# Patient Record
Sex: MALE | Race: WHITE | Employment: UNEMPLOYED | URBAN - METROPOLITAN AREA
[De-identification: names, ages, dates, MRNs, and addresses within clinical notes are randomized per-mention and may not be internally consistent; named-entity substitution may affect disease eponyms.]

---

## 2019-07-15 ENCOUNTER — APPOINTMENT (OUTPATIENT)
Dept: CT IMAGING | Age: 59
DRG: 957 | End: 2019-07-15
Payer: COMMERCIAL

## 2019-07-15 ENCOUNTER — APPOINTMENT (OUTPATIENT)
Dept: GENERAL RADIOLOGY | Age: 59
DRG: 957 | End: 2019-07-15
Payer: COMMERCIAL

## 2019-07-15 ENCOUNTER — HOSPITAL ENCOUNTER (INPATIENT)
Age: 59
LOS: 31 days | Discharge: NURSING, REHAB OR LONG-TERM CARE | DRG: 957 | End: 2019-08-15
Attending: EMERGENCY MEDICINE | Admitting: SURGERY
Payer: COMMERCIAL

## 2019-07-15 DIAGNOSIS — S22.41XA CLOSED FRACTURE OF MULTIPLE RIBS OF RIGHT SIDE, INITIAL ENCOUNTER: ICD-10-CM
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
## 2019-07-15 PROBLEM — E87.6 HYPOKALEMIA: Status: ACTIVE | Noted: 2019-07-15

## 2019-07-15 PROBLEM — S32.301A CLOSED FRACTURE OF RIGHT ILIAC WING (HCC): Status: ACTIVE | Noted: 2019-07-15

## 2019-07-15 PROBLEM — S27.321A CONTUSION OF RIGHT LUNG: Status: ACTIVE | Noted: 2019-07-15

## 2019-07-15 PROBLEM — J94.2 HEMOTHORAX ON RIGHT: Status: ACTIVE | Noted: 2019-07-15

## 2019-07-15 PROBLEM — J93.9 PNEUMOTHORAX: Status: ACTIVE | Noted: 2019-07-15

## 2019-07-15 PROBLEM — S22.009A FRACTURE OF TRANSVERSE PROCESS OF THORACIC VERTEBRA (HCC): Status: ACTIVE | Noted: 2019-07-15

## 2019-07-15 LAB
ABO/RH: NORMAL
ABSOLUTE EOS #: 0.24 K/UL (ref 0–0.44)
ABSOLUTE IMMATURE GRANULOCYTE: 0.12 K/UL (ref 0–0.3)
ABSOLUTE LYMPH #: 0.61 K/UL (ref 1.1–3.7)
ABSOLUTE MONO #: 1.09 K/UL (ref 0.1–1.2)
ALLEN TEST: ABNORMAL
ANION GAP SERPL CALCULATED.3IONS-SCNC: 19 MMOL/L (ref 9–17)
ANION GAP SERPL CALCULATED.3IONS-SCNC: 22 MMOL/L (ref 9–17)
ANTIBODY SCREEN: NEGATIVE
ARM BAND NUMBER: NORMAL
BASOPHILS # BLD: 0 % (ref 0–2)
BASOPHILS ABSOLUTE: 0 K/UL (ref 0–0.2)
BLOOD BANK SPECIMEN: ABNORMAL
BUN BLDV-MCNC: 10 MG/DL (ref 6–20)
BUN BLDV-MCNC: 12 MG/DL (ref 6–20)
BUN/CREAT BLD: ABNORMAL (ref 9–20)
CALCIUM SERPL-MCNC: 8.2 MG/DL (ref 8.6–10.4)
CARBOXYHEMOGLOBIN: 0.9 % (ref 0–5)
CHLORIDE BLD-SCNC: 87 MMOL/L (ref 98–107)
CHLORIDE BLD-SCNC: 91 MMOL/L (ref 98–107)
CO2: 19 MMOL/L (ref 20–31)
CO2: 24 MMOL/L (ref 20–31)
CREAT SERPL-MCNC: 0.82 MG/DL (ref 0.7–1.2)
CREAT SERPL-MCNC: 1.06 MG/DL (ref 0.7–1.2)
DIFFERENTIAL TYPE: ABNORMAL
EOSINOPHILS RELATIVE PERCENT: 2 % (ref 1–4)
ETHANOL PERCENT: 0.29 %
ETHANOL: 295 MG/DL
EXPIRATION DATE: NORMAL
FIO2: ABNORMAL
GFR AFRICAN AMERICAN: >60 ML/MIN
GFR AFRICAN AMERICAN: >60 ML/MIN
GFR NON-AFRICAN AMERICAN: >60 ML/MIN
GFR NON-AFRICAN AMERICAN: >60 ML/MIN
GFR SERPL CREATININE-BSD FRML MDRD: ABNORMAL ML/MIN/{1.73_M2}
GLUCOSE BLD-MCNC: 119 MG/DL (ref 70–99)
GLUCOSE BLD-MCNC: 120 MG/DL (ref 70–99)
HCG QUALITATIVE: ABNORMAL
HCO3 VENOUS: 25.9 MMOL/L (ref 24–30)
HCT VFR BLD CALC: 34.6 % (ref 40.7–50.3)
HCT VFR BLD CALC: 39.5 % (ref 40.7–50.3)
HEMOGLOBIN: 11.4 G/DL (ref 13–17)
HEMOGLOBIN: 13.6 G/DL (ref 13–17)
IMMATURE GRANULOCYTES: 1 %
INR BLD: 1
LYMPHOCYTES # BLD: 5 % (ref 24–43)
MAGNESIUM: 1.5 MG/DL (ref 1.6–2.6)
MCH RBC QN AUTO: 31 PG (ref 25.2–33.5)
MCH RBC QN AUTO: 32.2 PG (ref 25.2–33.5)
MCHC RBC AUTO-ENTMCNC: 32.9 G/DL (ref 28.4–34.8)
MCHC RBC AUTO-ENTMCNC: 34.4 G/DL (ref 28.4–34.8)
MCV RBC AUTO: 93.6 FL (ref 82.6–102.9)
MCV RBC AUTO: 94 FL (ref 82.6–102.9)
METHEMOGLOBIN: ABNORMAL % (ref 0–1.5)
MODE: ABNORMAL
MONOCYTES # BLD: 9 % (ref 3–12)
MORPHOLOGY: NORMAL
NEGATIVE BASE EXCESS, VEN: 0.2 MMOL/L (ref 0–2)
NOTIFICATION TIME: ABNORMAL
NOTIFICATION: ABNORMAL
NRBC AUTOMATED: 0 PER 100 WBC
NRBC AUTOMATED: 0 PER 100 WBC
O2 DEVICE/FLOW/%: ABNORMAL
O2 SAT, VEN: 35.1 % (ref 60–85)
OXYHEMOGLOBIN: ABNORMAL % (ref 95–98)
PARTIAL THROMBOPLASTIN TIME: 23.2 SEC (ref 20.5–30.5)
PATIENT TEMP: 37
PCO2, VEN, TEMP ADJ: ABNORMAL MMHG (ref 39–55)
PCO2, VEN: 50.5 (ref 39–55)
PDW BLD-RTO: 11.9 % (ref 11.8–14.4)
PDW BLD-RTO: 11.9 % (ref 11.8–14.4)
PEEP/CPAP: ABNORMAL
PH VENOUS: 7.33 (ref 7.32–7.42)
PH, VEN, TEMP ADJ: ABNORMAL (ref 7.32–7.42)
PLATELET # BLD: 184 K/UL (ref 138–453)
PLATELET # BLD: 235 K/UL (ref 138–453)
PLATELET ESTIMATE: ABNORMAL
PMV BLD AUTO: 8.8 FL (ref 8.1–13.5)
PMV BLD AUTO: 8.9 FL (ref 8.1–13.5)
PO2, VEN, TEMP ADJ: ABNORMAL MMHG (ref 30–50)
PO2, VEN: 27.3 (ref 30–50)
POSITIVE BASE EXCESS, VEN: ABNORMAL MMOL/L (ref 0–2)
POTASSIUM SERPL-SCNC: 2.6 MMOL/L (ref 3.7–5.3)
POTASSIUM SERPL-SCNC: 2.9 MMOL/L (ref 3.7–5.3)
POTASSIUM SERPL-SCNC: 3 MMOL/L (ref 3.7–5.3)
PROTHROMBIN TIME: 10.3 SEC (ref 9–12)
PSV: ABNORMAL
PT. POSITION: ABNORMAL
RBC # BLD: 3.68 M/UL (ref 4.21–5.77)
RBC # BLD: 4.22 M/UL (ref 4.21–5.77)
RBC # BLD: ABNORMAL 10*6/UL
RESPIRATORY RATE: ABNORMAL
SAMPLE SITE: ABNORMAL
SEG NEUTROPHILS: 83 % (ref 36–65)
SEGMENTED NEUTROPHILS ABSOLUTE COUNT: 10.04 K/UL (ref 1.5–8.1)
SET RATE: ABNORMAL
SODIUM BLD-SCNC: 130 MMOL/L (ref 135–144)
SODIUM BLD-SCNC: 132 MMOL/L (ref 135–144)
TEXT FOR RESPIRATORY: ABNORMAL
TOTAL HB: ABNORMAL G/DL (ref 12–16)
TOTAL RATE: ABNORMAL
VITAMIN D 25-HYDROXY: 40.9 NG/ML (ref 30–100)
VT: ABNORMAL
WBC # BLD: 12.1 K/UL (ref 3.5–11.3)
WBC # BLD: 14.5 K/UL (ref 3.5–11.3)
WBC # BLD: ABNORMAL 10*3/UL

## 2019-07-15 PROCEDURE — 92523 SPEECH SOUND LANG COMPREHEN: CPT

## 2019-07-15 PROCEDURE — 94760 N-INVAS EAR/PLS OXIMETRY 1: CPT

## 2019-07-15 PROCEDURE — 6370000000 HC RX 637 (ALT 250 FOR IP): Performed by: STUDENT IN AN ORGANIZED HEALTH CARE EDUCATION/TRAINING PROGRAM

## 2019-07-15 PROCEDURE — 94669 MECHANICAL CHEST WALL OSCILL: CPT

## 2019-07-15 PROCEDURE — 82565 ASSAY OF CREATININE: CPT

## 2019-07-15 PROCEDURE — 71045 X-RAY EXAM CHEST 1 VIEW: CPT

## 2019-07-15 PROCEDURE — 2500000003 HC RX 250 WO HCPCS: Performed by: STUDENT IN AN ORGANIZED HEALTH CARE EDUCATION/TRAINING PROGRAM

## 2019-07-15 PROCEDURE — 85610 PROTHROMBIN TIME: CPT

## 2019-07-15 PROCEDURE — 6360000002 HC RX W HCPCS: Performed by: STUDENT IN AN ORGANIZED HEALTH CARE EDUCATION/TRAINING PROGRAM

## 2019-07-15 PROCEDURE — 2580000003 HC RX 258: Performed by: STUDENT IN AN ORGANIZED HEALTH CARE EDUCATION/TRAINING PROGRAM

## 2019-07-15 PROCEDURE — 82805 BLOOD GASES W/O2 SATURATION: CPT

## 2019-07-15 PROCEDURE — 86850 RBC ANTIBODY SCREEN: CPT

## 2019-07-15 PROCEDURE — 70450 CT HEAD/BRAIN W/O DYE: CPT

## 2019-07-15 PROCEDURE — 74177 CT ABD & PELVIS W/CONTRAST: CPT

## 2019-07-15 PROCEDURE — 6360000004 HC RX CONTRAST MEDICATION: Performed by: SURGERY

## 2019-07-15 PROCEDURE — 86901 BLOOD TYPING SEROLOGIC RH(D): CPT

## 2019-07-15 PROCEDURE — 84132 ASSAY OF SERUM POTASSIUM: CPT

## 2019-07-15 PROCEDURE — 6360000002 HC RX W HCPCS

## 2019-07-15 PROCEDURE — 2060000000 HC ICU INTERMEDIATE R&B

## 2019-07-15 PROCEDURE — 85027 COMPLETE CBC AUTOMATED: CPT

## 2019-07-15 PROCEDURE — 2700000000 HC OXYGEN THERAPY PER DAY

## 2019-07-15 PROCEDURE — G0480 DRUG TEST DEF 1-7 CLASSES: HCPCS

## 2019-07-15 PROCEDURE — 84520 ASSAY OF UREA NITROGEN: CPT

## 2019-07-15 PROCEDURE — 80048 BASIC METABOLIC PNL TOTAL CA: CPT

## 2019-07-15 PROCEDURE — 72170 X-RAY EXAM OF PELVIS: CPT

## 2019-07-15 PROCEDURE — 99285 EMERGENCY DEPT VISIT HI MDM: CPT

## 2019-07-15 PROCEDURE — 82947 ASSAY GLUCOSE BLOOD QUANT: CPT

## 2019-07-15 PROCEDURE — 72128 CT CHEST SPINE W/O DYE: CPT

## 2019-07-15 PROCEDURE — 72125 CT NECK SPINE W/O DYE: CPT

## 2019-07-15 PROCEDURE — 36415 COLL VENOUS BLD VENIPUNCTURE: CPT

## 2019-07-15 PROCEDURE — 85025 COMPLETE CBC W/AUTO DIFF WBC: CPT

## 2019-07-15 PROCEDURE — 86900 BLOOD TYPING SEROLOGIC ABO: CPT

## 2019-07-15 PROCEDURE — 84703 CHORIONIC GONADOTROPIN ASSAY: CPT

## 2019-07-15 PROCEDURE — 72131 CT LUMBAR SPINE W/O DYE: CPT

## 2019-07-15 PROCEDURE — 83735 ASSAY OF MAGNESIUM: CPT

## 2019-07-15 PROCEDURE — 82306 VITAMIN D 25 HYDROXY: CPT

## 2019-07-15 PROCEDURE — 80051 ELECTROLYTE PANEL: CPT

## 2019-07-15 PROCEDURE — 85730 THROMBOPLASTIN TIME PARTIAL: CPT

## 2019-07-15 RX ORDER — ACETAMINOPHEN 325 MG/1
650 TABLET ORAL EVERY 4 HOURS PRN
Status: CANCELLED | OUTPATIENT
Start: 2019-07-15

## 2019-07-15 RX ORDER — PROMETHAZINE HYDROCHLORIDE 25 MG/ML
6.25 INJECTION, SOLUTION INTRAMUSCULAR; INTRAVENOUS ONCE
Status: COMPLETED | OUTPATIENT
Start: 2019-07-15 | End: 2019-07-15

## 2019-07-15 RX ORDER — LIDOCAINE 4 G/G
2 PATCH TOPICAL DAILY
Status: DISCONTINUED | OUTPATIENT
Start: 2019-07-15 | End: 2019-08-15 | Stop reason: HOSPADM

## 2019-07-15 RX ORDER — GABAPENTIN 300 MG/1
300 CAPSULE ORAL EVERY 8 HOURS
Status: DISCONTINUED | OUTPATIENT
Start: 2019-07-15 | End: 2019-07-22

## 2019-07-15 RX ORDER — MAGNESIUM SULFATE 1 G/100ML
1 INJECTION INTRAVENOUS ONCE
Status: COMPLETED | OUTPATIENT
Start: 2019-07-15 | End: 2019-07-15

## 2019-07-15 RX ORDER — POTASSIUM CHLORIDE 7.45 MG/ML
20 INJECTION INTRAVENOUS ONCE
Status: COMPLETED | OUTPATIENT
Start: 2019-07-15 | End: 2019-07-15

## 2019-07-15 RX ORDER — FENTANYL CITRATE 50 UG/ML
25 INJECTION, SOLUTION INTRAMUSCULAR; INTRAVENOUS ONCE
Status: COMPLETED | OUTPATIENT
Start: 2019-07-15 | End: 2019-07-15

## 2019-07-15 RX ORDER — POLYETHYLENE GLYCOL 3350 17 G/17G
17 POWDER, FOR SOLUTION ORAL DAILY
Status: DISCONTINUED | OUTPATIENT
Start: 2019-07-15 | End: 2019-08-03

## 2019-07-15 RX ORDER — OXYCODONE HYDROCHLORIDE 5 MG/1
5 TABLET ORAL EVERY 4 HOURS PRN
Status: DISCONTINUED | OUTPATIENT
Start: 2019-07-15 | End: 2019-07-21

## 2019-07-15 RX ORDER — ACETAMINOPHEN 500 MG
1000 TABLET ORAL EVERY 8 HOURS
Status: DISCONTINUED | OUTPATIENT
Start: 2019-07-15 | End: 2019-08-15 | Stop reason: HOSPADM

## 2019-07-15 RX ORDER — POTASSIUM CHLORIDE 7.45 MG/ML
10 INJECTION INTRAVENOUS
Status: COMPLETED | OUTPATIENT
Start: 2019-07-15 | End: 2019-07-15

## 2019-07-15 RX ORDER — CYCLOBENZAPRINE HCL 10 MG
5 TABLET ORAL EVERY 8 HOURS
Status: DISCONTINUED | OUTPATIENT
Start: 2019-07-15 | End: 2019-07-15

## 2019-07-15 RX ORDER — SODIUM CHLORIDE 0.9 % (FLUSH) 0.9 %
10 SYRINGE (ML) INJECTION PRN
Status: DISCONTINUED | OUTPATIENT
Start: 2019-07-15 | End: 2019-08-15 | Stop reason: HOSPADM

## 2019-07-15 RX ORDER — SODIUM CHLORIDE 0.9 % (FLUSH) 0.9 %
10 SYRINGE (ML) INJECTION EVERY 12 HOURS SCHEDULED
Status: DISCONTINUED | OUTPATIENT
Start: 2019-07-15 | End: 2019-08-15 | Stop reason: HOSPADM

## 2019-07-15 RX ORDER — IBUPROFEN 400 MG/1
400 TABLET ORAL ONCE
Status: DISCONTINUED | OUTPATIENT
Start: 2019-07-15 | End: 2019-07-15

## 2019-07-15 RX ORDER — FAMOTIDINE 20 MG/1
20 TABLET, FILM COATED ORAL 2 TIMES DAILY
Status: DISCONTINUED | OUTPATIENT
Start: 2019-07-15 | End: 2019-07-26

## 2019-07-15 RX ORDER — POTASSIUM CHLORIDE 20 MEQ/1
40 TABLET, EXTENDED RELEASE ORAL ONCE
Status: COMPLETED | OUTPATIENT
Start: 2019-07-15 | End: 2019-07-15

## 2019-07-15 RX ORDER — ONDANSETRON 2 MG/ML
4 INJECTION INTRAMUSCULAR; INTRAVENOUS EVERY 6 HOURS PRN
Status: DISCONTINUED | OUTPATIENT
Start: 2019-07-15 | End: 2019-07-22

## 2019-07-15 RX ORDER — POTASSIUM CHLORIDE 20 MEQ/1
10 TABLET, EXTENDED RELEASE ORAL 2 TIMES DAILY
Status: DISCONTINUED | OUTPATIENT
Start: 2019-07-15 | End: 2019-07-20

## 2019-07-15 RX ORDER — ACETAMINOPHEN 500 MG
1000 TABLET ORAL ONCE
Status: DISCONTINUED | OUTPATIENT
Start: 2019-07-15 | End: 2019-07-15

## 2019-07-15 RX ORDER — DOCUSATE SODIUM 100 MG/1
100 CAPSULE, LIQUID FILLED ORAL DAILY
Status: DISCONTINUED | OUTPATIENT
Start: 2019-07-15 | End: 2019-07-25

## 2019-07-15 RX ORDER — CYCLOBENZAPRINE HCL 10 MG
5 TABLET ORAL EVERY 8 HOURS
Status: DISCONTINUED | OUTPATIENT
Start: 2019-07-15 | End: 2019-07-30

## 2019-07-15 RX ORDER — GABAPENTIN 300 MG/1
300 CAPSULE ORAL EVERY 8 HOURS
Status: DISCONTINUED | OUTPATIENT
Start: 2019-07-15 | End: 2019-07-15

## 2019-07-15 RX ORDER — IBUPROFEN 400 MG/1
400 TABLET ORAL EVERY 4 HOURS
Status: DISCONTINUED | OUTPATIENT
Start: 2019-07-15 | End: 2019-07-21

## 2019-07-15 RX ORDER — OXYCODONE HYDROCHLORIDE 5 MG/1
10 TABLET ORAL EVERY 4 HOURS PRN
Status: DISCONTINUED | OUTPATIENT
Start: 2019-07-15 | End: 2019-07-21

## 2019-07-15 RX ORDER — SODIUM CHLORIDE, SODIUM LACTATE, POTASSIUM CHLORIDE, CALCIUM CHLORIDE 600; 310; 30; 20 MG/100ML; MG/100ML; MG/100ML; MG/100ML
INJECTION, SOLUTION INTRAVENOUS CONTINUOUS
Status: DISCONTINUED | OUTPATIENT
Start: 2019-07-15 | End: 2019-07-15

## 2019-07-15 RX ADMIN — POTASSIUM CHLORIDE 40 MEQ: 20 TABLET, EXTENDED RELEASE ORAL at 19:38

## 2019-07-15 RX ADMIN — Medication 10 ML: at 19:38

## 2019-07-15 RX ADMIN — ACETAMINOPHEN 1000 MG: 500 TABLET ORAL at 11:48

## 2019-07-15 RX ADMIN — POTASSIUM CHLORIDE 40 MEQ: 20 TABLET, EXTENDED RELEASE ORAL at 12:52

## 2019-07-15 RX ADMIN — IBUPROFEN 400 MG: 400 TABLET, FILM COATED ORAL at 19:38

## 2019-07-15 RX ADMIN — GABAPENTIN 300 MG: 300 CAPSULE ORAL at 16:49

## 2019-07-15 RX ADMIN — IBUPROFEN 400 MG: 400 TABLET, FILM COATED ORAL at 22:56

## 2019-07-15 RX ADMIN — POTASSIUM CHLORIDE 10 MEQ: 7.46 INJECTION, SOLUTION INTRAVENOUS at 21:59

## 2019-07-15 RX ADMIN — ONDANSETRON 4 MG: 2 INJECTION INTRAMUSCULAR; INTRAVENOUS at 03:52

## 2019-07-15 RX ADMIN — IBUPROFEN 400 MG: 400 TABLET, FILM COATED ORAL at 09:35

## 2019-07-15 RX ADMIN — PROMETHAZINE HYDROCHLORIDE 6.25 MG: 25 INJECTION INTRAMUSCULAR; INTRAVENOUS at 05:49

## 2019-07-15 RX ADMIN — DOCUSATE SODIUM 100 MG: 100 CAPSULE, LIQUID FILLED ORAL at 09:35

## 2019-07-15 RX ADMIN — SODIUM CHLORIDE, POTASSIUM CHLORIDE, SODIUM LACTATE AND CALCIUM CHLORIDE: 600; 310; 30; 20 INJECTION, SOLUTION INTRAVENOUS at 03:58

## 2019-07-15 RX ADMIN — FOLIC ACID: 5 INJECTION, SOLUTION INTRAMUSCULAR; INTRAVENOUS; SUBCUTANEOUS at 20:31

## 2019-07-15 RX ADMIN — Medication 10 ML: at 09:40

## 2019-07-15 RX ADMIN — MAGNESIUM SULFATE IN DEXTROSE 1 G: 10 INJECTION, SOLUTION INTRAVENOUS at 22:52

## 2019-07-15 RX ADMIN — ACETAMINOPHEN 1000 MG: 500 TABLET ORAL at 19:35

## 2019-07-15 RX ADMIN — IOHEXOL 130 ML: 350 INJECTION, SOLUTION INTRAVENOUS at 02:03

## 2019-07-15 RX ADMIN — IBUPROFEN 400 MG: 400 TABLET, FILM COATED ORAL at 11:48

## 2019-07-15 RX ADMIN — IBUPROFEN 400 MG: 400 TABLET, FILM COATED ORAL at 15:08

## 2019-07-15 RX ADMIN — POTASSIUM CHLORIDE 10 MEQ: 7.46 INJECTION, SOLUTION INTRAVENOUS at 19:39

## 2019-07-15 RX ADMIN — CYCLOBENZAPRINE 5 MG: 10 TABLET, FILM COATED ORAL at 16:49

## 2019-07-15 RX ADMIN — POTASSIUM CHLORIDE 10 MEQ: 1500 TABLET, EXTENDED RELEASE ORAL at 09:35

## 2019-07-15 RX ADMIN — POTASSIUM CHLORIDE 10 MEQ: 1500 TABLET, EXTENDED RELEASE ORAL at 06:06

## 2019-07-15 RX ADMIN — CYCLOBENZAPRINE 5 MG: 10 TABLET, FILM COATED ORAL at 09:35

## 2019-07-15 RX ADMIN — IBUPROFEN 400 MG: 400 TABLET, FILM COATED ORAL at 05:37

## 2019-07-15 RX ADMIN — OXYCODONE HYDROCHLORIDE 10 MG: 5 TABLET ORAL at 20:50

## 2019-07-15 RX ADMIN — POTASSIUM CHLORIDE 10 MEQ: 1500 TABLET, EXTENDED RELEASE ORAL at 19:43

## 2019-07-15 RX ADMIN — OXYCODONE HYDROCHLORIDE 10 MG: 5 TABLET ORAL at 11:47

## 2019-07-15 RX ADMIN — POTASSIUM CHLORIDE 20 MEQ: 7.46 INJECTION, SOLUTION INTRAVENOUS at 04:21

## 2019-07-15 RX ADMIN — OXYCODONE HYDROCHLORIDE 10 MG: 5 TABLET ORAL at 06:08

## 2019-07-15 RX ADMIN — POTASSIUM CHLORIDE 10 MEQ: 7.46 INJECTION, SOLUTION INTRAVENOUS at 20:50

## 2019-07-15 RX ADMIN — ACETAMINOPHEN 1000 MG: 500 TABLET ORAL at 05:37

## 2019-07-15 RX ADMIN — FENTANYL CITRATE 25 MCG: 50 INJECTION, SOLUTION INTRAMUSCULAR; INTRAVENOUS at 04:21

## 2019-07-15 RX ADMIN — POTASSIUM CHLORIDE 10 MEQ: 7.46 INJECTION, SOLUTION INTRAVENOUS at 22:53

## 2019-07-15 RX ADMIN — OXYCODONE HYDROCHLORIDE 10 MG: 5 TABLET ORAL at 16:38

## 2019-07-15 RX ADMIN — GABAPENTIN 300 MG: 300 CAPSULE ORAL at 09:35

## 2019-07-15 RX ADMIN — POLYETHYLENE GLYCOL 3350 17 G: 17 POWDER, FOR SOLUTION ORAL at 09:40

## 2019-07-15 ASSESSMENT — PAIN DESCRIPTION - PAIN TYPE: TYPE: ACUTE PAIN

## 2019-07-15 ASSESSMENT — PAIN SCALES - GENERAL
PAINLEVEL_OUTOF10: 9
PAINLEVEL_OUTOF10: 10
PAINLEVEL_OUTOF10: 9
PAINLEVEL_OUTOF10: 10
PAINLEVEL_OUTOF10: 10
PAINLEVEL_OUTOF10: 9

## 2019-07-15 ASSESSMENT — PAIN DESCRIPTION - LOCATION: LOCATION: RIB CAGE;BACK

## 2019-07-15 ASSESSMENT — PAIN DESCRIPTION - ORIENTATION: ORIENTATION: RIGHT

## 2019-07-15 NOTE — CARE COORDINATION
SBIRT completed - see below    Pt stated he drinks alcohol daily, 6 beers  Admits to marijuana use 2-3x week  He denied other drug use  Pt stated he is not concerned about his alcohol/drug use  No prior tx - stated he has only done a  72 hrs DUI program  Prior involvement with AA that was court ordered  Pt does not plan to quit drinking  Offered to get him a list of tx resources in his area - pt declined            Alcohol Screening and Brief Intervention        Recent Labs     07/15/19  0154   *       Alcohol Pre-screening  (MEN ONLY) How many times in the past year have you had 5 or more drinks in a day?: 1 or more       Alcohol Screening Audit  TOTAL SCORE[de-identified] 14    Drug Pre-Screening   How many times in the past year have you used a recreational drug or used a prescription medication for nonmedical reasons?: 1 or more    Drug Screening DAST  TOTAL SCORE[de-identified] 1    Mood Pre-Screening (PHQ-2)  During the past two weeks, have you been bothered by little interest or pleasure in doing things?: No  During the past two weeks, have you been bothered by feeling down, depressed, or hopeless?: No    Mood Pre-Screening (PHQ-9)         I have interviewed Jordyn Morales, [id number] regarding  His alcohol consumption/drug use and risk for excessive use. Screenings were positive. Patient  Declined intervention at this time.      Deferred []    Completed on: 7/15/2019   Wood Huang, MSW, LSW

## 2019-07-15 NOTE — PROGRESS NOTES
Occupational Therapy Not Seen Note    DATE: 7/15/2019  Name: Ignacia Cuba  : 1960  MRN: [id number]    Patient not available for Occupational Therapy due to:    Strict Bedrest    Next Scheduled Treatment: check back 19    Electronically signed by ASHER Lujan on 7/15/2019 at 8:39 AM

## 2019-07-15 NOTE — CONSULTS
Department of Neurosurgery                                       Resident Consult Note      Reason for Consult:  Thoracic TP fxrs with persistent back pain  Requesting Physician:  Trauma  Neurosurgeon:   [x]Dr. Baldo Skiff    History Obtained From:  patient    CHIEF COMPLAINT:         Fall from 8-10 ft with midline back pain    HISTORY OF PRESENT ILLNESS:       The patient is a 63 y.o. male who presents as a transfer via [facility] from [place] after a fall from 8-10 feet onto a concrete patio. He ws intoxicated at the time and did have positive LOC. Pt found to have multiple injuries on review of CT imaging, including multiple thoracic spine TP fxrs. No intracranial or cervical spine injuries. Pt was able to have his C-spine cleared, but is still complaining of persistent mid-back pain, so he has been kept in TLS precautions. No numbness, tingling, weakness. Not on blood thinners. No headache or nausea or emesis. No dizziness. Otherwise, he is complaining of persistent chest wall/rib pain and right hip pain. PAST MEDICAL HISTORY :       Past Medical History:    No past medical history on file. Past Surgical History:    No past surgical history on file.     Social History:   Social History     Socioeconomic History    Marital status:      Spouse name: Not on file    Number of children: Not on file    Years of education: Not on file    Highest education level: Not on file   Occupational History    Not on file   Social Needs    Financial resource strain: Not on file    Food insecurity:     Worry: Not on file     Inability: Not on file    Transportation needs:     Medical: Not on file     Non-medical: Not on file   Tobacco Use    Smoking status: Not on file   Substance and Sexual Activity    Alcohol use: Not on file    Drug use: Not on file    Sexual activity: Not on file   Lifestyle    Physical activity:     Days per week: Not on file     Minutes per session: Not on file    CONSTITUTIONAL: negative for fatigue and malaise   EYES: negative for double vision and photophobia    HEENT: negative for tinnitus and sore throat   RESPIRATORY: negative for cough, shortness of breath   CARDIOVASCULAR: negative for chest pain, palpitations   GASTROINTESTINAL: negative for nausea, vomiting   GENITOURINARY: negative for incontinence   MUSCULOSKELETAL: negative for neck or back pain   NEUROLOGICAL: negative for seizures   PSYCHIATRIC: negative for agitated     Review of systems otherwise negative. PHYSICAL EXAM:       BP (!) 160/85   Pulse 90   Temp 98.8 °F (37.1 °C) (Oral)   Resp 17   Ht 6' 1\" (1.854 m)   Wt 194 lb 0.1 oz (88 kg)   SpO2 96%   BMI 25.60 kg/m²      CONSTITUTIONAL:  Well developed, well nourished, alert and oriented x 3, in no acute distress. GCS 15, nontoxic. No dysarthria, no aphasia. EOMI.     HEAD:  normocephalic, atraumatic    EYES:  PERRLA, EOMI.   ENT:  moist mucous membranes   NECK:  supple, symmetric, no midline tenderness to palpation    BACK:  midline tenderness in the thoracic and upper lumbar region, step-offs or deformities    LUNGS:  Equal air entry bilaterally, right lateral chest pain   CARDIOVASCULAR:  normal s1 / s2   ABDOMEN:  Soft, no rigidity   NEUROLOGIC:  EYE OPENING     Spontaneous - 4 [x]       To voice - 3 []       To pain - 2 []       None - 1 []    VERBAL RESPONSE     Appropriate, oriented - 5 [x]       Dazed or confused - 4 []       Syllables, expletives - 3 []       Grunts - 2 []       None - 1 []    MOTOR RESPONSE     Spontaneous, command - 6 [x]       Localizes pain - 5 []       Withdraws pain - 4 []       Abnormal flexion - 3 []       Abnormal extension - 2 []       None - 1 []            Total GCS: 15    Mental Status:  A & O x3, awake             Cranial Nerves:    cranial nerves II-XII are grossly intact    Motor Exam:    Drift:  absent  Tone:  normal    Motor exam is symmetrical 5 out of 5 all extremities bilaterally    Sensory: adjustment of the mA/kV was utilized to reduce the radiation dose to as low as reasonably achievable. COMPARISON: None. HISTORY: ORDERING SYSTEM PROVIDED HISTORY: fall TECHNOLOGIST PROVIDED HISTORY: Reason for Exam: TRAUMA FALL Acuity: Acute Type of Exam: Initial; ORDERING SYSTEM PROVIDED HISTORY: fall TECHNOLOGIST PROVIDED HISTORY: Reason for Exam: TRAUMA FALL Acuity: Acute Type of Exam: Initial FINDINGS: CT HEAD: BRAIN/VENTRICLES: There is no acute intracranial hemorrhage, mass effect or midline shift. No abnormal extra-axial fluid collection. The gray-white differentiation is maintained without evidence of an acute infarct. There is no evidence of hydrocephalus. ORBITS: The visualized portion of the orbits demonstrate no acute abnormality. SINUSES: Left maxillary sinus mucous retention cyst.  The other visualized paranasal sinuses and mastoid air cells demonstrate no acute abnormality. SOFT TISSUES/SKULL:  No acute abnormality of the visualized skull or soft tissues. CT CERVICAL SPINE: No acute fracture. Straightening of the cervical lordosis. 3 mm retrolisthesis of C5 on C6. The craniocervical junction is intact. Partial osseous fusion of C2-C3. No destructive osseous lesion. Multilevel mild-to-moderate degenerative disc and facet joint disease. Multilevel mild-to-moderate cervical spinal canal stenosis secondary to disc osteophyte complexes. Multilevel neural foraminal narrowing secondary to uncovertebral and facet joint disease. No acute focal soft tissue abnormality. Acute nondisplaced fracture of the posterior right 1st rib. No acute intracranial intracranial hemorrhage or mass effect. No acute cervical spine fracture. Multilevel mild to moderate cervical spondylosis. Acute nondisplaced fracture of the posterior right 1st rib.      Ct Thoracic Spine Wo Contrast    Result Date: 7/15/2019  EXAMINATION: CT OF THE CHEST, ABDOMEN, AND PELVIS WITH CONTRAST; CT OF THE LUMBAR SPINE WITHOUT CONTRAST; CT OF THE THORACIC SPINE WITHOUT CONTRAST 7/15/2019 1:45 am TECHNIQUE: CT of the chest, abdomen and pelvis was performed with the administration of intravenous contrast. Multiplanar reformatted images are provided for review. Dose modulation, iterative reconstruction, and/or weight based adjustment of the mA/kV was utilized to reduce the radiation dose to as low as reasonably achievable.; CT of the lumbar spine was performed without the administration of intravenous contrast. Multiplanar reformatted images are provided for review. Dose modulation, iterative reconstruction, and/or weight based adjustment of the mA/kV was utilized to reduce the radiation dose to as low as reasonably achievable.; CT of the thoracic spine was performed without the administration of intravenous contrast. Multiplanar reformatted images are provided for review. Dose modulation, iterative reconstruction, and/or weight based adjustment of the mA/kV was utilized to reduce the radiation dose to as low as reasonably achievable. COMPARISON: None HISTORY: ORDERING SYSTEM PROVIDED HISTORY: fall TECHNOLOGIST PROVIDED HISTORY: ; ORDERING SYSTEM PROVIDED HISTORY: fall TECHNOLOGIST PROVIDED HISTORY: fall; ORDERING SYSTEM PROVIDED HISTORY: fall FINDINGS: Chest: Mediastinum: Normal heart and pericardium. Minimal atherosclerosis of the thoracic aorta without evidence of acute injury. No periaortic hemorrhage. Normal caliber main pulmonary artery. Small calcified bilateral hilar/perihilar lymph nodes suggestive of remote granulomatous disease. Small thyroid gland may be atrophic. No intrathoracic lymphadenopathy. No pneumomediastinum. Normal esophagus. Lungs/pleura: Respiratory motion. Patchy and ground-glass opacities with consolidation in the right lung, most pronounced in the right lower lobe and posterior right upper lobe. Findings are most suggestive of pulmonary contusion. Small right posterior hemothorax. Trace right pneumothorax.  Mild acute right-sided rib fractures with surrounding small amount of chest wall hemorrhage and gas (grade 2). Small right-sided hemothorax. Trace right pneumothorax. 2.   Patchy and heterogeneous opacities with consolidation throughout the right lung, most pronounced in the right lower lobe and posterior right upper lobe. Findings likely represent a combination of atelectasis and pulmonary contusion (grade 3). 3.  Acute nondisplaced fractures of the right T2, T3, T4, T6 and T9 right transverse processes. 4.  Acute, comminuted and nondisplaced fracture of the right iliac wing. 5.  No solid abdominal organ injury. 6.  Mild multilevel degenerative disease in the thoracic and lumbar spine. Ct Lumbar Spine Wo Contrast    Result Date: 7/15/2019  EXAMINATION: CT OF THE CHEST, ABDOMEN, AND PELVIS WITH CONTRAST; CT OF THE LUMBAR SPINE WITHOUT CONTRAST; CT OF THE THORACIC SPINE WITHOUT CONTRAST 7/15/2019 1:45 am TECHNIQUE: CT of the chest, abdomen and pelvis was performed with the administration of intravenous contrast. Multiplanar reformatted images are provided for review. Dose modulation, iterative reconstruction, and/or weight based adjustment of the mA/kV was utilized to reduce the radiation dose to as low as reasonably achievable.; CT of the lumbar spine was performed without the administration of intravenous contrast. Multiplanar reformatted images are provided for review. Dose modulation, iterative reconstruction, and/or weight based adjustment of the mA/kV was utilized to reduce the radiation dose to as low as reasonably achievable.; CT of the thoracic spine was performed without the administration of intravenous contrast. Multiplanar reformatted images are provided for review. Dose modulation, iterative reconstruction, and/or weight based adjustment of the mA/kV was utilized to reduce the radiation dose to as low as reasonably achievable.  COMPARISON: None HISTORY: ORDERING SYSTEM PROVIDED HISTORY: fall the abdomen or pelvis. THORACIC SPINE: Acute nondisplaced fractures of the right T2, T3, T4, T6, and T9 right transverse processes. The thoracic vertebral body heights are normal.  No destructive osseous lesion. Normal thoracic kyphosis. No significant listhesis. Mild multilevel degenerative disc disease. Small multilevel marginal osteophytes and endplate Schmorl's node deformities. No definite severe thoracic spinal canal stenosis. LUMBAR SPINE: No acute fracture in the lumbar spine. Mild grade 1 anterolisthesis of L4 on L5 secondary to chronic bilateral L5 pars interarticularis defects. No destructive osseous lesion. Mild multilevel degenerative disc and facet joint disease. No definite severe lumbar spinal canal stenosis. Mild-to-moderate bilateral L4-L5 neural foraminal narrowing. Bilateral sacroiliac joint degeneration. 1.  Multiple acute right-sided rib fractures with surrounding small amount of chest wall hemorrhage and gas (grade 2). Small right-sided hemothorax. Trace right pneumothorax. 2.   Patchy and heterogeneous opacities with consolidation throughout the right lung, most pronounced in the right lower lobe and posterior right upper lobe. Findings likely represent a combination of atelectasis and pulmonary contusion (grade 3). 3.  Acute nondisplaced fractures of the right T2, T3, T4, T6 and T9 right transverse processes. 4.  Acute, comminuted and nondisplaced fracture of the right iliac wing. 5.  No solid abdominal organ injury. 6.  Mild multilevel degenerative disease in the thoracic and lumbar spine. Xr Chest Portable    Result Date: 7/15/2019  EXAMINATION: ONE XRAY VIEW OF THE CHEST 7/15/2019 10:45 am COMPARISON: 7/15/2019 HISTORY: ORDERING SYSTEM PROVIDED HISTORY: fall, rib fx TECHNOLOGIST PROVIDED HISTORY: fall, rib fx Reason for Exam: supine FINDINGS: Stable cardiomediastinal contours. No pneumothorax.   Stable diffuse hazy opacities within the right chest.  Diffuse airspace organ injury. 6.  Mild multilevel degenerative disease in the thoracic and lumbar spine. ASSESSMENT AND PLAN:       Patient Active Problem List   Diagnosis    Fall    Closed fracture of multiple ribs of right side    Contusion of right lung    Fracture of transverse process of thoracic vertebra (HCC)    Hemothorax on right    Pneumothorax    Closed fracture of right iliac wing (HCC)    Hypokalemia         A/P:  This is a 63 y.o. male transfer from [place] after a fall from 8-10 ft onto a concrete patio. He sustained multiple injuries, including T 2-4, 6, and 9 TP fxrs and is reporting persistent midline back pain. Patient care will be discussed with attending, will reevaluate patient along with attending     - No neurosurgical interventions planned for now  - CTLS recommendations: CTLS spine can be cleared. - HOB: 30 degrees   - TLSO brace may be ordered for comfort to aid in ambulation and mobility   - Neuro checks per protocol  - Hold all antiplatelets and anticoagulants  - PO pain control  - All other care per the primary team    Additional recommendations may follow    Please contact neurosurgery with any changes in patients neurologic status. Thank you for your consult.        Moreno White MD   NS pager [phone]  7/15/2019  5:28 PM

## 2019-07-15 NOTE — H&P
trochanter    GENERAL DATA  Age 63 y.o.  male   Patient information was obtained from EMS personnel. History/Exam limitations: intoxication.   Patient presented to the Emergency Department by ambulance where the patient received IV, cervical collar and back boarded prior to arrival.  Injury Date: 7/15/2019   Approximate Injury Time: 1200        Transport mode:   []Ambulance      [x] Helicopter     []Car       [] Other  Referring Hospital:    [facility] [address], (e.g., home, farm, industry, street)  Specific Details of Location (e.g., bedroom, kitchen, garage[place]  Type of Residence (if occurred in home setting) (e.g., apartment, mobile home, single family home): unknown    MECHANISM OF INJURY    [] Motor Vehicle Collision   Specific vehicle type involved (e.g., sedan, minivan, SUV, pickup truck):   Collision with (e.g., type of vehicle, building, barn, ditch, tree):     Type of collision  [] Single Vehicle Collision  []Multiple Vehicle Collision  [] unknown collision type    Mechanism considerations  [] Fatality in Same Vehicle      []Ejected       []Rollover          []Extricated    Internal Compartment   []                      []Passenger:      []Front Seat        []Rear Seat     Personal Restraints  [] Unrestrained   []Lap Belt Only Restrained   [] Shoulder Belt Only Restrained  [] 3 Point Restrained  [] unknown     Air Bags  [] Front Air Bag  []Side Air Bag  []Curtain Airbag []Pianpian Not Deployed        Pediatric Consideration:      [] Booster Seat  []Infant Car Seat  [] Child Car Seat      [] Motorcycle Collision   Wearing Helmet     []Yes     []No    []Unknown    [] Bicycle Collision Wearing Helmet     []Yes     []No    []Unknown    [] Pedestrian Struck         [x] Fall    []From Standing     []From Height 6-10 Ft     []Down Stairs ___steps    [] Assault    [] Gunshot  Specify caliber / type of gun: ____________________________    [] Stabbing  Specify weapon type, size: retention cyst.  The other visualized paranasal sinuses and mastoid air cells demonstrate no acute abnormality. SOFT TISSUES/SKULL:  No acute abnormality of the visualized skull or soft tissues. CT CERVICAL SPINE: No acute fracture. Straightening of the cervical lordosis. 3 mm retrolisthesis of C5 on C6. The craniocervical junction is intact. Partial osseous fusion of C2-C3. No destructive osseous lesion. Multilevel mild-to-moderate degenerative disc and facet joint disease. Multilevel mild-to-moderate cervical spinal canal stenosis secondary to disc osteophyte complexes. Multilevel neural foraminal narrowing secondary to uncovertebral and facet joint disease. No acute focal soft tissue abnormality. Acute nondisplaced fracture of the posterior right 1st rib. No acute intracranial intracranial hemorrhage or mass effect. No acute cervical spine fracture. Multilevel mild to moderate cervical spondylosis. Acute nondisplaced fracture of the posterior right 1st rib. Ct Thoracic Spine Wo Contrast    Result Date: 7/15/2019  EXAMINATION: CT OF THE CHEST, ABDOMEN, AND PELVIS WITH CONTRAST; CT OF THE LUMBAR SPINE WITHOUT CONTRAST; CT OF THE THORACIC SPINE WITHOUT CONTRAST 7/15/2019 1:45 am TECHNIQUE: CT of the chest, abdomen and pelvis was performed with the administration of intravenous contrast. Multiplanar reformatted images are provided for review. Dose modulation, iterative reconstruction, and/or weight based adjustment of the mA/kV was utilized to reduce the radiation dose to as low as reasonably achievable.; CT of the lumbar spine was performed without the administration of intravenous contrast. Multiplanar reformatted images are provided for review.  Dose modulation, iterative reconstruction, and/or weight based adjustment of the mA/kV was utilized to reduce the radiation dose to as low as reasonably achievable.; CT of the thoracic spine was performed without the administration of intravenous foraminal narrowing. Bilateral sacroiliac joint degeneration. 1.  Multiple acute right-sided rib fractures with surrounding small amount of chest wall hemorrhage and gas (grade 2). Small right-sided hemothorax. Trace right pneumothorax. 2.   Patchy and heterogeneous opacities with consolidation throughout the right lung, most pronounced in the right lower lobe and posterior right upper lobe. Findings likely represent a combination of atelectasis and pulmonary contusion (grade 3). 3.  Acute nondisplaced fractures of the right T2, T3, T4, T6 and T9 right transverse processes. 4.  Acute, comminuted and nondisplaced fracture of the right iliac wing. 5.  No solid abdominal organ injury. 6.  Mild multilevel degenerative disease in the thoracic and lumbar spine. Xr Chest Portable    Result Date: 7/15/2019  EXAMINATION: ONE XRAY VIEW OF THE CHEST 7/15/2019 1:52 am COMPARISON: None. HISTORY: ORDERING SYSTEM PROVIDED HISTORY: fall TECHNOLOGIST PROVIDED HISTORY: fall Reason for Exam: fall Acuity: Acute FINDINGS: Frontal portable view of the chest.  Heterogeneous opacities throughout the right lung may relate to a combination of atelectasis and pulmonary contusion in the setting of trauma. Multiple right-sided rib fractures. Asymmetric right apical pleural thickening may relate to hemothorax. No large pneumothorax. The cardiomediastinal silhouette and great vessels are within normal limits. Heterogeneous opacities throughout the right lung may relate to a combination of atelectasis and pulmonary contusion in the setting of trauma. Multiple right-sided rib fractures. Asymmetric right apical pleural thickening may relate to hemothorax. No large pneumothorax.      Ct Chest Abdomen Pelvis W Contrast    Result Date: 7/15/2019  EXAMINATION: CT OF THE CHEST, ABDOMEN, AND PELVIS WITH CONTRAST; CT OF THE LUMBAR SPINE WITHOUT CONTRAST; CT OF THE THORACIC SPINE WITHOUT CONTRAST 7/15/2019 1:45 am TECHNIQUE: CT of the effusion or pneumothorax. No endoluminal lesion. Soft Tissues/Bones: Acute nondisplaced fracture of the posterior right 1st rib. Acute multi part fractures of the right 3rd through 12th ribs, some of which are displaced. Old rib fracture deformities of the left posterior 9th and 10th ribs. Small amount of air in the right posterolateral chest wall. Abdomen/Pelvis: Liver: Normal. Gallbladder and Bile Ducts: Normal. Spleen: Normal. Adrenal Glands: Normal. Pancreas: Normal. Genitourinary: Normal. Bowel: Normal. Vasculature: Atherosclerosis. No abdominal aortic aneurysm. No periaortic hemorrhage. Patent main portal vein. Bones and Soft Tissues: Acute, comminuted and nondisplaced fracture of the right iliac wing. No definite extension into the right acetabulum or right sacroiliac joint. Right flank subcutaneous soft tissue stranding and induration consistent with contusion. Retroperitoneum/Mesentery: No intraperitoneal free air, ascites or fluid collection. No lymphadenopathy in the abdomen or pelvis. THORACIC SPINE: Acute nondisplaced fractures of the right T2, T3, T4, T6, and T9 right transverse processes. The thoracic vertebral body heights are normal.  No destructive osseous lesion. Normal thoracic kyphosis. No significant listhesis. Mild multilevel degenerative disc disease. Small multilevel marginal osteophytes and endplate Schmorl's node deformities. No definite severe thoracic spinal canal stenosis. LUMBAR SPINE: No acute fracture in the lumbar spine. Mild grade 1 anterolisthesis of L4 on L5 secondary to chronic bilateral L5 pars interarticularis defects. No destructive osseous lesion. Mild multilevel degenerative disc and facet joint disease. No definite severe lumbar spinal canal stenosis. Mild-to-moderate bilateral L4-L5 neural foraminal narrowing. Bilateral sacroiliac joint degeneration.      1.  Multiple acute right-sided rib fractures with surrounding small amount of chest wall hemorrhage

## 2019-07-15 NOTE — ED PROVIDER NOTES
service: Not on file     Active member of club or organization: Not on file     Attends meetings of clubs or organizations: Not on file     Relationship status: Not on file    Intimate partner violence:     Fear of current or ex partner: Not on file     Emotionally abused: Not on file     Physically abused: Not on file     Forced sexual activity: Not on file   Other Topics Concern    Not on file   Social History Narrative    Not on file       No family history on file. Allergies:  Patient has no known allergies. Home Medications:  Prior to Admission medications    Not on File       REVIEW OF SYSTEMS    (2-9 systems for level 4, 10 or more forlevel 5)      Review of Systems   Reason unable to perform ROS: Intoxicated, trauma, see trauma documentation. PHYSICAL EXAM   (up to 7 for level 4, 8 or more forlevel 5)      ED TRIAGE VITALS BP: 93/60, Temp: 97.6 °F (36.4 °C), Pulse: 76, Resp: 14, SpO2: 97 %    Vitals:    07/15/19 0315 07/15/19 0335   BP: 93/60    Pulse: 75 76   Resp: 14    Temp: 97.6 °F (36.4 °C)    TempSrc: Oral    SpO2: 97%    Weight: 194 lb 0.1 oz (88 kg)    Height: 6' 1\" (1.854 m)          Physical Exam   Constitutional: He is oriented to person, place, and time. He appears well-developed and well-nourished. No distress. HENT:   Head: Normocephalic and atraumatic. Right Ear: External ear normal.   Left Ear: External ear normal.   Nose: Nose normal.   Mouth/Throat: Oropharynx is clear and moist. No oropharyngeal exudate. Small abrasions on the medial eyebrows bilaterally   Eyes: Pupils are equal, round, and reactive to light. Conjunctivae and EOM are normal. Right eye exhibits no discharge. Left eye exhibits no discharge. No scleral icterus. Neck: Normal range of motion. No tracheal deviation present. Cardiovascular: Normal rate, regular rhythm, normal heart sounds and intact distal pulses. Exam reveals no gallop and no friction rub. No murmur heard.   Pulmonary/Chest: Effort normal and breath sounds normal. No stridor. No respiratory distress. He has no wheezes. He has no rales. Abdominal: Soft. Bowel sounds are normal. He exhibits no distension and no mass. There is no tenderness. Musculoskeletal: Normal range of motion. Neurological: He is alert and oriented to person, place, and time. He exhibits normal muscle tone. Coordination normal.   Intoxicated, GCS 15   Skin: Skin is warm and dry. No rash noted. He is not diaphoretic. DIFFERENTIAL  DIAGNOSIS     PLAN (LABS / IMAGING / EKG):  Orders Placed This Encounter   Procedures    CT HEAD WO CONTRAST    CT CERVICAL SPINE WO CONTRAST    CT THORACIC SPINE WO CONTRAST    CT LUMBAR SPINE WO CONTRAST    CT CHEST ABDOMEN PELVIS W CONTRAST    XR CHEST PORTABLE    XR PELVIS (MIN 3 VIEWS)    XR PELVIS (1-2 VIEWS)    XR CHEST PORTABLE    Trauma Panel    Basic Metabolic Panel w/ Reflex to MG    CBC    Vitamin D 25 Hydroxy    Diet NPO Effective Now Exceptions are: Sips with Meds    Vital signs per unit routine    Notify patient's primary care physician of admission    Place intermittent pneumatic compression device    Notify physician    Strict Bedrest    Intake and output    Nursing communication    Incentive spirometry nursing    Telemetry monitoring    Elevate heels off of bed at all times if patient is not able to move lower extremities    Turn or assist with turn every 2 hours if patient is unable to turn self. Remind patient to turn if necessary.     Inspect skin per unit guidelines    Maintain HOB at the lowest elevation consistent with medical plan of care    Full Code    Inpatient consult to Orthopedic Surgery    OT eval and treat    PT evaluation and treat    Initiate Oxygen Therapy Protocol    Acapella    Type and Screen    PATIENT STATUS (FROM ED OR OR/PROCEDURAL) Inpatient       MEDICATIONS ORDERED:  ED Medication Orders (From admission, onward)    Start Ordered     Status Ordering

## 2019-07-15 NOTE — CONSULTS
Orthopedic Surgery Consult  (Dr. Natalie Duarte)      CC/Reason for consult:  R iliac wing fracture    HPI:      The patient is a 63 y.o. male who presents to [facility] via life flight from [address] as trauma priority after fall from height. Pt was reported drinking alcohol all day and fell around 6-10ft. Pt denies hitting head, per chart review and nursing he did have +LOC. GCS 15 on arrival. Pt currently reports pain to right side chest and middle back. Pt denies pain elsewhere. Pt denies numbness, tingling. Past Medical History:    No past medical history on file. Past Surgical History:    No past surgical history on file. Medications Prior to Admission:   Prior to Admission medications    Not on File       Allergies:    Patient has no allergy information on record.     Social History:   Social History     Socioeconomic History    Marital status:      Spouse name: Not on file    Number of children: Not on file    Years of education: Not on file    Highest education level: Not on file   Occupational History    Not on file   Social Needs    Financial resource strain: Not on file    Food insecurity:     Worry: Not on file     Inability: Not on file    Transportation needs:     Medical: Not on file     Non-medical: Not on file   Tobacco Use    Smoking status: Not on file   Substance and Sexual Activity    Alcohol use: Not on file    Drug use: Not on file    Sexual activity: Not on file   Lifestyle    Physical activity:     Days per week: Not on file     Minutes per session: Not on file    Stress: Not on file   Relationships    Social connections:     Talks on phone: Not on file     Gets together: Not on file     Attends [employer] service: Not on file     Active member of club or organization: Not on file     Attends meetings of clubs or organizations: Not on file     Relationship status: Not on file    Intimate partner violence:     Fear of current or ex partner: Not on file     Emotionally passive ROM met without pain. Compartments soft and compressible. Hand is warm and perfused. Axillary/MSC/Ulnar/Median/AIN/PIN motor intact. C4-T1 SILT. Radial pulse 2+ with BCR    RLE: Minimal pain over iliac crest. No obvious bruising, swelling. No other ecchymoses, abrasions, deformity, erythema or lacerations. Skin intact. No TTP or crepitus to hip, femur, knee, tibia/fibula, ankle or foot. Full active and passive ROM met without pain. Negative log roll. Negative Stinchfield. Compartments soft and compressible. Foot is warm and perfused. EHL/FHL/TA/GS complex motor intact. Sural, saphenous, superficial/deep peroneal, and plantar nerve distribution SILT. Dorsalis pedis/posterior tibial pulses 2+ with BCR. Knee ligaments grossly intact. LLE: No ecchymoses, abrasions, deformity, erythema or lacerations. Skin intact. No TTP or crepitus to hip, femur, knee, tibia/fibula, ankle or foot. Full active and passive ROM met without pain. Negative log roll. Negative Stinchfield. Compartments soft and compressible. Foot is warm and perfused. EHL/FHL/TA/GS complex motor intact. Sural, saphenous, superficial/deep peroneal, and plantar nerve distribution SILT. Dorsalis pedis/posterior tibial pulses 2+ with BCR. Knee ligaments grossly intact.       LABS:  Recent Labs     07/15/19  0154   WBC 14.5*   HGB 13.6   HCT 39.5*      INR 1.0   *   K 2.6*   BUN 12   CREATININE 1.06   GLUCOSE 119*        Radiology:   -CT chest/abd/pelvis demonstrates minimally displaced right iliac wing fracture    A/P: 63 y.o. male being seen for fall from height with the following injuries:    -Right minimally displaced iliac wing fracture  -Right multiple rib fractures  -Right multiple TP fractures  -Right PTX/RENÉ      -No acute surgical intervention  -Weight bearing: WBAT RLE  -Trauma primary  -Pain control per primary  -Discussed with patient need for strict ice for pain/swelling  -F/u pelvic XRs  -DVT ppx: EPC, ok for chem AC  -Please

## 2019-07-15 NOTE — PROGRESS NOTES
6.  Mild multilevel degenerative disease in the thoracic and lumbar spine. CT HEAD WO CONTRAST   Final Result   No acute intracranial intracranial hemorrhage or mass effect. No acute cervical spine fracture. Multilevel mild to moderate cervical   spondylosis. Acute nondisplaced fracture of the posterior right 1st rib. CT CERVICAL SPINE WO CONTRAST   Final Result   No acute intracranial intracranial hemorrhage or mass effect. No acute cervical spine fracture. Multilevel mild to moderate cervical   spondylosis. Acute nondisplaced fracture of the posterior right 1st rib. XR CHEST PORTABLE   Final Result   Heterogeneous opacities throughout the right lung may relate to a combination   of atelectasis and pulmonary contusion in the setting of trauma. Multiple right-sided rib fractures. Asymmetric right apical pleural   thickening may relate to hemothorax. No large pneumothorax. XR PELVIS (1-2 VIEWS)    (Results Pending)   XR CHEST PORTABLE    (Results Pending)        PHYSICAL EXAM:   GCS:  4 - Opens eyes on own   6 - Follows simple motor commands  5 - Alert and oriented    Pupil size:  Left 3 mm Right 3 mm  Pupil reaction: Yes  Wiggles fingers: Left Yes Right Yes  Hand grasp:   Left normal   Right normal  Wiggles toes: Left Yes    Right Yes  Plantar flexion: Left normal  Right normal    Physical Exam   Constitutional: He is oriented to person, place, and time. He appears well-developed. No distress. HENT:   Head: Normocephalic and atraumatic. Nose: Nose normal.   Eyes: Pupils are equal, round, and reactive to light. Conjunctivae and EOM are normal.   Neck: Normal range of motion. Neck supple. No tracheal deviation present. Cardiovascular: Normal rate, regular rhythm, normal heart sounds and intact distal pulses. No murmur heard. Pulmonary/Chest: Effort normal and breath sounds normal. No respiratory distress. He has no wheezes. Abdominal: Soft. Bowel sounds are normal. He exhibits no distension. There is no tenderness. There is no guarding. Musculoskeletal: Normal range of motion. He exhibits no edema, tenderness or deformity. Neurological: He is alert and oriented to person, place, and time. No sensory deficit. Skin: Skin is warm and dry. Capillary refill takes less than 2 seconds. Psychiatric: His behavior is normal.         Spine:     Spine Tenderness ROM   Cervical 0 /10 Normal   Thoracic 6 /10 Abnormal, secondary to pain   Lumbar 6 /10 Abnormal, secondary to pain     Musculoskeletal    Joint Tenderness Swelling ROM   Right shoulder absent absent normal   Left shoulder absent absent normal   Right elbow absent absent normal   Left elbow absent absent normal   Right wrist absent absent normal   Left wrist absent absent normal   Right hand grasp absent absent normal   Left hand grasp absent absent normal   Right hip absent absent normal   Left hip absent absent normal   Right knee absent absent normal   Left knee absent absent normal   Right ankle absent absent normal   Left ankle absent absent normal   Right foot absent absent normal   Left foot absent absent normal           CONSULTS: Orthopedic surgery    PROCEDURES: None    INJURIES: Small right hemo-/pneumothorax, first rib fracture on the right, right ribs 3 through 12 multipart fractures, transverse process fractures on the right T2-4, 6 and 9. Right iliac wing fracture. Pulmonary contusion. Active Problems:    Fall    Closed fracture of multiple ribs of right side    Contusion of right lung    Fracture of transverse process of thoracic vertebra (HCC)    Hemothorax on right    Pneumothorax    Closed fracture of right iliac wing (HCC)    Hypokalemia  Resolved Problems:    * No resolved hospital problems.  *         Assessment/Plan:     Neuro:   Multiple thoracic transverse process fractures   No neuro deficits   Still experiencing significant thoracic and lumbar back

## 2019-07-16 ENCOUNTER — APPOINTMENT (OUTPATIENT)
Dept: GENERAL RADIOLOGY | Age: 59
DRG: 957 | End: 2019-07-16
Payer: COMMERCIAL

## 2019-07-16 LAB
ANION GAP SERPL CALCULATED.3IONS-SCNC: 10 MMOL/L (ref 9–17)
BUN BLDV-MCNC: 9 MG/DL (ref 6–20)
BUN/CREAT BLD: ABNORMAL (ref 9–20)
CALCIUM SERPL-MCNC: 8.4 MG/DL (ref 8.6–10.4)
CHLORIDE BLD-SCNC: 93 MMOL/L (ref 98–107)
CO2: 29 MMOL/L (ref 20–31)
CREAT SERPL-MCNC: 0.9 MG/DL (ref 0.7–1.2)
GFR AFRICAN AMERICAN: >60 ML/MIN
GFR NON-AFRICAN AMERICAN: >60 ML/MIN
GFR SERPL CREATININE-BSD FRML MDRD: ABNORMAL ML/MIN/{1.73_M2}
GFR SERPL CREATININE-BSD FRML MDRD: ABNORMAL ML/MIN/{1.73_M2}
GLUCOSE BLD-MCNC: 120 MG/DL (ref 70–99)
HCT VFR BLD CALC: 31.8 % (ref 40.7–50.3)
HEMOGLOBIN: 10.6 G/DL (ref 13–17)
MCH RBC QN AUTO: 31.9 PG (ref 25.2–33.5)
MCHC RBC AUTO-ENTMCNC: 33.3 G/DL (ref 28.4–34.8)
MCV RBC AUTO: 95.8 FL (ref 82.6–102.9)
NRBC AUTOMATED: 0 PER 100 WBC
PDW BLD-RTO: 12 % (ref 11.8–14.4)
PLATELET # BLD: 142 K/UL (ref 138–453)
PMV BLD AUTO: 9.3 FL (ref 8.1–13.5)
POTASSIUM SERPL-SCNC: 4.1 MMOL/L (ref 3.7–5.3)
RBC # BLD: 3.32 M/UL (ref 4.21–5.77)
SODIUM BLD-SCNC: 132 MMOL/L (ref 135–144)
WBC # BLD: 10.6 K/UL (ref 3.5–11.3)

## 2019-07-16 PROCEDURE — 2060000000 HC ICU INTERMEDIATE R&B

## 2019-07-16 PROCEDURE — 99221 1ST HOSP IP/OBS SF/LOW 40: CPT | Performed by: NEUROLOGICAL SURGERY

## 2019-07-16 PROCEDURE — 94760 N-INVAS EAR/PLS OXIMETRY 1: CPT

## 2019-07-16 PROCEDURE — 6370000000 HC RX 637 (ALT 250 FOR IP): Performed by: STUDENT IN AN ORGANIZED HEALTH CARE EDUCATION/TRAINING PROGRAM

## 2019-07-16 PROCEDURE — 2500000003 HC RX 250 WO HCPCS: Performed by: STUDENT IN AN ORGANIZED HEALTH CARE EDUCATION/TRAINING PROGRAM

## 2019-07-16 PROCEDURE — 97530 THERAPEUTIC ACTIVITIES: CPT

## 2019-07-16 PROCEDURE — 2580000003 HC RX 258: Performed by: STUDENT IN AN ORGANIZED HEALTH CARE EDUCATION/TRAINING PROGRAM

## 2019-07-16 PROCEDURE — 85027 COMPLETE CBC AUTOMATED: CPT

## 2019-07-16 PROCEDURE — 36415 COLL VENOUS BLD VENIPUNCTURE: CPT

## 2019-07-16 PROCEDURE — 73000 X-RAY EXAM OF COLLAR BONE: CPT

## 2019-07-16 PROCEDURE — 6360000002 HC RX W HCPCS: Performed by: STUDENT IN AN ORGANIZED HEALTH CARE EDUCATION/TRAINING PROGRAM

## 2019-07-16 PROCEDURE — 80048 BASIC METABOLIC PNL TOTAL CA: CPT

## 2019-07-16 PROCEDURE — 97535 SELF CARE MNGMENT TRAINING: CPT

## 2019-07-16 PROCEDURE — 97166 OT EVAL MOD COMPLEX 45 MIN: CPT

## 2019-07-16 PROCEDURE — 71045 X-RAY EXAM CHEST 1 VIEW: CPT

## 2019-07-16 PROCEDURE — 97162 PT EVAL MOD COMPLEX 30 MIN: CPT

## 2019-07-16 RX ORDER — IBUPROFEN 400 MG/1
400 TABLET ORAL EVERY 4 HOURS
Qty: 120 TABLET | Refills: 3 | Status: SHIPPED | OUTPATIENT
Start: 2019-07-16 | End: 2019-08-15

## 2019-07-16 RX ORDER — CYCLOBENZAPRINE HCL 5 MG
5 TABLET ORAL EVERY 8 HOURS
Qty: 21 TABLET | Refills: 0 | Status: SHIPPED | OUTPATIENT
Start: 2019-07-16 | End: 2019-08-15 | Stop reason: HOSPADM

## 2019-07-16 RX ORDER — PSEUDOEPHEDRINE HCL 30 MG
100 TABLET ORAL 2 TIMES DAILY PRN
Qty: 14 CAPSULE | Refills: 0 | Status: SHIPPED | OUTPATIENT
Start: 2019-07-16 | End: 2019-07-23

## 2019-07-16 RX ORDER — ACETAMINOPHEN 500 MG
1000 TABLET ORAL EVERY 6 HOURS PRN
Qty: 28 TABLET | Refills: 0 | Status: SHIPPED | OUTPATIENT
Start: 2019-07-16 | End: 2019-09-03

## 2019-07-16 RX ORDER — GABAPENTIN 300 MG/1
300 CAPSULE ORAL 3 TIMES DAILY
Qty: 21 CAPSULE | Refills: 0 | Status: SHIPPED | OUTPATIENT
Start: 2019-07-16 | End: 2019-08-15 | Stop reason: HOSPADM

## 2019-07-16 RX ORDER — OXYCODONE HYDROCHLORIDE 5 MG/1
5 TABLET ORAL EVERY 8 HOURS PRN
Qty: 21 TABLET | Refills: 0 | Status: SHIPPED | OUTPATIENT
Start: 2019-07-16 | End: 2019-08-15 | Stop reason: HOSPADM

## 2019-07-16 RX ADMIN — GABAPENTIN 300 MG: 300 CAPSULE ORAL at 08:26

## 2019-07-16 RX ADMIN — Medication 10 ML: at 21:25

## 2019-07-16 RX ADMIN — OXYCODONE HYDROCHLORIDE 10 MG: 5 TABLET ORAL at 23:45

## 2019-07-16 RX ADMIN — ACETAMINOPHEN 1000 MG: 500 TABLET ORAL at 03:16

## 2019-07-16 RX ADMIN — GABAPENTIN 300 MG: 300 CAPSULE ORAL at 17:30

## 2019-07-16 RX ADMIN — ACETAMINOPHEN 1000 MG: 500 TABLET ORAL at 21:22

## 2019-07-16 RX ADMIN — CYCLOBENZAPRINE 5 MG: 10 TABLET, FILM COATED ORAL at 17:30

## 2019-07-16 RX ADMIN — CYCLOBENZAPRINE 5 MG: 10 TABLET, FILM COATED ORAL at 01:01

## 2019-07-16 RX ADMIN — OXYCODONE HYDROCHLORIDE 10 MG: 5 TABLET ORAL at 14:59

## 2019-07-16 RX ADMIN — IBUPROFEN 400 MG: 400 TABLET, FILM COATED ORAL at 11:19

## 2019-07-16 RX ADMIN — FOLIC ACID: 5 INJECTION, SOLUTION INTRAMUSCULAR; INTRAVENOUS; SUBCUTANEOUS at 08:24

## 2019-07-16 RX ADMIN — IBUPROFEN 400 MG: 400 TABLET, FILM COATED ORAL at 03:16

## 2019-07-16 RX ADMIN — IBUPROFEN 400 MG: 400 TABLET, FILM COATED ORAL at 21:32

## 2019-07-16 RX ADMIN — GABAPENTIN 300 MG: 300 CAPSULE ORAL at 01:01

## 2019-07-16 RX ADMIN — CYCLOBENZAPRINE 5 MG: 10 TABLET, FILM COATED ORAL at 08:26

## 2019-07-16 RX ADMIN — OXYCODONE HYDROCHLORIDE 10 MG: 5 TABLET ORAL at 01:03

## 2019-07-16 RX ADMIN — POTASSIUM CHLORIDE 10 MEQ: 1500 TABLET, EXTENDED RELEASE ORAL at 08:27

## 2019-07-16 RX ADMIN — Medication 10 ML: at 08:27

## 2019-07-16 RX ADMIN — OXYCODONE HYDROCHLORIDE 10 MG: 5 TABLET ORAL at 10:20

## 2019-07-16 RX ADMIN — IBUPROFEN 400 MG: 400 TABLET, FILM COATED ORAL at 15:38

## 2019-07-16 RX ADMIN — POTASSIUM CHLORIDE 10 MEQ: 1500 TABLET, EXTENDED RELEASE ORAL at 21:25

## 2019-07-16 RX ADMIN — OXYCODONE HYDROCHLORIDE 10 MG: 5 TABLET ORAL at 19:08

## 2019-07-16 RX ADMIN — IBUPROFEN 400 MG: 400 TABLET, FILM COATED ORAL at 08:27

## 2019-07-16 RX ADMIN — ACETAMINOPHEN 1000 MG: 500 TABLET ORAL at 11:19

## 2019-07-16 RX ADMIN — OXYCODONE HYDROCHLORIDE 10 MG: 5 TABLET ORAL at 06:44

## 2019-07-16 RX ADMIN — DOCUSATE SODIUM 100 MG: 100 CAPSULE, LIQUID FILLED ORAL at 08:26

## 2019-07-16 ASSESSMENT — PAIN SCALES - GENERAL
PAINLEVEL_OUTOF10: 8
PAINLEVEL_OUTOF10: 10
PAINLEVEL_OUTOF10: 7
PAINLEVEL_OUTOF10: 3
PAINLEVEL_OUTOF10: 10
PAINLEVEL_OUTOF10: 8
PAINLEVEL_OUTOF10: 3
PAINLEVEL_OUTOF10: 8
PAINLEVEL_OUTOF10: 8
PAINLEVEL_OUTOF10: 9
PAINLEVEL_OUTOF10: 8

## 2019-07-16 ASSESSMENT — PAIN DESCRIPTION - ORIENTATION
ORIENTATION: RIGHT
ORIENTATION: RIGHT

## 2019-07-16 ASSESSMENT — PAIN DESCRIPTION - PAIN TYPE: TYPE: ACUTE PAIN

## 2019-07-16 ASSESSMENT — PAIN DESCRIPTION - LOCATION
LOCATION: BACK;RIB CAGE
LOCATION: BACK;RIB CAGE

## 2019-07-16 NOTE — PROGRESS NOTES
Kylie Lofton was evaluated today and a DME order was entered for a wheeled walker because he requires this to successfully complete daily living tasks of personal cares, ambulating and hygiene. A wheeled walker is necessary due to the patient's unsteady gait, upper body weakness, and inability to  an ambulation device; and he can ambulate only by pushing a walker instead of a lesser assistive device such as a cane, crutch, or standard walker. The need for this equipment was discussed with the patient and he understands and is in agreement. Kylie Lofton requires a raised toilet seat due to being confined to one level of the home, and is physically incapable of utilizing regular toilet facilities. Current body weight is Weight: 188 lb 11.4 oz (85.6 kg).

## 2019-07-16 NOTE — PROGRESS NOTES
PROGRESS NOTE          PATIENT NAME: [facility] RECORD NO. [id number]  DATE: 2019  SURGEON: Dr. Grande Box: Jana Molina MD    HD: # 1    ASSESSMENT    Patient Active Problem List   Diagnosis    Fall    Closed fracture of multiple ribs of right side    Contusion of right lung    Fracture of transverse process of thoracic vertebra ([address]    Hemothorax on right    Pneumothorax    Closed fracture of right iliac wing ([address]    Hypokalemia       MEDICAL DECISION MAKING AND PLAN    · Possible DC today pending PT/OT with instructions to return with signs of fever or infection. Neuro:  ? Multi-modal: Scheduled Tyl, Ibu, Jarrod, Flexiril, Lido patch  ? PRN Codie  · Cardiac  ? Telemetry, monitor  ? BP: 154/74  ? HR: 70s  · Pulm:  ? DC'd nasal cannula O2  ? Incentive Spirometry, acapella  · Heme  ? HH: 10.6/31.8  ? Ptt: 23.2/INR: 1.0  · GI/Nutrition  ? Full diet   · /Fluids/Electrolytes  ? BUN 9, Cr .9   ? Monitor UOP  ? Na: 132  ? K+: 4.1  · ID  ? WBC: 10.6  ? afebrile  · MSK  ? CTLS clear  ? Ortho Consult: partial WBAT RLE     · Endo:  ? Monitor glucose  · Lines   ? B/l PIV  · Prophylaxis  ? Awaiting ortho recs  -new Distal left clavicular fracture noted on follow-up x-ray, patient was placed in sling as per ortho recommendations. SUBJECTIVE    Jonathan Roche is has significantly improved since yesterday. Patient is seated comfortably on room air, with complaints of pain of the right chest wall. Patient was able to get his IS above 2000 while we were in the room. Patient feels comfortable with the prospect of going home if evaluation by PT/OT goes well. Patient has yet to have a bowel movement, but has started a general diet.        OBJECTIVE  VITALS: Temp: Temp: 98.1 °F (36.7 °C)Temp  Av.5 °F (36.9 °C)  Min: 97.7 °F (36.5 °C)  Max: 99 °F (54.1 °C) BP Systolic (61RQA), ZJX:385 , Min:100 , QTP:921   Diastolic (77PKW), AOO:66, Min:62, Max:85   Pulse Pulse  Av.7 pleural effusion. No evidence of pneumothorax. Similar appearance of multiple right-sided rib fractures. Displaced left distal clavicle fracture appears unchanged. No new osseous abnormalities. New elevation of right hemidiaphragm, with a new right pleural effusion. Similar appearance of multifocal pulmonary opacities within right lung. No evidence of pneumothorax. Similar appearance of multiple right-sided rib fractures. Displaced distal left clavicle fracture appears unchanged. Xr Pelvis (1-2 Views)    Result Date: 7/15/2019  EXAMINATION: ONE XRAY VIEW OF THE PELVIS 7/15/2019 3:46 pm COMPARISON: 07/15/2019 CT and pelvic radiographs HISTORY: ORDERING SYSTEM PROVIDED HISTORY: Trauma/Fracture TECHNOLOGIST PROVIDED HISTORY: Armando Cornea Views please (Obturator and Iliac Views), thank you Trauma/Fracture FINDINGS: There is a mildly comminuted and nondisplaced fracture of the right iliac wing, better visualized on previous CT. There is no visible fracture line extension to the right acetabulum or right ilioischial line. There is mild joint space narrowing and marginal spurring in the hips, more so on the right. Degenerative changes are noted in the lower lumbar spine. Mildly comminuted and nondisplaced fractures of the right iliac wing, better visualized on previous CT. No visible fracture line extension to the right acetabulum. Xr Pelvis (1-2 Views)    Result Date: 7/15/2019  EXAMINATION: ONE XRAY VIEW OF THE PELVIS 7/15/2019 4:38 am COMPARISON: CT abdomen pelvis done earlier same day. HISTORY: ORDERING SYSTEM PROVIDED HISTORY: Trauma/Fracture TECHNOLOGIST PROVIDED HISTORY: AP, Inlet and Outlet views please, thank you. Trauma/Fracture Reason for Exam: trauma/fx. Acuity: Acute Type of Exam: Initial FINDINGS: Single frontal view of the pelvis. Acute nondisplaced fracture of the right iliac wing is better seen on CT done earlier same day. No other acute fracture is identified.   No aggressive skeletal Lungs/pleura: Respiratory motion. Patchy and ground-glass opacities with consolidation in the right lung, most pronounced in the right lower lobe and posterior right upper lobe. Findings are most suggestive of pulmonary contusion. Small right posterior hemothorax. Trace right pneumothorax. Mild dependent and basilar subsegmental atelectasis in the left lung. No left pleural effusion or pneumothorax. No endoluminal lesion. Soft Tissues/Bones: Acute nondisplaced fracture of the posterior right 1st rib. Acute multi part fractures of the right 3rd through 12th ribs, some of which are displaced. Old rib fracture deformities of the left posterior 9th and 10th ribs. Small amount of air in the right posterolateral chest wall. Abdomen/Pelvis: Liver: Normal. Gallbladder and Bile Ducts: Normal. Spleen: Normal. Adrenal Glands: Normal. Pancreas: Normal. Genitourinary: Normal. Bowel: Normal. Vasculature: Atherosclerosis. No abdominal aortic aneurysm. No periaortic hemorrhage. Patent main portal vein. Bones and Soft Tissues: Acute, comminuted and nondisplaced fracture of the right iliac wing. No definite extension into the right acetabulum or right sacroiliac joint. Right flank subcutaneous soft tissue stranding and induration consistent with contusion. Retroperitoneum/Mesentery: No intraperitoneal free air, ascites or fluid collection. No lymphadenopathy in the abdomen or pelvis. THORACIC SPINE: Acute nondisplaced fractures of the right T2, T3, T4, T6, and T9 right transverse processes. The thoracic vertebral body heights are normal.  No destructive osseous lesion. Normal thoracic kyphosis. No significant listhesis. Mild multilevel degenerative disc disease. Small multilevel marginal osteophytes and endplate Schmorl's node deformities. No definite severe thoracic spinal canal stenosis. LUMBAR SPINE: No acute fracture in the lumbar spine.   Mild grade 1 anterolisthesis of L4 on L5 secondary to chronic bilateral PROVIDED HISTORY: fall; ORDERING SYSTEM PROVIDED HISTORY: fall FINDINGS: Chest: Mediastinum: Normal heart and pericardium. Minimal atherosclerosis of the thoracic aorta without evidence of acute injury. No periaortic hemorrhage. Normal caliber main pulmonary artery. Small calcified bilateral hilar/perihilar lymph nodes suggestive of remote granulomatous disease. Small thyroid gland may be atrophic. No intrathoracic lymphadenopathy. No pneumomediastinum. Normal esophagus. Lungs/pleura: Respiratory motion. Patchy and ground-glass opacities with consolidation in the right lung, most pronounced in the right lower lobe and posterior right upper lobe. Findings are most suggestive of pulmonary contusion. Small right posterior hemothorax. Trace right pneumothorax. Mild dependent and basilar subsegmental atelectasis in the left lung. No left pleural effusion or pneumothorax. No endoluminal lesion. Soft Tissues/Bones: Acute nondisplaced fracture of the posterior right 1st rib. Acute multi part fractures of the right 3rd through 12th ribs, some of which are displaced. Old rib fracture deformities of the left posterior 9th and 10th ribs. Small amount of air in the right posterolateral chest wall. Abdomen/Pelvis: Liver: Normal. Gallbladder and Bile Ducts: Normal. Spleen: Normal. Adrenal Glands: Normal. Pancreas: Normal. Genitourinary: Normal. Bowel: Normal. Vasculature: Atherosclerosis. No abdominal aortic aneurysm. No periaortic hemorrhage. Patent main portal vein. Bones and Soft Tissues: Acute, comminuted and nondisplaced fracture of the right iliac wing. No definite extension into the right acetabulum or right sacroiliac joint. Right flank subcutaneous soft tissue stranding and induration consistent with contusion. Retroperitoneum/Mesentery: No intraperitoneal free air, ascites or fluid collection. No lymphadenopathy in the abdomen or pelvis.  THORACIC SPINE: Acute nondisplaced fractures of the right T2, PM

## 2019-07-16 NOTE — PROGRESS NOTES
Occupational Therapy   Occupational Therapy Initial Assessment  Date: 2019   Patient Name: Zahraa Arroyo  MRN: [id number]     : 1960    Date of Service: 2019    Discharge Recommendations:    Further therapy recommended at discharge. OT Equipment Recommendations  Equipment Needed: Yes  Mobility Devices: Anabel Gu; ADL Assistive Devices  Walker: Rolling  ADL Assistive Devices: Toileting - Raised Toilet Seat with arms  Assessment   Performance deficits / Impairments: Decreased functional mobility ; Decreased endurance;Decreased ADL status; Decreased high-level IADLs  Treatment Diagnosis: fall   Prognosis: Good  Decision Making: Medium Complexity  Patient Education: pt ed on POC, purpose of eval, importance of movement, spinal precautions, modified to daily tasks, and pursed lip breathing tech. good return   REQUIRES OT FOLLOW UP: Yes  Activity Tolerance  Activity Tolerance: Patient Tolerated treatment well  Safety Devices  Safety Devices in place: Yes  Type of devices: Call light within reach; Left in bed;Bed alarm in place; Patient at risk for falls  Restraints  Initially in place: No         Patient Diagnosis(es): The encounter diagnosis was Fall, initial encounter. has no past medical history on file. has no past surgical history on file.     Treatment Diagnosis: fall       Restrictions  Restrictions/Precautions  Restrictions/Precautions: Fall Risk, Weight Bearing  Required Braces or Orthoses?: No(TLSO may be ordered for comfort)  Lower Extremity Weight Bearing Restrictions  Right Lower Extremity Weight Bearing: Weight Bearing As Tolerated  Position Activity Restriction  Other position/activity restrictions: WBAT R LE, up with assist. CLTS clear, TLSO brace for comfort     Subjective   General  Chart Reviewed: No  Patient assessed for rehabilitation services?: Yes  Family / Caregiver Present: Yes(pt sons and ex-wife present for duration of session)  Diagnosis: fall   Subjective  Subjective: co-eval with

## 2019-07-16 NOTE — PROGRESS NOTES
Physical Therapy    Facility/Department: [facility] 4B STEPDOWN  Initial Assessment    NAME: Marcie Castañeda  : 1960  MRN: [id number]    Date of Service: 2019  Chief Complaint   Patient presents with   Hanh Imam Fall    Trauma     Discharge Recommendations:    No further therapy required at discharge. PT Equipment Recommendations  Equipment Needed: Yes  Mobility Devices: Mike Li: Rolling    Assessment   Body structures, Functions, Activity limitations: Decreased functional mobility ; Decreased endurance;Decreased balance;Decreased strength  Assessment: The pt moved well but became diaphoretic during ambulation and had to be returned to bed. He could benefit from  the use of a walker  Prognosis: Good  Decision Making: Medium Complexity  Patient Education: PT  POC, Goals  REQUIRES PT FOLLOW UP: Yes  Activity Tolerance  Activity Tolerance: Patient limited by pain; Patient limited by fatigue       Patient Diagnosis(es): The encounter diagnosis was Fall, initial encounter. has no past medical history on file. has no past surgical history on file. Restrictions  Restrictions/Precautions  Required Braces or Orthoses?: (TLSO brace for comfort)  Position Activity Restriction  Other position/activity restrictions:  Up with assist  Vision/Hearing  Vision: Impaired  Vision Exceptions: Wears glasses at all times  Hearing: Within functional limits     Subjective  General  Patient assessed for rehabilitation services?: Yes  Response To Previous Treatment: Not applicable  Family / Caregiver Present: No  Follows Commands: Within Functional Limits  Pain Screening  Patient Currently in Pain: Yes  Pain Assessment  Pain Level: 8  Pain Location: Back;Rib cage  Pain Orientation: Right  Vital Signs  Patient Currently in Pain: Yes       Orientation  Orientation  Overall Orientation Status: Within Normal Limits  Social/Functional History  Social/Functional History  Lives With: Family  Type of Home: House  Home Layout: Two level,

## 2019-07-17 ENCOUNTER — APPOINTMENT (OUTPATIENT)
Dept: GENERAL RADIOLOGY | Age: 59
DRG: 957 | End: 2019-07-17
Payer: COMMERCIAL

## 2019-07-17 PROBLEM — S42.032A TRAUMATIC CLOSED FRACTURE OF DISTAL CLAVICLE WITH MINIMAL DISPLACEMENT, LEFT, INITIAL ENCOUNTER: Status: ACTIVE | Noted: 2019-07-17

## 2019-07-17 PROCEDURE — 71045 X-RAY EXAM CHEST 1 VIEW: CPT

## 2019-07-17 PROCEDURE — 97110 THERAPEUTIC EXERCISES: CPT

## 2019-07-17 PROCEDURE — 6360000002 HC RX W HCPCS: Performed by: STUDENT IN AN ORGANIZED HEALTH CARE EDUCATION/TRAINING PROGRAM

## 2019-07-17 PROCEDURE — 2580000003 HC RX 258: Performed by: STUDENT IN AN ORGANIZED HEALTH CARE EDUCATION/TRAINING PROGRAM

## 2019-07-17 PROCEDURE — 6370000000 HC RX 637 (ALT 250 FOR IP): Performed by: STUDENT IN AN ORGANIZED HEALTH CARE EDUCATION/TRAINING PROGRAM

## 2019-07-17 PROCEDURE — 2500000003 HC RX 250 WO HCPCS: Performed by: STUDENT IN AN ORGANIZED HEALTH CARE EDUCATION/TRAINING PROGRAM

## 2019-07-17 PROCEDURE — 2060000000 HC ICU INTERMEDIATE R&B

## 2019-07-17 PROCEDURE — 97116 GAIT TRAINING THERAPY: CPT

## 2019-07-17 RX ADMIN — POLYETHYLENE GLYCOL 3350 17 G: 17 POWDER, FOR SOLUTION ORAL at 08:07

## 2019-07-17 RX ADMIN — GABAPENTIN 300 MG: 300 CAPSULE ORAL at 08:07

## 2019-07-17 RX ADMIN — POTASSIUM CHLORIDE 10 MEQ: 1500 TABLET, EXTENDED RELEASE ORAL at 08:07

## 2019-07-17 RX ADMIN — FOLIC ACID: 5 INJECTION, SOLUTION INTRAMUSCULAR; INTRAVENOUS; SUBCUTANEOUS at 08:06

## 2019-07-17 RX ADMIN — OXYCODONE HYDROCHLORIDE 10 MG: 5 TABLET ORAL at 12:41

## 2019-07-17 RX ADMIN — POTASSIUM CHLORIDE 10 MEQ: 1500 TABLET, EXTENDED RELEASE ORAL at 20:17

## 2019-07-17 RX ADMIN — IBUPROFEN 400 MG: 400 TABLET, FILM COATED ORAL at 08:06

## 2019-07-17 RX ADMIN — CYCLOBENZAPRINE 5 MG: 10 TABLET, FILM COATED ORAL at 01:13

## 2019-07-17 RX ADMIN — ACETAMINOPHEN 1000 MG: 500 TABLET ORAL at 20:16

## 2019-07-17 RX ADMIN — Medication 10 ML: at 20:24

## 2019-07-17 RX ADMIN — GABAPENTIN 300 MG: 300 CAPSULE ORAL at 16:47

## 2019-07-17 RX ADMIN — ACETAMINOPHEN 1000 MG: 500 TABLET ORAL at 11:34

## 2019-07-17 RX ADMIN — IBUPROFEN 400 MG: 400 TABLET, FILM COATED ORAL at 23:36

## 2019-07-17 RX ADMIN — IBUPROFEN 400 MG: 400 TABLET, FILM COATED ORAL at 16:47

## 2019-07-17 RX ADMIN — IBUPROFEN 400 MG: 400 TABLET, FILM COATED ORAL at 20:16

## 2019-07-17 RX ADMIN — IBUPROFEN 400 MG: 400 TABLET, FILM COATED ORAL at 11:34

## 2019-07-17 RX ADMIN — OXYCODONE HYDROCHLORIDE 10 MG: 5 TABLET ORAL at 16:47

## 2019-07-17 RX ADMIN — Medication 10 ML: at 08:12

## 2019-07-17 RX ADMIN — DOCUSATE SODIUM 100 MG: 100 CAPSULE, LIQUID FILLED ORAL at 08:07

## 2019-07-17 RX ADMIN — OXYCODONE HYDROCHLORIDE 10 MG: 5 TABLET ORAL at 04:23

## 2019-07-17 RX ADMIN — OXYCODONE HYDROCHLORIDE 10 MG: 5 TABLET ORAL at 08:08

## 2019-07-17 RX ADMIN — IBUPROFEN 400 MG: 400 TABLET, FILM COATED ORAL at 00:20

## 2019-07-17 RX ADMIN — CYCLOBENZAPRINE 5 MG: 10 TABLET, FILM COATED ORAL at 08:07

## 2019-07-17 RX ADMIN — CYCLOBENZAPRINE 5 MG: 10 TABLET, FILM COATED ORAL at 16:47

## 2019-07-17 RX ADMIN — GABAPENTIN 300 MG: 300 CAPSULE ORAL at 01:12

## 2019-07-17 ASSESSMENT — PAIN SCALES - GENERAL
PAINLEVEL_OUTOF10: 3
PAINLEVEL_OUTOF10: 9
PAINLEVEL_OUTOF10: 6
PAINLEVEL_OUTOF10: 9
PAINLEVEL_OUTOF10: 4
PAINLEVEL_OUTOF10: 9

## 2019-07-17 ASSESSMENT — PAIN DESCRIPTION - LOCATION: LOCATION: HIP;SHOULDER

## 2019-07-17 ASSESSMENT — PAIN DESCRIPTION - PAIN TYPE: TYPE: ACUTE PAIN

## 2019-07-17 ASSESSMENT — PAIN DESCRIPTION - ORIENTATION: ORIENTATION: RIGHT;LEFT

## 2019-07-17 NOTE — PROGRESS NOTES
distal clavicular fracture was noted on previous x-ray       Pat Huang MD  7/17/19, 4:09 PM     Trauma, Emergency and Critical Surgical Services  Attending Note      I have reviewed the above TECSS note(s) and confirmed the key elements of the medical history and physical exam. I have discussed the findings, established the care plan and recommendations with Resident, TECSS RN, bedside nurse. Patient seen and examined by me. Working with PT, able to use IS without difficulties. Wean oxygen. DC planning.     Pat Huang MD  7/17/2019  4:09 PM

## 2019-07-17 NOTE — PROGRESS NOTES
x20  Inspirometer: x10      Goals  Short term goals  Time Frame for Short term goals: 10 visits  Short term goal 1: transfers with SBA  Short term goal 2: amb 150 ft with a RW x SBA  Short term goal 3: ascend/descend 4 steps with SBA  Short term goal 4: To be indepedent with bed mobility  Patient Goals   Patient goals : Return home    Plan    Plan  Times per week: 1-2x daily  Current Treatment Recommendations: Strengthening, Transfer Training, Endurance Training, Gait Training, Functional Mobility Training, Stair training, Pain Management, Safety Education & Training  Safety Devices  Type of devices: Bed alarm in place, Patient at risk for falls, Call light within reach, Left in bed, Nurse notified     Therapy Time   Individual Concurrent Group Co-treatment   Time In 12         Time Out 1510         Minutes 200 [facility]  Treatment performed by Student PTA under the supervision of co-signing PTA who agrees with all treatment and documentation.    Elva Newell, PTA

## 2019-07-17 NOTE — PROGRESS NOTES
Patient evaluated at bedside after receiving message patient was desating into the 80's on RA. Patient placed back on NC. SATing in the upper 90's and in no acute distress. Chest XR ordered. Will follow up results.      Ari Hutchins, DO  General Surgery PGY-1

## 2019-07-18 ENCOUNTER — APPOINTMENT (OUTPATIENT)
Dept: GENERAL RADIOLOGY | Age: 59
DRG: 957 | End: 2019-07-18
Payer: COMMERCIAL

## 2019-07-18 PROCEDURE — 6370000000 HC RX 637 (ALT 250 FOR IP): Performed by: STUDENT IN AN ORGANIZED HEALTH CARE EDUCATION/TRAINING PROGRAM

## 2019-07-18 PROCEDURE — 6360000002 HC RX W HCPCS: Performed by: STUDENT IN AN ORGANIZED HEALTH CARE EDUCATION/TRAINING PROGRAM

## 2019-07-18 PROCEDURE — 71045 X-RAY EXAM CHEST 1 VIEW: CPT

## 2019-07-18 PROCEDURE — 2500000003 HC RX 250 WO HCPCS: Performed by: STUDENT IN AN ORGANIZED HEALTH CARE EDUCATION/TRAINING PROGRAM

## 2019-07-18 PROCEDURE — 2580000003 HC RX 258: Performed by: STUDENT IN AN ORGANIZED HEALTH CARE EDUCATION/TRAINING PROGRAM

## 2019-07-18 PROCEDURE — 97535 SELF CARE MNGMENT TRAINING: CPT

## 2019-07-18 PROCEDURE — 97530 THERAPEUTIC ACTIVITIES: CPT

## 2019-07-18 PROCEDURE — 2060000000 HC ICU INTERMEDIATE R&B

## 2019-07-18 PROCEDURE — 97116 GAIT TRAINING THERAPY: CPT

## 2019-07-18 PROCEDURE — 97110 THERAPEUTIC EXERCISES: CPT

## 2019-07-18 RX ADMIN — POLYETHYLENE GLYCOL 3350 17 G: 17 POWDER, FOR SOLUTION ORAL at 08:29

## 2019-07-18 RX ADMIN — GABAPENTIN 300 MG: 300 CAPSULE ORAL at 08:23

## 2019-07-18 RX ADMIN — POTASSIUM CHLORIDE 10 MEQ: 1500 TABLET, EXTENDED RELEASE ORAL at 21:31

## 2019-07-18 RX ADMIN — IBUPROFEN 400 MG: 400 TABLET, FILM COATED ORAL at 21:30

## 2019-07-18 RX ADMIN — CYCLOBENZAPRINE 5 MG: 10 TABLET, FILM COATED ORAL at 01:29

## 2019-07-18 RX ADMIN — FAMOTIDINE 20 MG: 20 TABLET, FILM COATED ORAL at 11:40

## 2019-07-18 RX ADMIN — IBUPROFEN 400 MG: 400 TABLET, FILM COATED ORAL at 11:40

## 2019-07-18 RX ADMIN — DOCUSATE SODIUM 100 MG: 100 CAPSULE, LIQUID FILLED ORAL at 08:23

## 2019-07-18 RX ADMIN — FOLIC ACID: 5 INJECTION, SOLUTION INTRAMUSCULAR; INTRAVENOUS; SUBCUTANEOUS at 08:22

## 2019-07-18 RX ADMIN — ACETAMINOPHEN 1000 MG: 500 TABLET ORAL at 21:30

## 2019-07-18 RX ADMIN — Medication 10 ML: at 11:41

## 2019-07-18 RX ADMIN — OXYCODONE HYDROCHLORIDE 5 MG: 5 TABLET ORAL at 08:21

## 2019-07-18 RX ADMIN — OXYCODONE HYDROCHLORIDE 10 MG: 5 TABLET ORAL at 21:30

## 2019-07-18 RX ADMIN — IBUPROFEN 400 MG: 400 TABLET, FILM COATED ORAL at 06:09

## 2019-07-18 RX ADMIN — OXYCODONE HYDROCHLORIDE 10 MG: 5 TABLET ORAL at 15:03

## 2019-07-18 RX ADMIN — CYCLOBENZAPRINE 5 MG: 10 TABLET, FILM COATED ORAL at 08:23

## 2019-07-18 RX ADMIN — ACETAMINOPHEN 1000 MG: 500 TABLET ORAL at 11:40

## 2019-07-18 RX ADMIN — CYCLOBENZAPRINE 5 MG: 10 TABLET, FILM COATED ORAL at 17:01

## 2019-07-18 RX ADMIN — GABAPENTIN 300 MG: 300 CAPSULE ORAL at 01:29

## 2019-07-18 RX ADMIN — FAMOTIDINE 20 MG: 20 TABLET, FILM COATED ORAL at 21:00

## 2019-07-18 RX ADMIN — GABAPENTIN 300 MG: 300 CAPSULE ORAL at 17:01

## 2019-07-18 RX ADMIN — ACETAMINOPHEN 1000 MG: 500 TABLET ORAL at 03:57

## 2019-07-18 RX ADMIN — POTASSIUM CHLORIDE 10 MEQ: 1500 TABLET, EXTENDED RELEASE ORAL at 08:28

## 2019-07-18 RX ADMIN — IBUPROFEN 400 MG: 400 TABLET, FILM COATED ORAL at 03:57

## 2019-07-18 ASSESSMENT — PAIN DESCRIPTION - LOCATION: LOCATION: HIP;SHOULDER

## 2019-07-18 ASSESSMENT — PAIN SCALES - GENERAL
PAINLEVEL_OUTOF10: 2
PAINLEVEL_OUTOF10: 6
PAINLEVEL_OUTOF10: 9
PAINLEVEL_OUTOF10: 9
PAINLEVEL_OUTOF10: 8

## 2019-07-18 ASSESSMENT — PAIN DESCRIPTION - ORIENTATION: ORIENTATION: RIGHT;LEFT

## 2019-07-18 ASSESSMENT — PAIN DESCRIPTION - PAIN TYPE: TYPE: ACUTE PAIN

## 2019-07-18 NOTE — PROGRESS NOTES
Weight Bearing  Required Braces or Orthoses?: No(TLSO may be ordered for comfort)  Lower Extremity Weight Bearing Restrictions  Right Lower Extremity Weight Bearing: Weight Bearing As Tolerated  Upper Extremity Weight Bearing Restrictions  Left Upper Extremity Weight Bearing: Non Weight Bearing- per trauma note on 7/16/19- new Distal left clavicular fracture noted on follow-up x-ray, patient was placed in sling as per ortho recommendations  Position Activity Restriction  Other position/activity restrictions: WBAT R LE, up with assist. CLTS clear, TLSO brace for comfort   Subjective   General  Chart Reviewed: Yes  Response To Previous Treatment: Patient with no complaints from previous session. Family / Caregiver Present: No  Subjective  Subjective: Pt was awake and alert in room upon writers entry. Pt agreeable to PT on this date, pain med administered prior to mobility. Pain Screening  Patient Currently in Pain: No  Vital Signs  Patient Currently in Pain: No       Orientation  Orientation  Overall Orientation Status: Within Normal Limits    Objective   Bed mobility  Supine to Sit: Moderate assistance  Comment: Pt req modA for sequencing for going from supine to sit, able to advance BLEs, required assist with upper trunk due to NWB LUE  Transfers  Sit to Stand: Minimal Assistance  Stand to sit: Minimal Assistance  Comment: Pt req Ramos for verbal cues for safety awareness prior to performing transfers.    Ambulation  Ambulation?: Yes  More Ambulation?: Yes  Ambulation 1  Surface: level tile  Device: Single point cane  Assistance: Minimal assistance  Quality of Gait: Decreased lana, step length, stance phase while standing on R side, very unsteady, wide NOLBERTO, step to gait  Gait Deviations: Slow Lana;Decreased step length;Decreased step height  Distance:  amb 10 ft with SPC x Ramos for sequencing and balance with chair follow for safety  Comments: O2 was monitored with a portable pulse-ox  Ambulation 2  Surface -

## 2019-07-18 NOTE — PROGRESS NOTES
ADL  Feeding: Independent  Grooming: Stand by assistance; Increased time to complete(stood at sink)  UE Dressing: Setup;Stand by assistance  LE Dressing: Setup;Contact guard assistance  Toileting: Stand by assistance  Additional Comments: pt demo functional transfers on this date in room, pt slightly unsteady. pt declined to use sling d/t it causing pt discomfort to wear. ed on importance of sling and NWB prec to L UE, pt verbalized understanding but still refused to put it on.     Balance  Sitting Balance: Independent(seated on EOB)  Standing Balance: Contact guard assistance  Bed mobility  Supine to Sit: Contact guard assistance(HOB elevted, cues given to not bear weight on L UE- G return)  Sit to Supine: Contact guard assistance  Scooting: Contact guard assistance  Transfers  Stand Step Transfers: Minimal assistance(hand held assistance given)  Sit to stand: Contact guard assistance  Stand to sit: Contact guard assistance  Transfer Comments: pt used cane w/ PT but no cane in room so writer provided hand held assistance to R hand for support/safety  Attendance  Participation: Active participation   Pt slightly distracted noted and needed verbal cues to stay on task- good return       Plan   Plan  Times per week: 3-4x/wk       Goals  Short term goals  Time Frame for Short term goals: pt will, by discharge  Short term goal 1: complete LB ADLs with supervision, set up and AE/modified tech  Short term goal 2: complete UB ADLS and grooming tasks with mod I and set up  Short term goal 3: increase activity tolerance to 25+ minutes in order to participate in daily tasks  Short term goal 4: dem ~10 minutes dynamic standing tolerance with LRD and supervision in order to complete functional tasks  Short term goal 5: dem good safety awareness during functional transfers/functional mobility   Short term goal 6: dem mod I during functional transfers/functional mobility with LRD       Therapy Time   Individual Concurrent Group

## 2019-07-18 NOTE — PROGRESS NOTES
PROGRESS NOTE          PATIENT NAME: Kriss [facility] RECORD NO. [id number]  DATE: 2019  SURGEON: Jose Grant   PRIMARY CARE PHYSICIAN: Jenn Castro MD    HD: # 3    ASSESSMENT    Patient Active Problem List   Diagnosis    Fall    Closed fracture of multiple ribs of right side    Contusion of right lung    Fracture of transverse process of thoracic vertebra ([address]    Hemothorax on right    Pneumothorax    Closed fracture of right iliac wing (HCC)    Hypokalemia    Fx dorsal vertebra-closed ([address]    Traumatic closed fracture of distal clavicle with minimal displacement, left, initial encounter        MEDICAL DECISION MAKING AND PLAN     · Continue to assess sp02, attempt to wean patient off of oxygen today. Patient still has impressive IS, above 2500. Neuro:  ? Multi-modal: Scheduled Tyl, Ibu, Jarrod, Flexiril, Lido patch  ? PRN Codie  · Cardiac  ? Patient's HR noticably went up to the 110's when asked to sit up in chair for physical exam.    · Pulm:  ? Incentive Spirometry at 2500, acapella  · Heme  ? Stable H and H during hospital stay   · GI/Nutrition  ? Full diet   · /Fluids/Electrolytes  · ID  ? MII: 17.7  ? afebrile  · MSK  ? CTLS clear  ? Ortho Consult: partial WBAT RLE       · Lines   ? B/l PIV     SUBJECTIVE     Summer Ham is unchanged from subjectively yesterday. Patient is on 1.5L of O2 on nasal cannula with good saturation, patient has complaints of pain of the right chest wall, left shoulder, and general aches and pains. Patient was able to get his IS above 2000 while I was in the room. Patient is on a general diet. Patient denies lightheadedness, ringing of the ears, endorses dry mouth, denies shortness of breath, abdominal pain, endorses pain of the right chest and soreness of the left shoulder.        OBJECTIVE  VITALS: Temp: Temp: 97.7 °F (36.5 °C)Temp  Av.5 °F (36.9 °C)  Min: 97.7 °F (36.5 °C)  Max: 99.4 °F (76.7 °C) BP Systolic (36VDF), SMA:501 , Min:106 , Max:153 PM

## 2019-07-19 PROCEDURE — 2060000000 HC ICU INTERMEDIATE R&B

## 2019-07-19 PROCEDURE — 2580000003 HC RX 258: Performed by: STUDENT IN AN ORGANIZED HEALTH CARE EDUCATION/TRAINING PROGRAM

## 2019-07-19 PROCEDURE — 6370000000 HC RX 637 (ALT 250 FOR IP): Performed by: STUDENT IN AN ORGANIZED HEALTH CARE EDUCATION/TRAINING PROGRAM

## 2019-07-19 PROCEDURE — 97116 GAIT TRAINING THERAPY: CPT

## 2019-07-19 PROCEDURE — 2700000000 HC OXYGEN THERAPY PER DAY

## 2019-07-19 PROCEDURE — 6360000002 HC RX W HCPCS: Performed by: STUDENT IN AN ORGANIZED HEALTH CARE EDUCATION/TRAINING PROGRAM

## 2019-07-19 PROCEDURE — 97110 THERAPEUTIC EXERCISES: CPT

## 2019-07-19 PROCEDURE — 94761 N-INVAS EAR/PLS OXIMETRY MLT: CPT

## 2019-07-19 PROCEDURE — 6370000000 HC RX 637 (ALT 250 FOR IP): Performed by: SURGERY

## 2019-07-19 PROCEDURE — 2500000003 HC RX 250 WO HCPCS: Performed by: STUDENT IN AN ORGANIZED HEALTH CARE EDUCATION/TRAINING PROGRAM

## 2019-07-19 RX ORDER — THIAMINE MONONITRATE (VIT B1) 100 MG
100 TABLET ORAL DAILY
Status: CANCELLED | OUTPATIENT
Start: 2019-07-19

## 2019-07-19 RX ORDER — LORAZEPAM 2 MG/ML
1 INJECTION INTRAMUSCULAR
Status: DISCONTINUED | OUTPATIENT
Start: 2019-07-19 | End: 2019-07-20

## 2019-07-19 RX ORDER — FOLIC ACID 1 MG/1
1 TABLET ORAL DAILY
Status: DISCONTINUED | OUTPATIENT
Start: 2019-07-19 | End: 2019-08-15 | Stop reason: HOSPADM

## 2019-07-19 RX ORDER — MULTIVITAMIN WITH FOLIC ACID 400 MCG
1 TABLET ORAL DAILY
Status: DISCONTINUED | OUTPATIENT
Start: 2019-07-19 | End: 2019-08-15 | Stop reason: HOSPADM

## 2019-07-19 RX ORDER — SENNA AND DOCUSATE SODIUM 50; 8.6 MG/1; MG/1
2 TABLET, FILM COATED ORAL DAILY PRN
Qty: 7 TABLET | Refills: 0 | Status: SHIPPED | OUTPATIENT
Start: 2019-07-19 | End: 2019-08-15 | Stop reason: HOSPADM

## 2019-07-19 RX ORDER — LORAZEPAM 2 MG/1
2 TABLET ORAL
Status: DISCONTINUED | OUTPATIENT
Start: 2019-07-19 | End: 2019-07-20

## 2019-07-19 RX ORDER — THIAMINE MONONITRATE (VIT B1) 100 MG
100 TABLET ORAL DAILY
Status: DISCONTINUED | OUTPATIENT
Start: 2019-07-19 | End: 2019-07-20

## 2019-07-19 RX ORDER — LORAZEPAM 2 MG/ML
2 INJECTION INTRAMUSCULAR
Status: DISCONTINUED | OUTPATIENT
Start: 2019-07-19 | End: 2019-07-20

## 2019-07-19 RX ORDER — LORAZEPAM 1 MG/1
1 TABLET ORAL
Status: DISCONTINUED | OUTPATIENT
Start: 2019-07-19 | End: 2019-07-20

## 2019-07-19 RX ORDER — FOLIC ACID 1 MG/1
1 TABLET ORAL DAILY
Status: CANCELLED | OUTPATIENT
Start: 2019-07-19

## 2019-07-19 RX ORDER — SODIUM CHLORIDE 0.9 % (FLUSH) 0.9 %
10 SYRINGE (ML) INJECTION PRN
Status: CANCELLED | OUTPATIENT
Start: 2019-07-19

## 2019-07-19 RX ORDER — SENNA AND DOCUSATE SODIUM 50; 8.6 MG/1; MG/1
2 TABLET, FILM COATED ORAL DAILY PRN
Status: DISCONTINUED | OUTPATIENT
Start: 2019-07-19 | End: 2019-07-22

## 2019-07-19 RX ORDER — SODIUM CHLORIDE 0.9 % (FLUSH) 0.9 %
10 SYRINGE (ML) INJECTION EVERY 12 HOURS SCHEDULED
Status: CANCELLED | OUTPATIENT
Start: 2019-07-19

## 2019-07-19 RX ADMIN — GABAPENTIN 300 MG: 300 CAPSULE ORAL at 17:04

## 2019-07-19 RX ADMIN — CYCLOBENZAPRINE 5 MG: 10 TABLET, FILM COATED ORAL at 01:00

## 2019-07-19 RX ADMIN — CYCLOBENZAPRINE 5 MG: 10 TABLET, FILM COATED ORAL at 17:04

## 2019-07-19 RX ADMIN — OXYCODONE HYDROCHLORIDE 10 MG: 5 TABLET ORAL at 17:04

## 2019-07-19 RX ADMIN — FAMOTIDINE 20 MG: 20 TABLET, FILM COATED ORAL at 08:34

## 2019-07-19 RX ADMIN — LORAZEPAM 2 MG: 2 INJECTION INTRAMUSCULAR; INTRAVENOUS at 20:26

## 2019-07-19 RX ADMIN — FAMOTIDINE 20 MG: 20 TABLET, FILM COATED ORAL at 20:25

## 2019-07-19 RX ADMIN — IBUPROFEN 400 MG: 400 TABLET, FILM COATED ORAL at 23:57

## 2019-07-19 RX ADMIN — IBUPROFEN 400 MG: 400 TABLET, FILM COATED ORAL at 18:41

## 2019-07-19 RX ADMIN — GABAPENTIN 300 MG: 300 CAPSULE ORAL at 08:34

## 2019-07-19 RX ADMIN — OXYCODONE HYDROCHLORIDE 10 MG: 5 TABLET ORAL at 12:24

## 2019-07-19 RX ADMIN — DOCUSATE SODIUM 100 MG: 100 CAPSULE, LIQUID FILLED ORAL at 08:33

## 2019-07-19 RX ADMIN — FOLIC ACID 1 MG: 1 TABLET ORAL at 12:24

## 2019-07-19 RX ADMIN — IBUPROFEN 400 MG: 400 TABLET, FILM COATED ORAL at 12:24

## 2019-07-19 RX ADMIN — Medication 10 ML: at 08:40

## 2019-07-19 RX ADMIN — ACETAMINOPHEN 1000 MG: 500 TABLET ORAL at 18:40

## 2019-07-19 RX ADMIN — ACETAMINOPHEN 1000 MG: 500 TABLET ORAL at 03:46

## 2019-07-19 RX ADMIN — GABAPENTIN 300 MG: 300 CAPSULE ORAL at 01:00

## 2019-07-19 RX ADMIN — FOLIC ACID: 5 INJECTION, SOLUTION INTRAMUSCULAR; INTRAVENOUS; SUBCUTANEOUS at 08:35

## 2019-07-19 RX ADMIN — CYCLOBENZAPRINE 5 MG: 10 TABLET, FILM COATED ORAL at 08:34

## 2019-07-19 RX ADMIN — OXYCODONE HYDROCHLORIDE 10 MG: 5 TABLET ORAL at 08:34

## 2019-07-19 RX ADMIN — ACETAMINOPHEN 1000 MG: 500 TABLET ORAL at 12:23

## 2019-07-19 RX ADMIN — POLYETHYLENE GLYCOL 3350 17 G: 17 POWDER, FOR SOLUTION ORAL at 08:34

## 2019-07-19 RX ADMIN — POTASSIUM CHLORIDE 10 MEQ: 1500 TABLET, EXTENDED RELEASE ORAL at 08:34

## 2019-07-19 RX ADMIN — Medication 100 MG: at 12:24

## 2019-07-19 RX ADMIN — IBUPROFEN 400 MG: 400 TABLET, FILM COATED ORAL at 06:46

## 2019-07-19 RX ADMIN — OXYCODONE HYDROCHLORIDE 10 MG: 5 TABLET ORAL at 03:44

## 2019-07-19 ASSESSMENT — PAIN SCALES - GENERAL
PAINLEVEL_OUTOF10: 7
PAINLEVEL_OUTOF10: 6
PAINLEVEL_OUTOF10: 7
PAINLEVEL_OUTOF10: 0
PAINLEVEL_OUTOF10: 7
PAINLEVEL_OUTOF10: 7
PAINLEVEL_OUTOF10: 5
PAINLEVEL_OUTOF10: 5
PAINLEVEL_OUTOF10: 9
PAINLEVEL_OUTOF10: 0
PAINLEVEL_OUTOF10: 5
PAINLEVEL_OUTOF10: 0
PAINLEVEL_OUTOF10: 2

## 2019-07-19 ASSESSMENT — PAIN DESCRIPTION - LOCATION: LOCATION: RIB CAGE

## 2019-07-19 ASSESSMENT — PAIN DESCRIPTION - DESCRIPTORS: DESCRIPTORS: ACHING

## 2019-07-19 NOTE — PROGRESS NOTES
Physical Therapy  Facility/Department: [facility] 4B STEPDOWN  Daily Treatment Note  NAME: Cheryle Buck  : 1960  MRN: [id number]    Date of Service: 2019    Discharge Recommendations:  Further therapy recommended at discharge. The patient should be able to tolerate at least three hours of therapy per day over 5 days or 15 hours over 7 days. PT Equipment Recommendations  Equipment Needed: Yes  Mobility Devices: Canes  Cane: ibabybox  Other: [facility] for increased stability    Assessment   Body structures, Functions, Activity limitations: Decreased functional mobility ; Decreased endurance;Decreased balance;Decreased strength  Assessment: Pt demos increased NOLBERTO while amb 100 ft with LBQC. Wes during amb for maintaining balance and min VC's for sequencing with a LBQC. During amb O2 sat did not drop bellow 90% with 1.5L of supplemental O2. Pt oriented x4 but admitted to being confused and displays mild confusion throughout session. Prognosis: Good  Patient Education: PT  POC, HEP, importance of mobility, seriousness of injury  REQUIRES PT FOLLOW UP: Yes  Activity Tolerance  Activity Tolerance: Patient limited by pain; Patient limited by fatigue  Activity Tolerance: Pt responded to treatment with no increase of pain or SOB. Patient Diagnosis(es): The primary encounter diagnosis was Fall, initial encounter. A diagnosis of Closed fracture of multiple ribs of right side, initial encounter was also pertinent to this visit. has no past medical history on file. has no past surgical history on file.     Restrictions  Restrictions/Precautions  Restrictions/Precautions: Weight Bearing  Required Braces or Orthoses?: No  Lower Extremity Weight Bearing Restrictions  Right Lower Extremity Weight Bearing: Weight Bearing As Tolerated  Upper Extremity Weight Bearing Restrictions  Left Upper Extremity Weight Bearing: Non Weight Bearing  Position Activity Restriction  Other position/activity restrictions: WBAT R LE, up with assist. CLTS clear, TLSO brace for comfort   Subjective   General  Chart Reviewed: Yes  Response To Previous Treatment: Patient with no complaints from previous session. Family / Caregiver Present: No  Subjective  Subjective: Pt was awake and alert laying supine in room upon writers entry. Pt and RN was agreeable to PT on this date, pain meds administered prior to mobility. Pt on room air with writer entry. RN suggest using supplemental O2 during ex's to maintain O2 WNL. Orientation  Orientation  Overall Orientation Status: Within Normal Limits    Objective   Bed mobility  Rolling to Right: Contact guard assistance  Supine to Sit: Contact guard assistance  Sit to Supine: Contact guard assistance  Scooting: Contact guard assistance  Transfers  Sit to Stand: Contact guard assistance  Stand to sit: Contact guard assistance  Comment: Pt req CGA for verbal cues for safety awareness prior to performing transfers. Ambulation  Ambulation?: Yes  Ambulation 1  Surface: level tile  Device: Large [employer]  Other Apparatus: O2(1.5L of supplemental O2)  Assistance: Minimal assistance  Quality of Gait: Decreased lana, step length, stance phase while standing on R side, very unsteady  Gait Deviations: Slow Lana;Decreased step length;Decreased step height  Distance: amb 100ft with LBQC xminA for sequencing and balance  Comments: O2 was monitored with a portable pulse-ox     Balance  Posture: Good  Sitting - Static: Good  Sitting - Dynamic: Fair;+  Standing - Static: Fair  Standing - Dynamic: Fair;-  Comments: Static standing with no AD. Dynamic assessed with a LBQC. Exercises:  Seated BLE exercise program: Long Arc Quads, hip abduction/adduction, heel/toe raises, and marches.  Reps: x20 AROM      Goals  Short term goals  Time Frame for Short term goals: 10 visits  Short term goal 1: transfers with SBA  Short term goal 2: amb 150 ft with a RW x SBA  Short term goal 3: ascend/descend 4 steps

## 2019-07-20 ENCOUNTER — APPOINTMENT (OUTPATIENT)
Dept: GENERAL RADIOLOGY | Age: 59
DRG: 957 | End: 2019-07-20
Payer: COMMERCIAL

## 2019-07-20 LAB
ABSOLUTE EOS #: 0.13 K/UL (ref 0–0.4)
ABSOLUTE EOS #: 0.31 K/UL (ref 0–0.4)
ABSOLUTE IMMATURE GRANULOCYTE: 0.52 K/UL (ref 0–0.3)
ABSOLUTE IMMATURE GRANULOCYTE: 0.62 K/UL (ref 0–0.3)
ABSOLUTE LYMPH #: 0.92 K/UL (ref 1–4.8)
ABSOLUTE LYMPH #: 0.93 K/UL (ref 1–4.8)
ABSOLUTE MONO #: 2.49 K/UL (ref 0.1–0.8)
ABSOLUTE MONO #: 3.26 K/UL (ref 0.1–0.8)
ALBUMIN SERPL-MCNC: 3 G/DL (ref 3.5–5.2)
ALBUMIN SERPL-MCNC: 3.2 G/DL (ref 3.5–5.2)
ALBUMIN/GLOBULIN RATIO: 0.9 (ref 1–2.5)
ALBUMIN/GLOBULIN RATIO: 0.9 (ref 1–2.5)
ALLEN TEST: POSITIVE
ALP BLD-CCNC: 109 U/L (ref 40–129)
ALP BLD-CCNC: 98 U/L (ref 40–129)
ALT SERPL-CCNC: 26 U/L (ref 5–41)
ALT SERPL-CCNC: 30 U/L (ref 5–41)
AMMONIA: 35 UMOL/L (ref 16–60)
AMMONIA: 65 UMOL/L (ref 16–60)
ANION GAP SERPL CALCULATED.3IONS-SCNC: 14 MMOL/L (ref 9–17)
ANION GAP SERPL CALCULATED.3IONS-SCNC: 15 MMOL/L (ref 9–17)
ANION GAP SERPL CALCULATED.3IONS-SCNC: 19 MMOL/L (ref 9–17)
AST SERPL-CCNC: 39 U/L
AST SERPL-CCNC: 46 U/L
BASOPHILS # BLD: 0 % (ref 0–2)
BASOPHILS # BLD: 0 % (ref 0–2)
BASOPHILS ABSOLUTE: 0 K/UL (ref 0–0.2)
BASOPHILS ABSOLUTE: 0 K/UL (ref 0–0.2)
BILIRUB SERPL-MCNC: 1.31 MG/DL (ref 0.3–1.2)
BILIRUB SERPL-MCNC: 1.5 MG/DL (ref 0.3–1.2)
BILIRUBIN DIRECT: 0.97 MG/DL
BILIRUBIN, INDIRECT: 0.34 MG/DL (ref 0–1)
BLOOD BANK SPECIMEN: NORMAL
BUN BLDV-MCNC: 36 MG/DL (ref 6–20)
BUN BLDV-MCNC: 40 MG/DL (ref 6–20)
BUN BLDV-MCNC: 42 MG/DL (ref 6–20)
BUN/CREAT BLD: ABNORMAL (ref 9–20)
CALCIUM IONIZED: 1.07 MMOL/L (ref 1.13–1.33)
CALCIUM IONIZED: 1.1 MMOL/L (ref 1.13–1.33)
CALCIUM SERPL-MCNC: 7.9 MG/DL (ref 8.6–10.4)
CALCIUM SERPL-MCNC: 8.7 MG/DL (ref 8.6–10.4)
CALCIUM SERPL-MCNC: 8.9 MG/DL (ref 8.6–10.4)
CHLORIDE BLD-SCNC: 90 MMOL/L (ref 98–107)
CHLORIDE BLD-SCNC: 94 MMOL/L (ref 98–107)
CHLORIDE BLD-SCNC: 95 MMOL/L (ref 98–107)
CO2: 22 MMOL/L (ref 20–31)
CO2: 23 MMOL/L (ref 20–31)
CO2: 23 MMOL/L (ref 20–31)
CREAT SERPL-MCNC: 2.04 MG/DL (ref 0.7–1.2)
CREAT SERPL-MCNC: 2.42 MG/DL (ref 0.7–1.2)
CREAT SERPL-MCNC: 2.82 MG/DL (ref 0.7–1.2)
CREATININE URINE: 75.6 MG/DL (ref 39–259)
DIFFERENTIAL TYPE: ABNORMAL
DIFFERENTIAL TYPE: ABNORMAL
EOSINOPHILS RELATIVE PERCENT: 1 % (ref 1–4)
EOSINOPHILS RELATIVE PERCENT: 2 % (ref 1–4)
FIO2: 3
FIO2: 60
FIO2: 80
GFR AFRICAN AMERICAN: 28 ML/MIN
GFR AFRICAN AMERICAN: 34 ML/MIN
GFR AFRICAN AMERICAN: 41 ML/MIN
GFR NON-AFRICAN AMERICAN: 23 ML/MIN
GFR NON-AFRICAN AMERICAN: 28 ML/MIN
GFR NON-AFRICAN AMERICAN: 34 ML/MIN
GFR SERPL CREATININE-BSD FRML MDRD: ABNORMAL ML/MIN/{1.73_M2}
GLOBULIN: ABNORMAL G/DL (ref 1.5–3.8)
GLUCOSE BLD-MCNC: 106 MG/DL (ref 74–100)
GLUCOSE BLD-MCNC: 107 MG/DL (ref 70–99)
GLUCOSE BLD-MCNC: 96 MG/DL (ref 70–99)
GLUCOSE BLD-MCNC: 97 MG/DL (ref 70–99)
GLUCOSE BLD-MCNC: 99 MG/DL (ref 75–110)
HCT VFR BLD CALC: 20.4 % (ref 40.7–50.3)
HCT VFR BLD CALC: 25.2 % (ref 40.7–50.3)
HCT VFR BLD CALC: 26.8 % (ref 40.7–50.3)
HCT VFR BLD CALC: 27 % (ref 40.7–50.3)
HEMOGLOBIN: 6.8 G/DL (ref 13–17)
HEMOGLOBIN: 8.1 G/DL (ref 13–17)
HEMOGLOBIN: 8.7 G/DL (ref 13–17)
HEMOGLOBIN: 8.9 G/DL (ref 13–17)
IMMATURE GRANULOCYTES: 4 %
IMMATURE GRANULOCYTES: 4 %
INR BLD: 0.9
LACTIC ACID, SEPSIS WHOLE BLOOD: 1.3 MMOL/L (ref 0.5–1.9)
LACTIC ACID, SEPSIS WHOLE BLOOD: 1.5 MMOL/L (ref 0.5–1.9)
LACTIC ACID, SEPSIS: NORMAL MMOL/L (ref 0.5–1.9)
LACTIC ACID, SEPSIS: NORMAL MMOL/L (ref 0.5–1.9)
LACTIC ACID, WHOLE BLOOD: 1.1 MMOL/L (ref 0.7–2.1)
LYMPHOCYTES # BLD: 6 % (ref 24–44)
LYMPHOCYTES # BLD: 7 % (ref 24–44)
MAGNESIUM: 2.4 MG/DL (ref 1.6–2.6)
MAGNESIUM: 2.4 MG/DL (ref 1.6–2.6)
MCH RBC QN AUTO: 31.4 PG (ref 25.2–33.5)
MCH RBC QN AUTO: 31.8 PG (ref 25.2–33.5)
MCHC RBC AUTO-ENTMCNC: 32.1 G/DL (ref 28.4–34.8)
MCHC RBC AUTO-ENTMCNC: 33 G/DL (ref 28.4–34.8)
MCV RBC AUTO: 96.4 FL (ref 82.6–102.9)
MCV RBC AUTO: 97.7 FL (ref 82.6–102.9)
MODE: ABNORMAL
MONOCYTES # BLD: 19 % (ref 1–7)
MONOCYTES # BLD: 21 % (ref 1–7)
MORPHOLOGY: ABNORMAL
MORPHOLOGY: ABNORMAL
NEGATIVE BASE EXCESS, ART: ABNORMAL (ref 0–2)
NRBC AUTOMATED: 0.2 PER 100 WBC
NRBC AUTOMATED: 0.4 PER 100 WBC
NUCLEATED RED BLOOD CELLS: 1 PER 100 WBC
O2 DEVICE/FLOW/%: ABNORMAL
PATIENT TEMP: ABNORMAL
PDW BLD-RTO: 12.2 % (ref 11.8–14.4)
PDW BLD-RTO: 12.3 % (ref 11.8–14.4)
PHOSPHORUS: 5 MG/DL (ref 2.5–4.5)
PHOSPHORUS: 5.1 MG/DL (ref 2.5–4.5)
PLATELET # BLD: 256 K/UL (ref 138–453)
PLATELET # BLD: 267 K/UL (ref 138–453)
PLATELET ESTIMATE: ABNORMAL
PLATELET ESTIMATE: ABNORMAL
PMV BLD AUTO: 9.3 FL (ref 8.1–13.5)
PMV BLD AUTO: 9.4 FL (ref 8.1–13.5)
POC HCO3: 27 MMOL/L (ref 21–28)
POC HCO3: 27.6 MMOL/L (ref 21–28)
POC HCO3: 28 MMOL/L (ref 21–28)
POC LACTIC ACID: 0.41 MMOL/L (ref 0.56–1.39)
POC LACTIC ACID: 0.5 MMOL/L (ref 0.56–1.39)
POC O2 SATURATION: 78 % (ref 94–98)
POC O2 SATURATION: 98 % (ref 94–98)
POC O2 SATURATION: 99 % (ref 94–98)
POC PCO2 TEMP: ABNORMAL MM HG
POC PCO2: 43.4 MM HG (ref 35–48)
POC PCO2: 43.7 MM HG (ref 35–48)
POC PCO2: 57.2 MM HG (ref 35–48)
POC PH TEMP: ABNORMAL
POC PH: 7.3 (ref 7.35–7.45)
POC PH: 7.4 (ref 7.35–7.45)
POC PH: 7.41 (ref 7.35–7.45)
POC PO2 TEMP: ABNORMAL MM HG
POC PO2: 111.6 MM HG (ref 83–108)
POC PO2: 150 MM HG (ref 83–108)
POC PO2: 42.5 MM HG (ref 83–108)
POSITIVE BASE EXCESS, ART: 1 (ref 0–3)
POSITIVE BASE EXCESS, ART: 2 (ref 0–3)
POSITIVE BASE EXCESS, ART: 3 (ref 0–3)
POTASSIUM SERPL-SCNC: 3.4 MMOL/L (ref 3.7–5.3)
POTASSIUM SERPL-SCNC: 4.2 MMOL/L (ref 3.7–5.3)
POTASSIUM SERPL-SCNC: 4.3 MMOL/L (ref 3.7–5.3)
PROTHROMBIN TIME: 10.1 SEC (ref 9–12)
RBC # BLD: 2.58 M/UL (ref 4.21–5.77)
RBC # BLD: 2.8 M/UL (ref 4.21–5.77)
RBC # BLD: ABNORMAL 10*6/UL
RBC # BLD: ABNORMAL 10*6/UL
SAMPLE SITE: ABNORMAL
SEG NEUTROPHILS: 67 % (ref 36–66)
SEG NEUTROPHILS: 69 % (ref 36–66)
SEGMENTED NEUTROPHILS ABSOLUTE COUNT: 10.38 K/UL (ref 1.8–7.7)
SEGMENTED NEUTROPHILS ABSOLUTE COUNT: 9.04 K/UL (ref 1.8–7.7)
SODIUM BLD-SCNC: 128 MMOL/L (ref 135–144)
SODIUM BLD-SCNC: 132 MMOL/L (ref 135–144)
SODIUM BLD-SCNC: 135 MMOL/L (ref 135–144)
SODIUM,UR: 37 MMOL/L
TCO2 (CALC), ART: 28 MMOL/L (ref 22–29)
TCO2 (CALC), ART: 29 MMOL/L (ref 22–29)
TCO2 (CALC), ART: 30 MMOL/L (ref 22–29)
TOTAL PROTEIN: 6.3 G/DL (ref 6.4–8.3)
TOTAL PROTEIN: 6.9 G/DL (ref 6.4–8.3)
TROPONIN INTERP: NORMAL
TROPONIN T: NORMAL NG/ML
TROPONIN, HIGH SENSITIVITY: 21 NG/L (ref 0–22)
WBC # BLD: 13.1 K/UL (ref 3.5–11.3)
WBC # BLD: 15.5 K/UL (ref 3.5–11.3)
WBC # BLD: ABNORMAL 10*3/UL
WBC # BLD: ABNORMAL 10*3/UL

## 2019-07-20 PROCEDURE — 83605 ASSAY OF LACTIC ACID: CPT

## 2019-07-20 PROCEDURE — 82803 BLOOD GASES ANY COMBINATION: CPT

## 2019-07-20 PROCEDURE — 86920 COMPATIBILITY TEST SPIN: CPT

## 2019-07-20 PROCEDURE — 02HV33Z INSERTION OF INFUSION DEVICE INTO SUPERIOR VENA CAVA, PERCUTANEOUS APPROACH: ICD-10-PCS | Performed by: SURGERY

## 2019-07-20 PROCEDURE — 6360000002 HC RX W HCPCS: Performed by: STUDENT IN AN ORGANIZED HEALTH CARE EDUCATION/TRAINING PROGRAM

## 2019-07-20 PROCEDURE — 71045 X-RAY EXAM CHEST 1 VIEW: CPT

## 2019-07-20 PROCEDURE — 6370000000 HC RX 637 (ALT 250 FOR IP): Performed by: STUDENT IN AN ORGANIZED HEALTH CARE EDUCATION/TRAINING PROGRAM

## 2019-07-20 PROCEDURE — 93005 ELECTROCARDIOGRAM TRACING: CPT | Performed by: STUDENT IN AN ORGANIZED HEALTH CARE EDUCATION/TRAINING PROGRAM

## 2019-07-20 PROCEDURE — 82140 ASSAY OF AMMONIA: CPT

## 2019-07-20 PROCEDURE — 2580000003 HC RX 258: Performed by: STUDENT IN AN ORGANIZED HEALTH CARE EDUCATION/TRAINING PROGRAM

## 2019-07-20 PROCEDURE — 0W9930Z DRAINAGE OF RIGHT PLEURAL CAVITY WITH DRAINAGE DEVICE, PERCUTANEOUS APPROACH: ICD-10-PCS | Performed by: SURGERY

## 2019-07-20 PROCEDURE — 86850 RBC ANTIBODY SCREEN: CPT

## 2019-07-20 PROCEDURE — 2500000003 HC RX 250 WO HCPCS: Performed by: STUDENT IN AN ORGANIZED HEALTH CARE EDUCATION/TRAINING PROGRAM

## 2019-07-20 PROCEDURE — 85014 HEMATOCRIT: CPT

## 2019-07-20 PROCEDURE — 94770 HC ETCO2 MONITOR DAILY: CPT

## 2019-07-20 PROCEDURE — 83735 ASSAY OF MAGNESIUM: CPT

## 2019-07-20 PROCEDURE — 2000000000 HC ICU R&B

## 2019-07-20 PROCEDURE — 94761 N-INVAS EAR/PLS OXIMETRY MLT: CPT

## 2019-07-20 PROCEDURE — 86900 BLOOD TYPING SEROLOGIC ABO: CPT

## 2019-07-20 PROCEDURE — 85025 COMPLETE CBC W/AUTO DIFF WBC: CPT

## 2019-07-20 PROCEDURE — 82570 ASSAY OF URINE CREATININE: CPT

## 2019-07-20 PROCEDURE — 84300 ASSAY OF URINE SODIUM: CPT

## 2019-07-20 PROCEDURE — 87086 URINE CULTURE/COLONY COUNT: CPT

## 2019-07-20 PROCEDURE — 85610 PROTHROMBIN TIME: CPT

## 2019-07-20 PROCEDURE — 87040 BLOOD CULTURE FOR BACTERIA: CPT

## 2019-07-20 PROCEDURE — 80048 BASIC METABOLIC PNL TOTAL CA: CPT

## 2019-07-20 PROCEDURE — 0BH18EZ INSERTION OF ENDOTRACHEAL AIRWAY INTO TRACHEA, VIA NATURAL OR ARTIFICIAL OPENING ENDOSCOPIC: ICD-10-PCS | Performed by: SURGERY

## 2019-07-20 PROCEDURE — 80053 COMPREHEN METABOLIC PANEL: CPT

## 2019-07-20 PROCEDURE — 80076 HEPATIC FUNCTION PANEL: CPT

## 2019-07-20 PROCEDURE — 2700000000 HC OXYGEN THERAPY PER DAY

## 2019-07-20 PROCEDURE — 94002 VENT MGMT INPAT INIT DAY: CPT

## 2019-07-20 PROCEDURE — 6360000002 HC RX W HCPCS

## 2019-07-20 PROCEDURE — 36600 WITHDRAWAL OF ARTERIAL BLOOD: CPT

## 2019-07-20 PROCEDURE — 82947 ASSAY GLUCOSE BLOOD QUANT: CPT

## 2019-07-20 PROCEDURE — 36415 COLL VENOUS BLD VENIPUNCTURE: CPT

## 2019-07-20 PROCEDURE — 36430 TRANSFUSION BLD/BLD COMPNT: CPT

## 2019-07-20 PROCEDURE — 84484 ASSAY OF TROPONIN QUANT: CPT

## 2019-07-20 PROCEDURE — 82330 ASSAY OF CALCIUM: CPT

## 2019-07-20 PROCEDURE — 85018 HEMOGLOBIN: CPT

## 2019-07-20 PROCEDURE — P9016 RBC LEUKOCYTES REDUCED: HCPCS

## 2019-07-20 PROCEDURE — 86901 BLOOD TYPING SEROLOGIC RH(D): CPT

## 2019-07-20 PROCEDURE — 84100 ASSAY OF PHOSPHORUS: CPT

## 2019-07-20 PROCEDURE — 94660 CPAP INITIATION&MGMT: CPT

## 2019-07-20 RX ORDER — CHLORHEXIDINE GLUCONATE 0.12 MG/ML
15 RINSE ORAL 2 TIMES DAILY
Status: DISCONTINUED | OUTPATIENT
Start: 2019-07-20 | End: 2019-07-22

## 2019-07-20 RX ORDER — SODIUM CHLORIDE, SODIUM LACTATE, POTASSIUM CHLORIDE, AND CALCIUM CHLORIDE .6; .31; .03; .02 G/100ML; G/100ML; G/100ML; G/100ML
1000 INJECTION, SOLUTION INTRAVENOUS ONCE
Status: COMPLETED | OUTPATIENT
Start: 2019-07-20 | End: 2019-07-20

## 2019-07-20 RX ORDER — LIDOCAINE HYDROCHLORIDE 40 MG/ML
4 INJECTION, SOLUTION RETROBULBAR; TOPICAL
Status: DISCONTINUED | OUTPATIENT
Start: 2019-07-20 | End: 2019-08-15 | Stop reason: HOSPADM

## 2019-07-20 RX ORDER — THIAMINE HYDROCHLORIDE 100 MG/ML
500 INJECTION, SOLUTION INTRAMUSCULAR; INTRAVENOUS 3 TIMES DAILY
Status: DISCONTINUED | OUTPATIENT
Start: 2019-07-20 | End: 2019-07-20

## 2019-07-20 RX ORDER — THIAMINE MONONITRATE (VIT B1) 100 MG
100 TABLET ORAL DAILY
Status: DISCONTINUED | OUTPATIENT
Start: 2019-07-22 | End: 2019-07-22

## 2019-07-20 RX ORDER — SODIUM CHLORIDE, SODIUM LACTATE, POTASSIUM CHLORIDE, CALCIUM CHLORIDE 600; 310; 30; 20 MG/100ML; MG/100ML; MG/100ML; MG/100ML
INJECTION, SOLUTION INTRAVENOUS CONTINUOUS
Status: DISCONTINUED | OUTPATIENT
Start: 2019-07-20 | End: 2019-07-22

## 2019-07-20 RX ORDER — CALCIUM CARBONATE 200(500)MG
500 TABLET,CHEWABLE ORAL DAILY PRN
Status: DISCONTINUED | OUTPATIENT
Start: 2019-07-20 | End: 2019-07-20

## 2019-07-20 RX ORDER — SODIUM CHLORIDE 0.9 % (FLUSH) 0.9 %
10 SYRINGE (ML) INJECTION EVERY 12 HOURS SCHEDULED
Status: DISCONTINUED | OUTPATIENT
Start: 2019-07-20 | End: 2019-08-07

## 2019-07-20 RX ORDER — ALBUTEROL SULFATE 2.5 MG/3ML
2.5 SOLUTION RESPIRATORY (INHALATION) EVERY 4 HOURS PRN
Status: DISCONTINUED | OUTPATIENT
Start: 2019-07-20 | End: 2019-07-22

## 2019-07-20 RX ORDER — SODIUM CHLORIDE 0.9 % (FLUSH) 0.9 %
10 SYRINGE (ML) INJECTION PRN
Status: DISCONTINUED | OUTPATIENT
Start: 2019-07-20 | End: 2019-08-07

## 2019-07-20 RX ORDER — POTASSIUM CHLORIDE 20MEQ/15ML
40 LIQUID (ML) ORAL DAILY
Status: DISCONTINUED | OUTPATIENT
Start: 2019-07-21 | End: 2019-07-21

## 2019-07-20 RX ORDER — PROPOFOL 10 MG/ML
INJECTION, EMULSION INTRAVENOUS
Status: COMPLETED
Start: 2019-07-20 | End: 2019-07-20

## 2019-07-20 RX ORDER — ETOMIDATE 2 MG/ML
30 INJECTION INTRAVENOUS ONCE
Status: COMPLETED | OUTPATIENT
Start: 2019-07-20 | End: 2019-07-20

## 2019-07-20 RX ORDER — PROPOFOL 10 MG/ML
10 INJECTION, EMULSION INTRAVENOUS
Status: DISCONTINUED | OUTPATIENT
Start: 2019-07-20 | End: 2019-07-20

## 2019-07-20 RX ORDER — ACETYLCYSTEINE 200 MG/ML
600 SOLUTION ORAL; RESPIRATORY (INHALATION)
Status: DISCONTINUED | OUTPATIENT
Start: 2019-07-20 | End: 2019-07-20

## 2019-07-20 RX ORDER — ALBUTEROL SULFATE 2.5 MG/3ML
2.5 SOLUTION RESPIRATORY (INHALATION)
Status: DISCONTINUED | OUTPATIENT
Start: 2019-07-20 | End: 2019-07-20

## 2019-07-20 RX ORDER — 0.9 % SODIUM CHLORIDE 0.9 %
250 INTRAVENOUS SOLUTION INTRAVENOUS ONCE
Status: DISCONTINUED | OUTPATIENT
Start: 2019-07-20 | End: 2019-07-30

## 2019-07-20 RX ORDER — SUCCINYLCHOLINE CHLORIDE 20 MG/ML
100 INJECTION INTRAMUSCULAR; INTRAVENOUS ONCE
Status: COMPLETED | OUTPATIENT
Start: 2019-07-20 | End: 2019-07-20

## 2019-07-20 RX ORDER — DIAZEPAM 5 MG/1
5 TABLET ORAL EVERY 6 HOURS
Status: DISCONTINUED | OUTPATIENT
Start: 2019-07-20 | End: 2019-07-22

## 2019-07-20 RX ADMIN — SUCCINYLCHOLINE CHLORIDE 100 MG: 20 INJECTION, SOLUTION INTRAMUSCULAR; INTRAVENOUS at 11:45

## 2019-07-20 RX ADMIN — PROPOFOL 20 MCG/KG/MIN: 10 INJECTION, EMULSION INTRAVENOUS at 11:54

## 2019-07-20 RX ADMIN — FAMOTIDINE 20 MG: 20 TABLET, FILM COATED ORAL at 19:44

## 2019-07-20 RX ADMIN — DEXMEDETOMIDINE HYDROCHLORIDE 0.2 MCG/KG/HR: 100 INJECTION, SOLUTION INTRAVENOUS at 10:40

## 2019-07-20 RX ADMIN — DIAZEPAM 5 MG: 5 TABLET ORAL at 17:54

## 2019-07-20 RX ADMIN — IBUPROFEN 400 MG: 400 TABLET, FILM COATED ORAL at 19:44

## 2019-07-20 RX ADMIN — GABAPENTIN 300 MG: 300 CAPSULE ORAL at 01:06

## 2019-07-20 RX ADMIN — CYCLOBENZAPRINE 5 MG: 10 TABLET, FILM COATED ORAL at 17:54

## 2019-07-20 RX ADMIN — ENOXAPARIN SODIUM 30 MG: 100 INJECTION SUBCUTANEOUS at 19:45

## 2019-07-20 RX ADMIN — DEXMEDETOMIDINE HYDROCHLORIDE 0.2 MCG/KG/HR: 100 INJECTION, SOLUTION INTRAVENOUS at 17:30

## 2019-07-20 RX ADMIN — ETOMIDATE 30 MG: 2 INJECTION INTRAVENOUS at 11:45

## 2019-07-20 RX ADMIN — GABAPENTIN 300 MG: 300 CAPSULE ORAL at 17:53

## 2019-07-20 RX ADMIN — SODIUM CHLORIDE, PRESERVATIVE FREE 10 ML: 5 INJECTION INTRAVENOUS at 19:51

## 2019-07-20 RX ADMIN — SODIUM CHLORIDE, POTASSIUM CHLORIDE, SODIUM LACTATE AND CALCIUM CHLORIDE 1000 ML: 600; 310; 30; 20 INJECTION, SOLUTION INTRAVENOUS at 09:30

## 2019-07-20 RX ADMIN — Medication 25 MCG/HR: at 14:51

## 2019-07-20 RX ADMIN — SODIUM CHLORIDE, POTASSIUM CHLORIDE, SODIUM LACTATE AND CALCIUM CHLORIDE 1000 ML: 600; 310; 30; 20 INJECTION, SOLUTION INTRAVENOUS at 20:45

## 2019-07-20 RX ADMIN — CYCLOBENZAPRINE 5 MG: 10 TABLET, FILM COATED ORAL at 01:06

## 2019-07-20 RX ADMIN — THIAMINE HYDROCHLORIDE 500 MG: 100 INJECTION, SOLUTION INTRAMUSCULAR; INTRAVENOUS at 19:45

## 2019-07-20 RX ADMIN — LORAZEPAM 2 MG: 2 INJECTION INTRAMUSCULAR; INTRAVENOUS at 09:27

## 2019-07-20 RX ADMIN — LORAZEPAM 1 MG: 2 INJECTION INTRAMUSCULAR at 05:40

## 2019-07-20 RX ADMIN — Medication 10 ML: at 19:45

## 2019-07-20 RX ADMIN — LORAZEPAM 2 MG: 2 INJECTION INTRAMUSCULAR; INTRAVENOUS at 08:28

## 2019-07-20 RX ADMIN — ACETAMINOPHEN 1000 MG: 500 TABLET ORAL at 19:44

## 2019-07-20 RX ADMIN — LORAZEPAM 2 MG: 2 INJECTION INTRAMUSCULAR; INTRAVENOUS at 10:58

## 2019-07-20 RX ADMIN — SODIUM CHLORIDE, POTASSIUM CHLORIDE, SODIUM LACTATE AND CALCIUM CHLORIDE: 600; 310; 30; 20 INJECTION, SOLUTION INTRAVENOUS at 13:28

## 2019-07-20 RX ADMIN — SODIUM CHLORIDE, POTASSIUM CHLORIDE, SODIUM LACTATE AND CALCIUM CHLORIDE 1000 ML: 600; 310; 30; 20 INJECTION, SOLUTION INTRAVENOUS at 18:40

## 2019-07-20 RX ADMIN — CALCIUM GLUCONATE 1 G: 98 INJECTION, SOLUTION INTRAVENOUS at 17:47

## 2019-07-20 RX ADMIN — POTASSIUM CHLORIDE 10 MEQ: 1500 TABLET, EXTENDED RELEASE ORAL at 19:44

## 2019-07-20 ASSESSMENT — PULMONARY FUNCTION TESTS
PIF_VALUE: 26
PIF_VALUE: 40
PIF_VALUE: 20
PIF_VALUE: 27
PIF_VALUE: 20
PIF_VALUE: 14
PIF_VALUE: 16

## 2019-07-20 ASSESSMENT — PAIN SCALES - GENERAL: PAINLEVEL_OUTOF10: 0

## 2019-07-20 NOTE — PROGRESS NOTES
Nutrition Assessment (Enteral Nutrition)    Type and Reason for Visit: Initial, Consult(Vent protocol - TF recommendations)    Nutrition Recommendations: TF recommendations: Continuous feeds of Pivot 1.5 (immune enhancing) at 55 mL/hr = 1980 kcals, 124 gm protein. Nutrition Assessment: Pt intubated today. Malnutrition Assessment:  · Malnutrition Status: Insufficient data    Nutrition Risk Level: High    Nutrition Needs:  · Estimated Daily Total Kcal: 2100 kcals/day  · Estimated Daily Protein (g): 105-125 gm/day    Nutrition Diagnosis:   · Problem: Inadequate oral intake  · Etiology: related to Impaired respiratory function-inability to consume food     Signs and symptoms:  as evidenced by NPO status due to medical condition    Objective Information:  · Wound Type: (Excoriation of right hip/buttock noted)  · Current Nutrition Therapies:  · Oral Diet Orders: NPO   · Additional Calories: Propofol at 5.2 mL/hr = 137 kcals/day  · Anthropometric Measures:  · Ht: 6' 1\" (185.4 cm)   · Current Body Wt: 191 lb 5.8 oz (86.8 kg)  · Admission Body Wt: 194 lb 0.1 oz (88 kg)  · Weight Change: PHIL   · Ideal Body Wt: 184 lb (83.5 kg), % Ideal Body 104%  · BMI Classification: BMI 25.0 - 29.9 Overweight    Nutrition Interventions:   Continue NPO  Continued Inpatient Monitoring, Education Not Indicated    Nutrition Evaluation:   · Evaluation: Goals set   · Goals: Meet % of estimated nutrient needs.    · Monitoring: Nutrition Progression, Pertinent Labs, Weight, Monitor Hemodynamic Status      Electronically signed by Spike Ramos MS, RD, LD on 7/20/19 at 2:57 PM    Contact Number: [phone]

## 2019-07-20 NOTE — PROCEDURES
PROCEDURE NOTE - EMERGENCY INTUBATION    PATIENT NAME: Avery Market  MEDICAL RECORD NO. [id number]  DATE: 7/20/2019  SURGEON: Dr. Krysta Dumont: Cee Quispe MD    PREOPERATIVE DIAGNOSIS:  Acute Respiratory Failure, pt desaturated to 40%, became bradycardic to the 30-40's. POSTOPERATIVE DIAGNOSIS:  Same  PROCEDURE PERFORMED:  Emergency endotracheal intubation  ATTENDING SURGEON: Dr. Angela Almaraz: Roberta Blunt DO  COMPLICATIONS:  None  OPERATIVE NOTE PREPARED BY: Roberta Blunt DO    MEDICATIONS: 30 mcg etomidate intravenously and 100 mcg succinycholine intravenously    DISCUSSION:  Avery Alejandre is a 63y.o.-year-old male who requires intubation and ventilatory support due to Respiratory failure. Pt has a hx of multiple rib fractures. He was transferred to the ICU this morning due to worsening respiratory status and concern for withdrawal sxs. Pt was placed on bi pap and tolerated fine for 1-2 hrs but then became unresponsive, O2 sat dropped to 40%, and his HR dropped to the 30-40's. Decision was made to emergently intubated the pt. The history and physical examination were reviewed and confirmed. CONSENT: Unable to be obtained due to patient's condition. PROCEDURE:  A timeout was initiated by the bedside nurse and was confirmed by those present. The patient was placed in the appropriate position. Cricoid pressure was not required. Intubation was performed by direct laryngoscopy using a laryngoscope and a 7.5 cuffed endotracheal tube. The cuff was then inflated and the tube was secured appropriately at a distance of 25 cm to the dental ridge. Initial confirmation of placement included bilateral breath sounds, an end tidal CO2 detector, absence of sounds over the stomach, tube fogging, adequate chest rise, adequate pulse oximetry reading and improved skin color.   A chest x-ray to verify correct placement of the tube showed appropriate tube position. The patient tolerated the procedure well. Nan Bridges,   7/20/19, 12:04 PM       Trauma, Emergency and Critical Surgical Services  Attending Note      I have reviewed the above TECSS note(s) and confirmed the key elements of the medical history and physical exam. I have discussed the findings, established the care plan and recommendations with Resident, TECSS RN, bedside nurse. Patient with decreasing respiratory function as well as probable alcohol withdrawal and emergently intubated. Review CXR shows persistent effusion rt side, chest tube placed. Total critical care time reviewing studies, etc 45 minutes.     Bekah Gurrola MD  7/20/2019  12:57 PM

## 2019-07-20 NOTE — PROGRESS NOTES
Mr Dobbins Herd with increasing abdominal breathing, was placed on NIV as charted with slight improvement. Awaiting an ICU bed.

## 2019-07-20 NOTE — FLOWSHEET NOTE
DATE: 2019    NAME: Charli Cowart  MRN: [id number]   : 1960    Patient not seen this date for Physical Therapy due to:  [] Blood transfusion in progress  [] Hemodialysis  []  Patient Declined  [] Spine Precautions   [] Strict Bedrest  [] Surgery/ Procedure  [] Testing      [x] Other: pt condition, transferred back to ICU, check 19. [] PT being discontinued at this time. Patient independent. No further needs. [] PT being discontinued at this time as the patient has been transferred to palliative care. No further needs.     Elle Calles, PTA

## 2019-07-20 NOTE — PROGRESS NOTES
Daily prog note to follow    Pt w/ worsening agitation, mentation and increased RR w/ tachycardia. Pt placed on bipap w/ stat labs and CXR ordered. New onset MOE. Urine labs now pending. Pt transferred to ICU for further care. Pt's son updated on new changes and plan of care.      Electronically signed by Tori Pires DO on 7/20/2019 at 10:52 AM

## 2019-07-21 ENCOUNTER — APPOINTMENT (OUTPATIENT)
Dept: GENERAL RADIOLOGY | Age: 59
DRG: 957 | End: 2019-07-21
Payer: COMMERCIAL

## 2019-07-21 LAB
ABSOLUTE EOS #: 0.16 K/UL (ref 0–0.4)
ABSOLUTE IMMATURE GRANULOCYTE: 0.16 K/UL (ref 0–0.3)
ABSOLUTE LYMPH #: 1.11 K/UL (ref 1–4.8)
ABSOLUTE MONO #: 0.95 K/UL (ref 0.1–0.8)
ALLEN TEST: ABNORMAL
ALLEN TEST: ABNORMAL
ANION GAP SERPL CALCULATED.3IONS-SCNC: 11 MMOL/L (ref 9–17)
BASOPHILS # BLD: 0 % (ref 0–2)
BASOPHILS ABSOLUTE: 0 K/UL (ref 0–0.2)
BUN BLDV-MCNC: 37 MG/DL (ref 6–20)
BUN/CREAT BLD: ABNORMAL (ref 9–20)
CALCIUM IONIZED: 1.11 MMOL/L (ref 1.13–1.33)
CALCIUM SERPL-MCNC: 7.9 MG/DL (ref 8.6–10.4)
CHLORIDE BLD-SCNC: 97 MMOL/L (ref 98–107)
CO2: 25 MMOL/L (ref 20–31)
CREAT SERPL-MCNC: 1.91 MG/DL (ref 0.7–1.2)
CULTURE: NO GROWTH
DIFFERENTIAL TYPE: ABNORMAL
EOSINOPHILS RELATIVE PERCENT: 2 % (ref 1–4)
FIO2: 30
FIO2: 50
GFR AFRICAN AMERICAN: 44 ML/MIN
GFR NON-AFRICAN AMERICAN: 36 ML/MIN
GFR SERPL CREATININE-BSD FRML MDRD: ABNORMAL ML/MIN/{1.73_M2}
GFR SERPL CREATININE-BSD FRML MDRD: ABNORMAL ML/MIN/{1.73_M2}
GLUCOSE BLD-MCNC: 89 MG/DL (ref 70–99)
HCT VFR BLD CALC: 22.4 % (ref 40.7–50.3)
HCT VFR BLD CALC: 24.1 % (ref 40.7–50.3)
HEMOGLOBIN: 7.4 G/DL (ref 13–17)
HEMOGLOBIN: 7.8 G/DL (ref 13–17)
IMMATURE GRANULOCYTES: 2 %
LYMPHOCYTES # BLD: 14 % (ref 24–44)
Lab: NORMAL
MAGNESIUM: 2.1 MG/DL (ref 1.6–2.6)
MCH RBC QN AUTO: 30.5 PG (ref 25.2–33.5)
MCHC RBC AUTO-ENTMCNC: 32.4 G/DL (ref 28.4–34.8)
MCV RBC AUTO: 94.1 FL (ref 82.6–102.9)
MODE: ABNORMAL
MODE: ABNORMAL
MONOCYTES # BLD: 12 % (ref 1–7)
MORPHOLOGY: NORMAL
NEGATIVE BASE EXCESS, ART: ABNORMAL (ref 0–2)
NEGATIVE BASE EXCESS, ART: ABNORMAL (ref 0–2)
NRBC AUTOMATED: 0 PER 100 WBC
O2 DEVICE/FLOW/%: ABNORMAL
O2 DEVICE/FLOW/%: ABNORMAL
PATIENT TEMP: ABNORMAL
PATIENT TEMP: ABNORMAL
PDW BLD-RTO: 12.7 % (ref 11.8–14.4)
PHOSPHORUS: 3.1 MG/DL (ref 2.5–4.5)
PLATELET # BLD: 203 K/UL (ref 138–453)
PLATELET ESTIMATE: ABNORMAL
PMV BLD AUTO: 9.2 FL (ref 8.1–13.5)
POC HCO3: 26.9 MMOL/L (ref 21–28)
POC HCO3: 27.8 MMOL/L (ref 21–28)
POC O2 SATURATION: 96 % (ref 94–98)
POC O2 SATURATION: 99 % (ref 94–98)
POC PCO2 TEMP: ABNORMAL MM HG
POC PCO2 TEMP: ABNORMAL MM HG
POC PCO2: 39.8 MM HG (ref 35–48)
POC PCO2: 40.7 MM HG (ref 35–48)
POC PH TEMP: ABNORMAL
POC PH TEMP: ABNORMAL
POC PH: 7.44 (ref 7.35–7.45)
POC PH: 7.44 (ref 7.35–7.45)
POC PO2 TEMP: ABNORMAL MM HG
POC PO2 TEMP: ABNORMAL MM HG
POC PO2: 129.4 MM HG (ref 83–108)
POC PO2: 79.2 MM HG (ref 83–108)
POSITIVE BASE EXCESS, ART: 2 (ref 0–3)
POSITIVE BASE EXCESS, ART: 3 (ref 0–3)
POTASSIUM SERPL-SCNC: 3.4 MMOL/L (ref 3.7–5.3)
RBC # BLD: 2.56 M/UL (ref 4.21–5.77)
RBC # BLD: ABNORMAL 10*6/UL
SAMPLE SITE: ABNORMAL
SAMPLE SITE: ABNORMAL
SEG NEUTROPHILS: 70 % (ref 36–66)
SEGMENTED NEUTROPHILS ABSOLUTE COUNT: 5.52 K/UL (ref 1.8–7.7)
SODIUM BLD-SCNC: 133 MMOL/L (ref 135–144)
SPECIMEN DESCRIPTION: NORMAL
TCO2 (CALC), ART: 28 MMOL/L (ref 22–29)
TCO2 (CALC), ART: 29 MMOL/L (ref 22–29)
WBC # BLD: 7.9 K/UL (ref 3.5–11.3)
WBC # BLD: ABNORMAL 10*3/UL

## 2019-07-21 PROCEDURE — 2580000003 HC RX 258: Performed by: STUDENT IN AN ORGANIZED HEALTH CARE EDUCATION/TRAINING PROGRAM

## 2019-07-21 PROCEDURE — 6370000000 HC RX 637 (ALT 250 FOR IP): Performed by: STUDENT IN AN ORGANIZED HEALTH CARE EDUCATION/TRAINING PROGRAM

## 2019-07-21 PROCEDURE — 6360000002 HC RX W HCPCS: Performed by: STUDENT IN AN ORGANIZED HEALTH CARE EDUCATION/TRAINING PROGRAM

## 2019-07-21 PROCEDURE — 80048 BASIC METABOLIC PNL TOTAL CA: CPT

## 2019-07-21 PROCEDURE — 36430 TRANSFUSION BLD/BLD COMPNT: CPT

## 2019-07-21 PROCEDURE — 37799 UNLISTED PX VASCULAR SURGERY: CPT

## 2019-07-21 PROCEDURE — 84100 ASSAY OF PHOSPHORUS: CPT

## 2019-07-21 PROCEDURE — 82330 ASSAY OF CALCIUM: CPT

## 2019-07-21 PROCEDURE — 83735 ASSAY OF MAGNESIUM: CPT

## 2019-07-21 PROCEDURE — 94770 HC ETCO2 MONITOR DAILY: CPT

## 2019-07-21 PROCEDURE — 85014 HEMATOCRIT: CPT

## 2019-07-21 PROCEDURE — 85018 HEMOGLOBIN: CPT

## 2019-07-21 PROCEDURE — 2000000000 HC ICU R&B

## 2019-07-21 PROCEDURE — 6370000000 HC RX 637 (ALT 250 FOR IP): Performed by: SURGERY

## 2019-07-21 PROCEDURE — 82803 BLOOD GASES ANY COMBINATION: CPT

## 2019-07-21 PROCEDURE — 2700000000 HC OXYGEN THERAPY PER DAY

## 2019-07-21 PROCEDURE — P9016 RBC LEUKOCYTES REDUCED: HCPCS

## 2019-07-21 PROCEDURE — 71045 X-RAY EXAM CHEST 1 VIEW: CPT

## 2019-07-21 PROCEDURE — 86900 BLOOD TYPING SEROLOGIC ABO: CPT

## 2019-07-21 PROCEDURE — 85025 COMPLETE CBC W/AUTO DIFF WBC: CPT

## 2019-07-21 PROCEDURE — 94003 VENT MGMT INPAT SUBQ DAY: CPT

## 2019-07-21 PROCEDURE — 94761 N-INVAS EAR/PLS OXIMETRY MLT: CPT

## 2019-07-21 RX ORDER — OXYCODONE HYDROCHLORIDE 5 MG/1
5 TABLET ORAL EVERY 4 HOURS
Status: DISCONTINUED | OUTPATIENT
Start: 2019-07-21 | End: 2019-07-22

## 2019-07-21 RX ORDER — 0.9 % SODIUM CHLORIDE 0.9 %
250 INTRAVENOUS SOLUTION INTRAVENOUS ONCE
Status: DISCONTINUED | OUTPATIENT
Start: 2019-07-21 | End: 2019-07-30

## 2019-07-21 RX ORDER — POTASSIUM CHLORIDE 20MEQ/15ML
40 LIQUID (ML) ORAL ONCE
Status: COMPLETED | OUTPATIENT
Start: 2019-07-21 | End: 2019-07-21

## 2019-07-21 RX ORDER — POTASSIUM CHLORIDE 20MEQ/15ML
40 LIQUID (ML) ORAL DAILY
Status: DISCONTINUED | OUTPATIENT
Start: 2019-07-21 | End: 2019-07-21

## 2019-07-21 RX ADMIN — SODIUM CHLORIDE, PRESERVATIVE FREE 10 ML: 5 INJECTION INTRAVENOUS at 20:21

## 2019-07-21 RX ADMIN — DIAZEPAM 5 MG: 5 TABLET ORAL at 17:18

## 2019-07-21 RX ADMIN — Medication 100 MCG/HR: at 12:59

## 2019-07-21 RX ADMIN — ACETAMINOPHEN 1000 MG: 500 TABLET ORAL at 18:31

## 2019-07-21 RX ADMIN — IBUPROFEN 400 MG: 400 TABLET, FILM COATED ORAL at 07:52

## 2019-07-21 RX ADMIN — Medication 10 ML: at 09:15

## 2019-07-21 RX ADMIN — OXYCODONE HYDROCHLORIDE 5 MG: 5 TABLET ORAL at 17:18

## 2019-07-21 RX ADMIN — THIAMINE HYDROCHLORIDE 500 MG: 100 INJECTION, SOLUTION INTRAMUSCULAR; INTRAVENOUS at 09:32

## 2019-07-21 RX ADMIN — DIAZEPAM 5 MG: 5 TABLET ORAL at 12:16

## 2019-07-21 RX ADMIN — OXYCODONE HYDROCHLORIDE 5 MG: 5 TABLET ORAL at 12:18

## 2019-07-21 RX ADMIN — IBUPROFEN 400 MG: 400 TABLET, FILM COATED ORAL at 03:11

## 2019-07-21 RX ADMIN — CHLORHEXIDINE GLUCONATE 15 ML: 1.2 RINSE ORAL at 20:17

## 2019-07-21 RX ADMIN — GABAPENTIN 300 MG: 300 CAPSULE ORAL at 01:03

## 2019-07-21 RX ADMIN — Medication 100 MCG/HR: at 22:48

## 2019-07-21 RX ADMIN — OXYCODONE HYDROCHLORIDE 5 MG: 5 TABLET ORAL at 20:17

## 2019-07-21 RX ADMIN — ACETAMINOPHEN 1000 MG: 500 TABLET ORAL at 03:12

## 2019-07-21 RX ADMIN — OXYCODONE HYDROCHLORIDE 5 MG: 5 TABLET ORAL at 08:46

## 2019-07-21 RX ADMIN — IBUPROFEN 400 MG: 400 TABLET, FILM COATED ORAL at 00:02

## 2019-07-21 RX ADMIN — SODIUM CHLORIDE, POTASSIUM CHLORIDE, SODIUM LACTATE AND CALCIUM CHLORIDE: 600; 310; 30; 20 INJECTION, SOLUTION INTRAVENOUS at 11:30

## 2019-07-21 RX ADMIN — Medication 10 ML: at 20:21

## 2019-07-21 RX ADMIN — CYCLOBENZAPRINE 5 MG: 10 TABLET, FILM COATED ORAL at 08:48

## 2019-07-21 RX ADMIN — FAMOTIDINE 20 MG: 20 TABLET, FILM COATED ORAL at 08:47

## 2019-07-21 RX ADMIN — GABAPENTIN 300 MG: 300 CAPSULE ORAL at 17:14

## 2019-07-21 RX ADMIN — DOCUSATE SODIUM 100 MG: 100 CAPSULE, LIQUID FILLED ORAL at 07:50

## 2019-07-21 RX ADMIN — FOLIC ACID 1 MG: 1 TABLET ORAL at 07:53

## 2019-07-21 RX ADMIN — DIAZEPAM 5 MG: 5 TABLET ORAL at 00:02

## 2019-07-21 RX ADMIN — POTASSIUM CHLORIDE 40 MEQ: 40 SOLUTION ORAL at 09:14

## 2019-07-21 RX ADMIN — GABAPENTIN 300 MG: 300 CAPSULE ORAL at 08:47

## 2019-07-21 RX ADMIN — Medication 75 MCG/HR: at 00:58

## 2019-07-21 RX ADMIN — CHLORHEXIDINE GLUCONATE 15 ML: 1.2 RINSE ORAL at 09:13

## 2019-07-21 RX ADMIN — SODIUM CHLORIDE, PRESERVATIVE FREE 10 ML: 5 INJECTION INTRAVENOUS at 08:52

## 2019-07-21 RX ADMIN — THERA TABS 1 TABLET: TAB at 07:53

## 2019-07-21 RX ADMIN — DIAZEPAM 5 MG: 5 TABLET ORAL at 06:40

## 2019-07-21 RX ADMIN — ACETAMINOPHEN 1000 MG: 500 TABLET ORAL at 12:13

## 2019-07-21 RX ADMIN — POLYETHYLENE GLYCOL 3350 17 G: 17 POWDER, FOR SOLUTION ORAL at 07:54

## 2019-07-21 RX ADMIN — CYCLOBENZAPRINE 5 MG: 10 TABLET, FILM COATED ORAL at 17:14

## 2019-07-21 RX ADMIN — CYCLOBENZAPRINE 5 MG: 10 TABLET, FILM COATED ORAL at 01:03

## 2019-07-21 RX ADMIN — THIAMINE HYDROCHLORIDE 500 MG: 100 INJECTION, SOLUTION INTRAMUSCULAR; INTRAVENOUS at 01:54

## 2019-07-21 ASSESSMENT — PULMONARY FUNCTION TESTS
PIF_VALUE: 26
PIF_VALUE: 28
PIF_VALUE: 29
PIF_VALUE: 26
PIF_VALUE: 27
PIF_VALUE: 26
PIF_VALUE: 25
PIF_VALUE: 25
PIF_VALUE: 27
PIF_VALUE: 27
PIF_VALUE: 25

## 2019-07-21 ASSESSMENT — PAIN SCALES - GENERAL
PAINLEVEL_OUTOF10: 4
PAINLEVEL_OUTOF10: 0
PAINLEVEL_OUTOF10: 0
PAINLEVEL_OUTOF10: 6
PAINLEVEL_OUTOF10: 0
PAINLEVEL_OUTOF10: 4
PAINLEVEL_OUTOF10: 0
PAINLEVEL_OUTOF10: 0

## 2019-07-21 NOTE — PLAN OF CARE
Problem: Restraint Use - Nonviolent/Non-Self-Destructive Behavior:  Goal: Absence of restraint-related injury  Description  Absence of restraint-related injury  7/21/2019 1915 by Shon Brown RN  Outcome: Met This Shift     Problem: Pain:  Goal: Pain level will decrease  Description  Pain level will decrease   7/21/2019 1915 by Shon Brown RN  Outcome: Ongoing     Problem: Pain:  Goal: Control of acute pain  Description  Control of acute pain   7/21/2019 1915 by Shon Brown RN  Outcome: Ongoing     Problem: Falls - Risk of:  Goal: Absence of physical injury  Description  Absence of physical injury   7/21/2019 1915 by Shon Brown RN  Outcome: Ongoing     Problem: Injury - Risk of, Physical Injury:  Goal: Absence of physical injury  Description  Absence of physical injury   7/21/2019 1915 by Shon Brown RN  Outcome: Ongoing     Problem: Psychomotor Activity - Altered:  Goal: Absence of psychomotor disturbance signs and symptoms  Description  Absence of psychomotor disturbance signs and symptoms  7/21/2019 1915 by Shon Brown RN  Outcome: Ongoing     Problem: OXYGENATION/RESPIRATORY FUNCTION  Goal: Patient will maintain patent airway  7/21/2019 1915 by Shon Brown RN  Outcome: Ongoing     Problem: SKIN INTEGRITY  Goal: Skin integrity is maintained or improved  7/21/2019 1915 by Shon Brown RN  Outcome: Ongoing     Problem: Restraint Use - Nonviolent/Non-Self-Destructive Behavior:  Goal: Absence of restraint indications  Description  Absence of restraint indications  7/21/2019 1915 by Shon Brown RN  Outcome: Not Met This Shift

## 2019-07-21 NOTE — PLAN OF CARE
Problem: Pain:  Goal: Pain level will decrease  Description  Pain level will decrease   Outcome: Ongoing     Problem: Pain:  Goal: Control of acute pain  Description  Control of acute pain   Outcome: Ongoing     Problem: Falls - Risk of:  Goal: Will remain free from falls  Description  Will remain free from falls   Outcome: Ongoing     Problem: Falls - Risk of:  Goal: Absence of physical injury  Description  Absence of physical injury   Outcome: Ongoing     Problem: Injury - Risk of, Physical Injury:  Goal: Will remain free from falls  Description  Will remain free from falls   Outcome: Ongoing     Problem: Injury - Risk of, Physical Injury:  Goal: Absence of physical injury  Description  Absence of physical injury   Outcome: Ongoing     Problem: Mood - Altered:  Goal: Mood stable  Description  Mood stable  Outcome: Ongoing     Problem: OXYGENATION/RESPIRATORY FUNCTION  Goal: Patient will maintain patent airway  7/20/2019 2127 by Jonnathan Zuluaga RN  Outcome: Ongoing

## 2019-07-21 NOTE — PLAN OF CARE
Problem: MECHANICAL VENTILATION  Goal: Patient will maintain patent airway  7/21/2019 1750 by Leon Jimenez RCP  Outcome: Ongoing  7/21/2019 1146 by Beth James RN  Outcome: Ongoing     Problem: MECHANICAL VENTILATION  Goal: ET tube will be managed safely  7/21/2019 1750 by Leon Jimenez RCP  Outcome: Ongoing  7/21/2019 1146 by Beth James RN  Outcome: Ongoing     Problem: SKIN INTEGRITY  Goal: Skin integrity is maintained or improved  7/21/2019 1750 by Leon Jimenez RCP  Outcome: Ongoing  7/21/2019 1146 by Beth James RN  Outcome: Ongoing

## 2019-07-21 NOTE — PLAN OF CARE
PROVIDE ADEQUATE OXYGENATION WITH ACCEPTABLE SP02/ABG'S    ? IDENTIFY APPROPRIATE OXYGEN THERAPY  ? MONITOR SP02/ABG'S AS NEEDED   ? PATIENT EDUCATION AS NEEDED     MECHANICAL VENTILATION     ? PROVIDE OPTIMAL VENTILATION  ? ASSESS FOR EXTUBATION READINESS  ? ASSESS FOR WEANING READINESS  ? EXTUBATE AS TOLERATED  ? IMPLEMENT ADULT MECHANICAL VENTILATION PROTOCOL  ? MAINTAIN ADEQUATE OXYGENATION  ? PERFORM SPONTANEOUS WEANING TRIAL AS TOLERATED   Ventilator Bronchodilator assessment    Breath sounds: decreased R  Inspiratory Pressure: 17  Plateau Pressure: 15    Patient assessed at level 1          ? Bronchodilator Assessment    BRONCHODILATOR ASSESSMENT SCORE  Score 0 (Home) 1 2 3 4   Breath Sounds   ? Chronic Ventilator: Patient at baseline ? Mild Wheezes/ Clear ? Intermittent wheezes with good air entry ? Bilateral/unilateral wheezing with diminished air entry Right ? Insp/Exp wheeze and/or poor aeration   Ventilator Pressures   ? Chronic Ventilator ? Insp. Pressure less than 25 cm H20 ? Insp. Pressure less than 25 cm H20 ? Insp. Pressure exceeds 25 cm H20 ? Insp. Pressure exceeds 30 cm H20   Plateau Pressure ? NA   ? Plateau Pressure less than 4  ? Plateau Pressure less than or equal to 5 ? Plateau Pressure greater than or equal to 6 ?   Plateau Pressure greater than or equal to 8       tahmina chiu  5:51 PM

## 2019-07-21 NOTE — PROGRESS NOTES
ICU PROGRESS NOTE      PATIENT NAME: [facility] RECORD NO. [id number]  DATE: 2019  PRIMARY CARE PHYSICIAN: Pepe Gordon MD    HD: # 6    ASSESSMENT    Patient Active Problem List   Diagnosis    Fall    Closed fracture of multiple ribs of right side    Contusion of right lung    Fracture of transverse process of thoracic vertebra ([facility] [address]    Hemothorax on right    Pneumothorax    Closed fracture of right iliac wing (HCC)    Hypokalemia    Fx dorsal vertebra-closed ([address]    Traumatic closed fracture of distal clavicle with minimal displacement, left, initial encounter       [facility]    1. Neuro  1. Pain control: Fentanyl gtt, flexeril, cherie, tylenol mayra, d/c motrin   2. R T2-T4,6,9 TP fx  1. NS cleared CTLS, HOB 30 degrees, TLSO brace for comfort  3. Sedation - avoid precedex as this cause increased hypotension  4. Moving all extremities spontaneously  5. Acute alcohol withdrawal prior to intubation  1. D/c CIWA  2. Valium q6hrs - can increase as needed  3. Thiamine and folate  4. Did not tolerated Propofol given hypotension overnight - can consider when BP more stable    2. CV  1. Hypovolemia  1. Initially -3L when transferred to ICU  2. IVC w/ complete collapse  3. MAP 58-60, hr 69-80  4. Received 2 fluid boluses and 2uPRBC w/ increase of MAP to 70-50  2. No home cardiac meds  3. Lactic acid 1.1    3. Pulm  1. R 1st-12th rib fractures, R small hemo/pneumo, pulm contusion  1. Decompensated this morning - required BI-PAP and eventual intubation  2. Mech vent:  1. 30%, PRVC RR 14, PEEP 10,   2. AB.438/39.8/79.2   3. CXR showed R hemothorax - R CT placed w/ immediate evacuation of 1L  1. No further drainage overnight  2. Continue to wall suction  3. If no continued drainage and CXR not improved will plan for CT chest     4. Renal  1. BUN:Cr 37:1.91 - improved from Cr 2.8 (likely secondary to dehydration)  2.  Replace electrolytes PRN  3. K 3.4 -

## 2019-07-22 ENCOUNTER — APPOINTMENT (OUTPATIENT)
Dept: GENERAL RADIOLOGY | Age: 59
DRG: 957 | End: 2019-07-22
Payer: COMMERCIAL

## 2019-07-22 ENCOUNTER — APPOINTMENT (OUTPATIENT)
Dept: CT IMAGING | Age: 59
DRG: 957 | End: 2019-07-22
Payer: COMMERCIAL

## 2019-07-22 LAB
ABO/RH: NORMAL
ABSOLUTE EOS #: 0 K/UL (ref 0–0.4)
ABSOLUTE IMMATURE GRANULOCYTE: 0 K/UL (ref 0–0.3)
ABSOLUTE LYMPH #: 1.2 K/UL (ref 1–4.8)
ABSOLUTE MONO #: 1.8 K/UL (ref 0.1–0.8)
ALLEN TEST: ABNORMAL
ANION GAP SERPL CALCULATED.3IONS-SCNC: 11 MMOL/L (ref 9–17)
ANION GAP SERPL CALCULATED.3IONS-SCNC: 14 MMOL/L (ref 9–17)
ANTIBODY SCREEN: NEGATIVE
ARM BAND NUMBER: NORMAL
BASOPHILS # BLD: 0 % (ref 0–2)
BASOPHILS ABSOLUTE: 0 K/UL (ref 0–0.2)
BLD PROD TYP BPU: NORMAL
BLD PROD TYP BPU: NORMAL
BUN BLDV-MCNC: 18 MG/DL (ref 6–20)
BUN BLDV-MCNC: 21 MG/DL (ref 6–20)
BUN/CREAT BLD: ABNORMAL (ref 9–20)
BUN/CREAT BLD: ABNORMAL (ref 9–20)
CALCIUM SERPL-MCNC: 7.3 MG/DL (ref 8.6–10.4)
CALCIUM SERPL-MCNC: 8.3 MG/DL (ref 8.6–10.4)
CHLORIDE BLD-SCNC: 100 MMOL/L (ref 98–107)
CHLORIDE BLD-SCNC: 101 MMOL/L (ref 98–107)
CO2: 24 MMOL/L (ref 20–31)
CO2: 26 MMOL/L (ref 20–31)
CREAT SERPL-MCNC: 1.1 MG/DL (ref 0.7–1.2)
CREAT SERPL-MCNC: 1.19 MG/DL (ref 0.7–1.2)
CROSSMATCH RESULT: NORMAL
CROSSMATCH RESULT: NORMAL
DIFFERENTIAL TYPE: ABNORMAL
DISPENSE STATUS BLOOD BANK: NORMAL
DISPENSE STATUS BLOOD BANK: NORMAL
EKG ATRIAL RATE: 123 BPM
EKG P AXIS: 69 DEGREES
EKG P-R INTERVAL: 178 MS
EKG Q-T INTERVAL: 300 MS
EKG QRS DURATION: 92 MS
EKG QTC CALCULATION (BAZETT): 429 MS
EKG R AXIS: 30 DEGREES
EKG T AXIS: 39 DEGREES
EKG VENTRICULAR RATE: 123 BPM
EOSINOPHILS RELATIVE PERCENT: 0 % (ref 1–4)
EXPIRATION DATE: NORMAL
FIO2: 30
GFR AFRICAN AMERICAN: >60 ML/MIN
GFR AFRICAN AMERICAN: >60 ML/MIN
GFR NON-AFRICAN AMERICAN: >60 ML/MIN
GFR NON-AFRICAN AMERICAN: >60 ML/MIN
GFR SERPL CREATININE-BSD FRML MDRD: ABNORMAL ML/MIN/{1.73_M2}
GLUCOSE BLD-MCNC: 100 MG/DL (ref 70–99)
GLUCOSE BLD-MCNC: 156 MG/DL (ref 70–99)
HCT VFR BLD CALC: 27.5 % (ref 40.7–50.3)
HEMOGLOBIN: 9.1 G/DL (ref 13–17)
IMMATURE GRANULOCYTES: 0 %
LYMPHOCYTES # BLD: 8 % (ref 24–44)
MAGNESIUM: 1.7 MG/DL (ref 1.6–2.6)
MCH RBC QN AUTO: 30.4 PG (ref 25.2–33.5)
MCHC RBC AUTO-ENTMCNC: 33.1 G/DL (ref 28.4–34.8)
MCV RBC AUTO: 92 FL (ref 82.6–102.9)
MODE: ABNORMAL
MONOCYTES # BLD: 12 % (ref 1–7)
MORPHOLOGY: ABNORMAL
NEGATIVE BASE EXCESS, ART: ABNORMAL (ref 0–2)
NRBC AUTOMATED: 0.1 PER 100 WBC
O2 DEVICE/FLOW/%: ABNORMAL
PATIENT TEMP: 39.4
PDW BLD-RTO: 13.5 % (ref 11.8–14.4)
PHOSPHORUS: 2.9 MG/DL (ref 2.5–4.5)
PLATELET # BLD: 311 K/UL (ref 138–453)
PLATELET ESTIMATE: ABNORMAL
PMV BLD AUTO: 8.8 FL (ref 8.1–13.5)
POC HCO3: 28.1 MMOL/L (ref 21–28)
POC LACTIC ACID: 0.6 MMOL/L (ref 0.56–1.39)
POC O2 SATURATION: 93 % (ref 94–98)
POC PCO2 TEMP: 42 MM HG
POC PCO2: 37.4 MM HG (ref 35–48)
POC PH TEMP: 7.45
POC PH: 7.48 (ref 7.35–7.45)
POC PO2 TEMP: 72 MM HG
POC PO2: 60.9 MM HG (ref 83–108)
POSITIVE BASE EXCESS, ART: 4 (ref 0–3)
POTASSIUM SERPL-SCNC: 3.6 MMOL/L (ref 3.7–5.3)
POTASSIUM SERPL-SCNC: 3.7 MMOL/L (ref 3.7–5.3)
RBC # BLD: 2.99 M/UL (ref 4.21–5.77)
RBC # BLD: ABNORMAL 10*6/UL
SAMPLE SITE: ABNORMAL
SEG NEUTROPHILS: 80 % (ref 36–66)
SEGMENTED NEUTROPHILS ABSOLUTE COUNT: 12 K/UL (ref 1.8–7.7)
SODIUM BLD-SCNC: 137 MMOL/L (ref 135–144)
SODIUM BLD-SCNC: 139 MMOL/L (ref 135–144)
TCO2 (CALC), ART: 29 MMOL/L (ref 22–29)
TRANSFUSION STATUS: NORMAL
TRANSFUSION STATUS: NORMAL
UNIT DIVISION: 0
UNIT DIVISION: 0
UNIT NUMBER: NORMAL
UNIT NUMBER: NORMAL
WBC # BLD: 15 K/UL (ref 3.5–11.3)
WBC # BLD: ABNORMAL 10*3/UL

## 2019-07-22 PROCEDURE — 6370000000 HC RX 637 (ALT 250 FOR IP): Performed by: STUDENT IN AN ORGANIZED HEALTH CARE EDUCATION/TRAINING PROGRAM

## 2019-07-22 PROCEDURE — 6360000002 HC RX W HCPCS: Performed by: NURSE PRACTITIONER

## 2019-07-22 PROCEDURE — 85025 COMPLETE CBC W/AUTO DIFF WBC: CPT

## 2019-07-22 PROCEDURE — 83605 ASSAY OF LACTIC ACID: CPT

## 2019-07-22 PROCEDURE — 6370000000 HC RX 637 (ALT 250 FOR IP): Performed by: SURGERY

## 2019-07-22 PROCEDURE — 83735 ASSAY OF MAGNESIUM: CPT

## 2019-07-22 PROCEDURE — 80048 BASIC METABOLIC PNL TOTAL CA: CPT

## 2019-07-22 PROCEDURE — 2580000003 HC RX 258: Performed by: STUDENT IN AN ORGANIZED HEALTH CARE EDUCATION/TRAINING PROGRAM

## 2019-07-22 PROCEDURE — 82803 BLOOD GASES ANY COMBINATION: CPT

## 2019-07-22 PROCEDURE — 94003 VENT MGMT INPAT SUBQ DAY: CPT

## 2019-07-22 PROCEDURE — 6370000000 HC RX 637 (ALT 250 FOR IP): Performed by: NURSE PRACTITIONER

## 2019-07-22 PROCEDURE — 6360000002 HC RX W HCPCS: Performed by: STUDENT IN AN ORGANIZED HEALTH CARE EDUCATION/TRAINING PROGRAM

## 2019-07-22 PROCEDURE — 2580000003 HC RX 258: Performed by: NURSE PRACTITIONER

## 2019-07-22 PROCEDURE — 2500000003 HC RX 250 WO HCPCS: Performed by: NURSE PRACTITIONER

## 2019-07-22 PROCEDURE — 2500000003 HC RX 250 WO HCPCS: Performed by: STUDENT IN AN ORGANIZED HEALTH CARE EDUCATION/TRAINING PROGRAM

## 2019-07-22 PROCEDURE — 2700000000 HC OXYGEN THERAPY PER DAY

## 2019-07-22 PROCEDURE — 2000000000 HC ICU R&B

## 2019-07-22 PROCEDURE — 37799 UNLISTED PX VASCULAR SURGERY: CPT

## 2019-07-22 PROCEDURE — 71250 CT THORAX DX C-: CPT

## 2019-07-22 PROCEDURE — 71045 X-RAY EXAM CHEST 1 VIEW: CPT

## 2019-07-22 PROCEDURE — 93010 ELECTROCARDIOGRAM REPORT: CPT | Performed by: INTERNAL MEDICINE

## 2019-07-22 PROCEDURE — 94762 N-INVAS EAR/PLS OXIMTRY CONT: CPT

## 2019-07-22 PROCEDURE — 94681 O2 UPTK CO2 OUTP % O2 XTRC: CPT

## 2019-07-22 PROCEDURE — 84100 ASSAY OF PHOSPHORUS: CPT

## 2019-07-22 RX ORDER — MAGNESIUM SULFATE 1 G/100ML
1 INJECTION INTRAVENOUS ONCE
Status: DISCONTINUED | OUTPATIENT
Start: 2019-07-22 | End: 2019-07-22

## 2019-07-22 RX ORDER — QUETIAPINE FUMARATE 100 MG/1
100 TABLET, FILM COATED ORAL 2 TIMES DAILY
Status: DISCONTINUED | OUTPATIENT
Start: 2019-07-23 | End: 2019-08-01

## 2019-07-22 RX ORDER — HALOPERIDOL 5 MG/ML
5 INJECTION INTRAMUSCULAR ONCE
Status: DISCONTINUED | OUTPATIENT
Start: 2019-07-22 | End: 2019-07-22

## 2019-07-22 RX ORDER — SODIUM CHLORIDE, SODIUM LACTATE, POTASSIUM CHLORIDE, CALCIUM CHLORIDE 600; 310; 30; 20 MG/100ML; MG/100ML; MG/100ML; MG/100ML
INJECTION, SOLUTION INTRAVENOUS CONTINUOUS
Status: DISCONTINUED | OUTPATIENT
Start: 2019-07-23 | End: 2019-07-23

## 2019-07-22 RX ORDER — MIDAZOLAM HYDROCHLORIDE 1 MG/ML
INJECTION INTRAMUSCULAR; INTRAVENOUS
Status: DISPENSED
Start: 2019-07-22 | End: 2019-07-22

## 2019-07-22 RX ORDER — FENTANYL CITRATE 50 UG/ML
100 INJECTION, SOLUTION INTRAMUSCULAR; INTRAVENOUS
Status: DISCONTINUED | OUTPATIENT
Start: 2019-07-22 | End: 2019-08-01

## 2019-07-22 RX ORDER — LABETALOL HYDROCHLORIDE 5 MG/ML
10 INJECTION, SOLUTION INTRAVENOUS ONCE
Status: COMPLETED | OUTPATIENT
Start: 2019-07-22 | End: 2019-07-22

## 2019-07-22 RX ORDER — OXYCODONE HYDROCHLORIDE 5 MG/1
10 TABLET ORAL EVERY 4 HOURS
Status: DISCONTINUED | OUTPATIENT
Start: 2019-07-22 | End: 2019-07-27

## 2019-07-22 RX ORDER — PROPRANOLOL HYDROCHLORIDE 10 MG/1
10 TABLET ORAL EVERY 8 HOURS
Status: DISCONTINUED | OUTPATIENT
Start: 2019-07-22 | End: 2019-07-30

## 2019-07-22 RX ORDER — MIDAZOLAM HYDROCHLORIDE 1 MG/ML
2 INJECTION INTRAMUSCULAR; INTRAVENOUS ONCE
Status: COMPLETED | OUTPATIENT
Start: 2019-07-22 | End: 2019-07-22

## 2019-07-22 RX ORDER — GABAPENTIN 300 MG/1
600 CAPSULE ORAL EVERY 8 HOURS
Status: DISCONTINUED | OUTPATIENT
Start: 2019-07-22 | End: 2019-07-25

## 2019-07-22 RX ORDER — THIAMINE MONONITRATE (VIT B1) 100 MG
100 TABLET ORAL 3 TIMES DAILY
Status: DISCONTINUED | OUTPATIENT
Start: 2019-07-22 | End: 2019-08-15 | Stop reason: HOSPADM

## 2019-07-22 RX ORDER — MAGNESIUM OXIDE 400 MG/1
800 TABLET ORAL EVERY 6 HOURS
Status: DISCONTINUED | OUTPATIENT
Start: 2019-07-22 | End: 2019-07-22

## 2019-07-22 RX ORDER — CLONIDINE HYDROCHLORIDE 0.2 MG/1
0.2 TABLET ORAL EVERY 8 HOURS
Status: DISCONTINUED | OUTPATIENT
Start: 2019-07-22 | End: 2019-07-27

## 2019-07-22 RX ORDER — POTASSIUM CHLORIDE 7.45 MG/ML
20 INJECTION INTRAVENOUS ONCE
Status: DISCONTINUED | OUTPATIENT
Start: 2019-07-22 | End: 2019-07-22

## 2019-07-22 RX ORDER — LABETALOL HYDROCHLORIDE 5 MG/ML
10 INJECTION, SOLUTION INTRAVENOUS EVERY 6 HOURS
Status: DISCONTINUED | OUTPATIENT
Start: 2019-07-22 | End: 2019-07-22

## 2019-07-22 RX ORDER — DIAZEPAM 5 MG/1
10 TABLET ORAL EVERY 6 HOURS
Status: DISCONTINUED | OUTPATIENT
Start: 2019-07-22 | End: 2019-07-27

## 2019-07-22 RX ORDER — METOCLOPRAMIDE 10 MG/1
10 TABLET ORAL EVERY 6 HOURS
Status: DISCONTINUED | OUTPATIENT
Start: 2019-07-22 | End: 2019-07-25

## 2019-07-22 RX ADMIN — SODIUM CHLORIDE, PRESERVATIVE FREE 10 ML: 5 INJECTION INTRAVENOUS at 20:05

## 2019-07-22 RX ADMIN — OXYCODONE HYDROCHLORIDE 5 MG: 5 TABLET ORAL at 08:34

## 2019-07-22 RX ADMIN — GABAPENTIN 300 MG: 300 CAPSULE ORAL at 00:03

## 2019-07-22 RX ADMIN — SODIUM CHLORIDE, POTASSIUM CHLORIDE, SODIUM LACTATE AND CALCIUM CHLORIDE: 600; 310; 30; 20 INJECTION, SOLUTION INTRAVENOUS at 04:27

## 2019-07-22 RX ADMIN — CLONIDINE HYDROCHLORIDE 0.2 MG: 0.2 TABLET ORAL at 11:24

## 2019-07-22 RX ADMIN — MAGNESIUM GLUCONATE 500 MG ORAL TABLET 800 MG: 500 TABLET ORAL at 20:04

## 2019-07-22 RX ADMIN — GABAPENTIN 300 MG: 300 CAPSULE ORAL at 08:44

## 2019-07-22 RX ADMIN — Medication 150 MCG/HR: at 05:38

## 2019-07-22 RX ADMIN — POTASSIUM BICARBONATE 20 MEQ: 782 TABLET, EFFERVESCENT ORAL at 08:54

## 2019-07-22 RX ADMIN — CLONIDINE HYDROCHLORIDE 0.2 MG: 0.2 TABLET ORAL at 18:12

## 2019-07-22 RX ADMIN — DIAZEPAM 10 MG: 5 TABLET ORAL at 11:23

## 2019-07-22 RX ADMIN — MIDAZOLAM HYDROCHLORIDE 2 MG: 1 INJECTION, SOLUTION INTRAMUSCULAR; INTRAVENOUS at 09:23

## 2019-07-22 RX ADMIN — POTASSIUM BICARBONATE 20 MEQ: 782 TABLET, EFFERVESCENT ORAL at 17:02

## 2019-07-22 RX ADMIN — Medication 10 MG: at 09:41

## 2019-07-22 RX ADMIN — DEXMEDETOMIDINE HYDROCHLORIDE 0.8 MCG/KG/HR: 100 INJECTION, SOLUTION INTRAVENOUS at 21:49

## 2019-07-22 RX ADMIN — DEXTROSE AND SODIUM CHLORIDE: 5; 450 INJECTION, SOLUTION INTRAVENOUS at 20:08

## 2019-07-22 RX ADMIN — CYCLOBENZAPRINE 5 MG: 10 TABLET, FILM COATED ORAL at 00:03

## 2019-07-22 RX ADMIN — OXYCODONE HYDROCHLORIDE 5 MG: 5 TABLET ORAL at 04:35

## 2019-07-22 RX ADMIN — Medication 10 MG: at 01:45

## 2019-07-22 RX ADMIN — METOCLOPRAMIDE 10 MG: 10 TABLET ORAL at 18:16

## 2019-07-22 RX ADMIN — MAGNESIUM GLUCONATE 500 MG ORAL TABLET 800 MG: 500 TABLET ORAL at 13:57

## 2019-07-22 RX ADMIN — FAMOTIDINE 20 MG: 20 TABLET, FILM COATED ORAL at 08:39

## 2019-07-22 RX ADMIN — ACETAMINOPHEN 1000 MG: 500 TABLET ORAL at 02:59

## 2019-07-22 RX ADMIN — Medication 10 ML: at 09:01

## 2019-07-22 RX ADMIN — POLYETHYLENE GLYCOL 3350 17 G: 17 POWDER, FOR SOLUTION ORAL at 09:01

## 2019-07-22 RX ADMIN — THERA TABS 1 TABLET: TAB at 08:44

## 2019-07-22 RX ADMIN — DIBASIC SODIUM PHOSPHATE, MONOBASIC POTASSIUM PHOSPHATE AND MONOBASIC SODIUM PHOSPHATE 250 MG: 852; 155; 130 TABLET ORAL at 12:55

## 2019-07-22 RX ADMIN — ENOXAPARIN SODIUM 30 MG: 100 INJECTION SUBCUTANEOUS at 08:45

## 2019-07-22 RX ADMIN — FAMOTIDINE 20 MG: 20 TABLET, FILM COATED ORAL at 20:06

## 2019-07-22 RX ADMIN — DEXMEDETOMIDINE HYDROCHLORIDE 1 MCG/KG/HR: 100 INJECTION, SOLUTION INTRAVENOUS at 11:24

## 2019-07-22 RX ADMIN — OXYCODONE HYDROCHLORIDE 10 MG: 5 TABLET ORAL at 12:55

## 2019-07-22 RX ADMIN — DIAZEPAM 5 MG: 5 TABLET ORAL at 05:53

## 2019-07-22 RX ADMIN — ACETAMINOPHEN 1000 MG: 500 TABLET ORAL at 18:12

## 2019-07-22 RX ADMIN — PROPRANOLOL HYDROCHLORIDE 10 MG: 10 TABLET ORAL at 18:16

## 2019-07-22 RX ADMIN — FENTANYL CITRATE 100 MCG: 50 INJECTION, SOLUTION INTRAMUSCULAR; INTRAVENOUS at 09:42

## 2019-07-22 RX ADMIN — Medication 100 MCG/HR: at 21:06

## 2019-07-22 RX ADMIN — Medication 100 MG: at 13:59

## 2019-07-22 RX ADMIN — Medication 100 MG: at 08:45

## 2019-07-22 RX ADMIN — OXYCODONE HYDROCHLORIDE 10 MG: 5 TABLET ORAL at 20:03

## 2019-07-22 RX ADMIN — CYCLOBENZAPRINE 5 MG: 10 TABLET, FILM COATED ORAL at 08:39

## 2019-07-22 RX ADMIN — CYCLOBENZAPRINE 5 MG: 10 TABLET, FILM COATED ORAL at 17:02

## 2019-07-22 RX ADMIN — Medication 100 MG: at 20:06

## 2019-07-22 RX ADMIN — ACETAMINOPHEN 1000 MG: 500 TABLET ORAL at 11:23

## 2019-07-22 RX ADMIN — GABAPENTIN 600 MG: 300 CAPSULE ORAL at 17:02

## 2019-07-22 RX ADMIN — FOLIC ACID 1 MG: 1 TABLET ORAL at 08:44

## 2019-07-22 RX ADMIN — DEXTROSE AND SODIUM CHLORIDE: 5; 450 INJECTION, SOLUTION INTRAVENOUS at 10:19

## 2019-07-22 RX ADMIN — Medication 10 MG: at 00:52

## 2019-07-22 RX ADMIN — DIAZEPAM 10 MG: 5 TABLET ORAL at 17:03

## 2019-07-22 RX ADMIN — MAGNESIUM GLUCONATE 500 MG ORAL TABLET 800 MG: 500 TABLET ORAL at 08:51

## 2019-07-22 RX ADMIN — Medication 200 MCG/HR: at 10:25

## 2019-07-22 RX ADMIN — METOCLOPRAMIDE 10 MG: 10 TABLET ORAL at 13:55

## 2019-07-22 RX ADMIN — ENOXAPARIN SODIUM 30 MG: 100 INJECTION SUBCUTANEOUS at 20:26

## 2019-07-22 RX ADMIN — DOCUSATE SODIUM 100 MG: 100 CAPSULE, LIQUID FILLED ORAL at 08:45

## 2019-07-22 RX ADMIN — DIAZEPAM 5 MG: 5 TABLET ORAL at 00:03

## 2019-07-22 RX ADMIN — DIBASIC SODIUM PHOSPHATE, MONOBASIC POTASSIUM PHOSPHATE AND MONOBASIC SODIUM PHOSPHATE 250 MG: 852; 155; 130 TABLET ORAL at 08:52

## 2019-07-22 RX ADMIN — OXYCODONE HYDROCHLORIDE 10 MG: 5 TABLET ORAL at 17:03

## 2019-07-22 RX ADMIN — SODIUM CHLORIDE, PRESERVATIVE FREE 10 ML: 5 INJECTION INTRAVENOUS at 09:00

## 2019-07-22 RX ADMIN — OXYCODONE HYDROCHLORIDE 5 MG: 5 TABLET ORAL at 00:03

## 2019-07-22 ASSESSMENT — PAIN SCALES - GENERAL
PAINLEVEL_OUTOF10: 5
PAINLEVEL_OUTOF10: 0

## 2019-07-22 ASSESSMENT — PULMONARY FUNCTION TESTS
PIF_VALUE: 31
PIF_VALUE: 34
PIF_VALUE: 28
PIF_VALUE: 30
PIF_VALUE: 31
PIF_VALUE: 27
PIF_VALUE: 31

## 2019-07-22 NOTE — PROGRESS NOTES
ICU PROGRESS NOTE        PATIENT NAME: Jaida Alonso [facility] RECORD NO. [id number]  DATE: 2019    PRIMARY CARE PHYSICIAN: Kam Chase MD    HD: # 7    ASSESSMENT    Patient Active Problem List   Diagnosis    Fall    Closed fracture of multiple ribs of right side    Contusion of right lung    Fracture of transverse process of thoracic vertebra ([facility] [address]    Hemothorax on right    Pneumothorax    Closed fracture of right iliac wing (HCC)    Hypokalemia    Fx dorsal vertebra-closed ([facility] [address]    Traumatic closed fracture of distal clavicle with minimal displacement, left, initial encounter       MEDICAL DECISION MAKING AND PLAN     1. Neuro  1. Pain control: Fentanyl gtt, flexeril, cherie, tylenol mayra, d/c motrin   1. Inc. Cherie 600mg Q8H  2. R T2-T4,6,9 TP fx  1. NS cleared CTLS, HOB 30 degrees, TLSO brace for comfort  3. Acute alcohol withdrawal prior to intubation  1. Thiamine & Folate  2. Inc Valium 10mg Q6H  2. CV  1. Propranolol 10mg Q8H  2. Clonidine 0.2mg  3. Labetalol 10mg Q6H PRN  4. MAP:   5. HR:   6. SBP: 145-209  7. Lactate: 0.6  8. Hypovolemia  1. Received 2 fluid boluses and 2uPRBC w/ increase of MAP to 70-50  9. Lactic acid 0.6     3. Pulm  1. R 1st-12th rib fractures, R small hemo/pneumo, pulm contusion  1. Decompensated this morning - required BI-PAP and eventual intubation  2. Mech vent:  1. 30%, PRVC RR 14, PEEP 10,   1. Will increase PEEP to 14.  2. AB.48/42/60.9/28/93%  3. PF ratio: 203 (264)  4. Repeat CT chest: R apical PTX, R hemothorax (loculated)  5. Repeat CXR: worsening R pleural effusion  3. Obtain 3D recont CT chest  4. Plan for rib plating/Thoracotomy, Call cryo rep for      4. Renal  1. BUN:Cr    21/1.19  2. Replace electrolytes PRN  3. K 3.7 - replaced  4. Mg 1.7 - replaced  5. D5 1/2 NS until at TF goal x 24 hours  6. Recheck CBC/BMP/LFT/Ammonia/CXR, ABG w Lac/iCa tomorrow AM  7. UOP: 1.3 cc/kg/hr  8. Strict I/O  9. Woodall in place     5. Heme  1.  S/p

## 2019-07-22 NOTE — PLAN OF CARE
Problem: Pain:  Goal: Pain level will decrease  Description  Pain level will decrease   Outcome: Ongoing  Goal: Control of acute pain  Description  Control of acute pain   Outcome: Ongoing  Goal: Control of chronic pain  Description  Control of chronic pain   Outcome: Ongoing     Problem: Falls - Risk of:  Goal: Will remain free from falls  Description  Will remain free from falls   Outcome: Ongoing  Goal: Absence of physical injury  Description  Absence of physical injury   Outcome: Ongoing     Problem: Nutrition  Goal: Optimal nutrition therapy  Outcome: Ongoing     Problem: Confusion - Acute:  Goal: Absence of continued neurological deterioration signs and symptoms  Description  Absence of continued neurological deterioration signs and symptoms  Outcome: Ongoing  Goal: Mental status will be restored to baseline  Description  Mental status will be restored to baseline  Outcome: Ongoing     Problem: Discharge Planning:  Goal: Ability to perform activities of daily living will improve  Description  Ability to perform activities of daily living will improve  Outcome: Ongoing  Goal: Participates in care planning  Description  Participates in care planning  Outcome: Ongoing     Problem: Injury - Risk of, Physical Injury:  Goal: Will remain free from falls  Description  Will remain free from falls   Outcome: Ongoing  Goal: Absence of physical injury  Description  Absence of physical injury   Outcome: Ongoing     Problem: Restraint Use - Nonviolent/Non-Self-Destructive Behavior  Goal: Absence of restraint-related injury  Outcome: Ongoing  Goal: Absence of restraint indications  Outcome: Ongoing     Problem: SKIN INTEGRITY  Goal: Skin integrity is maintained or improved  Outcome: Ongoing     Problem: MECHANICAL VENTILATION  Goal: Patient will maintain patent airway  Outcome: Ongoing  Goal: Oral health is maintained or improved  Outcome: Ongoing  Goal: ET tube will be managed safely  Outcome: Ongoing     Problem: Sensory Perception - Impaired:  Goal: Demonstrations of improved sensory functioning will increase  Description  Demonstrations of improved sensory functioning will increase  Outcome: Ongoing  Goal: Decrease in sensory misperception frequency  Description  Decrease in sensory misperception frequency  Outcome: Ongoing  Goal: Able to refrain from responding to false sensory perceptions  Description  Able to refrain from responding to false sensory perceptions  Outcome: Ongoing  Goal: Demonstrates accurate environmental perceptions  Description  Demonstrates accurate environmental perceptions  Outcome: Ongoing  Goal: Able to distinguish between reality-based and nonreality-based thinking  Description  Able to distinguish between reality-based and nonreality-based thinking  Outcome: Ongoing  Goal: Able to interrupt nonreality-based thinking  Description  Able to interrupt nonreality-based thinking  Outcome: Ongoing     Problem: Mood - Altered:  Goal: Mood stable  Description  Mood stable  Outcome: Ongoing  Goal: Absence of abusive behavior  Description  Absence of abusive behavior  Outcome: Ongoing  Goal: Verbalizations of feeling emotionally comfortable while being cared for will increase  Description  Verbalizations of feeling emotionally comfortable while being cared for will increase  Outcome: Ongoing     Problem: Injury - Risk of, Physical Injury:  Goal: Will remain free from falls  Description  Will remain free from falls   Outcome: Ongoing  Goal: Absence of physical injury  Description  Absence of physical injury   Outcome: Ongoing

## 2019-07-22 NOTE — CARE COORDINATION
Dontrell Jarquin from [facility] is going to hold the insurance precert for now, until patient is extubated and medically stable.   [phone]

## 2019-07-23 ENCOUNTER — APPOINTMENT (OUTPATIENT)
Dept: CT IMAGING | Age: 59
DRG: 957 | End: 2019-07-23
Payer: COMMERCIAL

## 2019-07-23 ENCOUNTER — APPOINTMENT (OUTPATIENT)
Dept: GENERAL RADIOLOGY | Age: 59
DRG: 957 | End: 2019-07-23
Payer: COMMERCIAL

## 2019-07-23 ENCOUNTER — ANESTHESIA EVENT (OUTPATIENT)
Dept: OPERATING ROOM | Age: 59
DRG: 957 | End: 2019-07-23
Payer: COMMERCIAL

## 2019-07-23 LAB
ALBUMIN SERPL-MCNC: 2.2 G/DL (ref 3.5–5.2)
ALBUMIN/GLOBULIN RATIO: 0.7 (ref 1–2.5)
ALLEN TEST: ABNORMAL
ALP BLD-CCNC: 192 U/L (ref 40–129)
ALT SERPL-CCNC: 19 U/L (ref 5–41)
AMMONIA: 38 UMOL/L (ref 16–60)
ANION GAP SERPL CALCULATED.3IONS-SCNC: 13 MMOL/L (ref 9–17)
AST SERPL-CCNC: 40 U/L
BILIRUB SERPL-MCNC: 1.95 MG/DL (ref 0.3–1.2)
BILIRUBIN DIRECT: 1.5 MG/DL
BILIRUBIN, INDIRECT: 0.45 MG/DL (ref 0–1)
BUN BLDV-MCNC: 18 MG/DL (ref 6–20)
BUN/CREAT BLD: ABNORMAL (ref 9–20)
CALCIUM SERPL-MCNC: 7.7 MG/DL (ref 8.6–10.4)
CHLORIDE BLD-SCNC: 100 MMOL/L (ref 98–107)
CO2: 26 MMOL/L (ref 20–31)
CREAT SERPL-MCNC: 1.09 MG/DL (ref 0.7–1.2)
FIO2: 30
GFR AFRICAN AMERICAN: >60 ML/MIN
GFR NON-AFRICAN AMERICAN: >60 ML/MIN
GFR SERPL CREATININE-BSD FRML MDRD: ABNORMAL ML/MIN/{1.73_M2}
GFR SERPL CREATININE-BSD FRML MDRD: ABNORMAL ML/MIN/{1.73_M2}
GLOBULIN: ABNORMAL G/DL (ref 1.5–3.8)
GLUCOSE BLD-MCNC: 130 MG/DL (ref 70–99)
HCT VFR BLD CALC: 24 % (ref 40.7–50.3)
HEMOGLOBIN: 7.8 G/DL (ref 13–17)
MCH RBC QN AUTO: 30.6 PG (ref 25.2–33.5)
MCHC RBC AUTO-ENTMCNC: 32.5 G/DL (ref 28.4–34.8)
MCV RBC AUTO: 94.1 FL (ref 82.6–102.9)
MODE: ABNORMAL
NEGATIVE BASE EXCESS, ART: ABNORMAL (ref 0–2)
NRBC AUTOMATED: 0.2 PER 100 WBC
O2 DEVICE/FLOW/%: ABNORMAL
PATIENT TEMP: ABNORMAL
PDW BLD-RTO: 13.6 % (ref 11.8–14.4)
PLATELET # BLD: 313 K/UL (ref 138–453)
PMV BLD AUTO: 9.4 FL (ref 8.1–13.5)
POC CHLORIDE: 101 MMOL/L (ref 98–107)
POC HCO3: 28.3 MMOL/L (ref 21–28)
POC HCO3: 31.1 MMOL/L (ref 21–28)
POC HCO3: 31.4 MMOL/L (ref 21–28)
POC IONIZED CALCIUM: 1.12 MMOL/L (ref 1.15–1.33)
POC LACTIC ACID: 0.69 MMOL/L (ref 0.56–1.39)
POC LACTIC ACID: 0.87 MMOL/L (ref 0.56–1.39)
POC LACTIC ACID: 1.02 MMOL/L (ref 0.56–1.39)
POC LACTIC ACID: 1.04 MMOL/L (ref 0.56–1.39)
POC O2 SATURATION: 96 % (ref 94–98)
POC O2 SATURATION: 97 % (ref 94–98)
POC O2 SATURATION: 97 % (ref 94–98)
POC PCO2 TEMP: ABNORMAL MM HG
POC PCO2: 37.3 MM HG (ref 35–48)
POC PCO2: 42.8 MM HG (ref 35–48)
POC PCO2: 44.6 MM HG (ref 35–48)
POC PH TEMP: ABNORMAL
POC PH: 7.45 (ref 7.35–7.45)
POC PH: 7.47 (ref 7.35–7.45)
POC PH: 7.49 (ref 7.35–7.45)
POC PO2 TEMP: ABNORMAL MM HG
POC PO2: 72.8 MM HG (ref 83–108)
POC PO2: 85.7 MM HG (ref 83–108)
POC PO2: 87.4 MM HG (ref 83–108)
POC POTASSIUM: 3.5 MMOL/L (ref 3.5–4.5)
POC SODIUM: 137 MMOL/L (ref 138–146)
POSITIVE BASE EXCESS, ART: 5 (ref 0–3)
POSITIVE BASE EXCESS, ART: 6 (ref 0–3)
POSITIVE BASE EXCESS, ART: 7 (ref 0–3)
POTASSIUM SERPL-SCNC: 3.8 MMOL/L (ref 3.7–5.3)
RBC # BLD: 2.55 M/UL (ref 4.21–5.77)
SAMPLE SITE: ABNORMAL
SODIUM BLD-SCNC: 139 MMOL/L (ref 135–144)
TCO2 (CALC), ART: 30 MMOL/L (ref 22–29)
TCO2 (CALC), ART: 33 MMOL/L (ref 22–29)
TCO2 (CALC), ART: 33 MMOL/L (ref 22–29)
TOTAL PROTEIN: 5.3 G/DL (ref 6.4–8.3)
WBC # BLD: 13.5 K/UL (ref 3.5–11.3)

## 2019-07-23 PROCEDURE — 6360000002 HC RX W HCPCS: Performed by: STUDENT IN AN ORGANIZED HEALTH CARE EDUCATION/TRAINING PROGRAM

## 2019-07-23 PROCEDURE — 37799 UNLISTED PX VASCULAR SURGERY: CPT

## 2019-07-23 PROCEDURE — 6370000000 HC RX 637 (ALT 250 FOR IP): Performed by: STUDENT IN AN ORGANIZED HEALTH CARE EDUCATION/TRAINING PROGRAM

## 2019-07-23 PROCEDURE — 2580000003 HC RX 258: Performed by: NURSE PRACTITIONER

## 2019-07-23 PROCEDURE — 71045 X-RAY EXAM CHEST 1 VIEW: CPT

## 2019-07-23 PROCEDURE — 82435 ASSAY OF BLOOD CHLORIDE: CPT

## 2019-07-23 PROCEDURE — 83605 ASSAY OF LACTIC ACID: CPT

## 2019-07-23 PROCEDURE — 80048 BASIC METABOLIC PNL TOTAL CA: CPT

## 2019-07-23 PROCEDURE — 94003 VENT MGMT INPAT SUBQ DAY: CPT

## 2019-07-23 PROCEDURE — 94770 HC ETCO2 MONITOR DAILY: CPT

## 2019-07-23 PROCEDURE — 85027 COMPLETE CBC AUTOMATED: CPT

## 2019-07-23 PROCEDURE — 82803 BLOOD GASES ANY COMBINATION: CPT

## 2019-07-23 PROCEDURE — 80076 HEPATIC FUNCTION PANEL: CPT

## 2019-07-23 PROCEDURE — 2580000003 HC RX 258: Performed by: STUDENT IN AN ORGANIZED HEALTH CARE EDUCATION/TRAINING PROGRAM

## 2019-07-23 PROCEDURE — 84132 ASSAY OF SERUM POTASSIUM: CPT

## 2019-07-23 PROCEDURE — 6360000002 HC RX W HCPCS: Performed by: NURSE PRACTITIONER

## 2019-07-23 PROCEDURE — 6370000000 HC RX 637 (ALT 250 FOR IP): Performed by: NURSE PRACTITIONER

## 2019-07-23 PROCEDURE — 94762 N-INVAS EAR/PLS OXIMTRY CONT: CPT

## 2019-07-23 PROCEDURE — 6370000000 HC RX 637 (ALT 250 FOR IP): Performed by: SURGERY

## 2019-07-23 PROCEDURE — 82140 ASSAY OF AMMONIA: CPT

## 2019-07-23 PROCEDURE — 82330 ASSAY OF CALCIUM: CPT

## 2019-07-23 PROCEDURE — 2700000000 HC OXYGEN THERAPY PER DAY

## 2019-07-23 PROCEDURE — 2000000000 HC ICU R&B

## 2019-07-23 PROCEDURE — 2500000003 HC RX 250 WO HCPCS: Performed by: NURSE PRACTITIONER

## 2019-07-23 PROCEDURE — 84295 ASSAY OF SERUM SODIUM: CPT

## 2019-07-23 PROCEDURE — 76376 3D RENDER W/INTRP POSTPROCES: CPT

## 2019-07-23 RX ORDER — ACETAMINOPHEN 325 MG/1
650 TABLET ORAL ONCE
Status: COMPLETED | OUTPATIENT
Start: 2019-07-23 | End: 2019-07-23

## 2019-07-23 RX ORDER — ALBUTEROL SULFATE 2.5 MG/3ML
SOLUTION RESPIRATORY (INHALATION)
Status: DISPENSED
Start: 2019-07-23 | End: 2019-07-23

## 2019-07-23 RX ORDER — POTASSIUM CHLORIDE 1.5 G/1.77G
20 POWDER, FOR SOLUTION ORAL 2 TIMES DAILY
Status: DISCONTINUED | OUTPATIENT
Start: 2019-07-23 | End: 2019-08-15 | Stop reason: HOSPADM

## 2019-07-23 RX ORDER — DEXTROSE AND SODIUM CHLORIDE 5; .45 G/100ML; G/100ML
INJECTION, SOLUTION INTRAVENOUS CONTINUOUS
Status: DISCONTINUED | OUTPATIENT
Start: 2019-07-23 | End: 2019-07-23

## 2019-07-23 RX ORDER — FUROSEMIDE 10 MG/ML
40 INJECTION INTRAMUSCULAR; INTRAVENOUS ONCE
Status: COMPLETED | OUTPATIENT
Start: 2019-07-23 | End: 2019-07-23

## 2019-07-23 RX ADMIN — GABAPENTIN 600 MG: 300 CAPSULE ORAL at 00:32

## 2019-07-23 RX ADMIN — CLONIDINE HYDROCHLORIDE 0.2 MG: 0.2 TABLET ORAL at 11:54

## 2019-07-23 RX ADMIN — FOLIC ACID 1 MG: 1 TABLET ORAL at 09:04

## 2019-07-23 RX ADMIN — CYCLOBENZAPRINE 5 MG: 10 TABLET, FILM COATED ORAL at 16:43

## 2019-07-23 RX ADMIN — Medication 100 MG: at 14:16

## 2019-07-23 RX ADMIN — Medication 100 MG: at 09:04

## 2019-07-23 RX ADMIN — PROPRANOLOL HYDROCHLORIDE 10 MG: 10 TABLET ORAL at 00:35

## 2019-07-23 RX ADMIN — POTASSIUM CHLORIDE 20 MEQ: 1.5 POWDER, FOR SOLUTION ORAL at 11:55

## 2019-07-23 RX ADMIN — QUETIAPINE FUMARATE 100 MG: 100 TABLET ORAL at 00:56

## 2019-07-23 RX ADMIN — GABAPENTIN 600 MG: 300 CAPSULE ORAL at 09:04

## 2019-07-23 RX ADMIN — DIAZEPAM 10 MG: 5 TABLET ORAL at 06:16

## 2019-07-23 RX ADMIN — CYCLOBENZAPRINE 5 MG: 10 TABLET, FILM COATED ORAL at 09:03

## 2019-07-23 RX ADMIN — SODIUM CHLORIDE, PRESERVATIVE FREE 10 ML: 5 INJECTION INTRAVENOUS at 09:04

## 2019-07-23 RX ADMIN — POTASSIUM BICARBONATE 20 MEQ: 782 TABLET, EFFERVESCENT ORAL at 00:33

## 2019-07-23 RX ADMIN — Medication 10 ML: at 09:07

## 2019-07-23 RX ADMIN — DEXMEDETOMIDINE HYDROCHLORIDE 0.8 MCG/KG/HR: 100 INJECTION, SOLUTION INTRAVENOUS at 16:48

## 2019-07-23 RX ADMIN — ACETAMINOPHEN 1000 MG: 500 TABLET ORAL at 17:42

## 2019-07-23 RX ADMIN — MAGNESIUM GLUCONATE 500 MG ORAL TABLET 800 MG: 500 TABLET ORAL at 03:37

## 2019-07-23 RX ADMIN — DOCUSATE SODIUM 100 MG: 100 CAPSULE, LIQUID FILLED ORAL at 09:03

## 2019-07-23 RX ADMIN — QUETIAPINE FUMARATE 100 MG: 100 TABLET ORAL at 21:28

## 2019-07-23 RX ADMIN — FAMOTIDINE 20 MG: 20 TABLET, FILM COATED ORAL at 21:28

## 2019-07-23 RX ADMIN — PROPRANOLOL HYDROCHLORIDE 10 MG: 10 TABLET ORAL at 09:03

## 2019-07-23 RX ADMIN — THERA TABS 1 TABLET: TAB at 09:03

## 2019-07-23 RX ADMIN — CYCLOBENZAPRINE 5 MG: 10 TABLET, FILM COATED ORAL at 00:33

## 2019-07-23 RX ADMIN — FUROSEMIDE 40 MG: 10 INJECTION, SOLUTION INTRAMUSCULAR; INTRAVENOUS at 14:15

## 2019-07-23 RX ADMIN — SODIUM CHLORIDE, PRESERVATIVE FREE 10 ML: 5 INJECTION INTRAVENOUS at 21:29

## 2019-07-23 RX ADMIN — Medication 10 ML: at 21:29

## 2019-07-23 RX ADMIN — DIAZEPAM 10 MG: 5 TABLET ORAL at 00:31

## 2019-07-23 RX ADMIN — METOCLOPRAMIDE 10 MG: 10 TABLET ORAL at 18:52

## 2019-07-23 RX ADMIN — PROPRANOLOL HYDROCHLORIDE 10 MG: 10 TABLET ORAL at 16:43

## 2019-07-23 RX ADMIN — ENOXAPARIN SODIUM 30 MG: 100 INJECTION SUBCUTANEOUS at 21:28

## 2019-07-23 RX ADMIN — SODIUM CHLORIDE, POTASSIUM CHLORIDE, SODIUM LACTATE AND CALCIUM CHLORIDE 45 ML/HR: 600; 310; 30; 20 INJECTION, SOLUTION INTRAVENOUS at 00:20

## 2019-07-23 RX ADMIN — POTASSIUM CHLORIDE 20 MEQ: 1.5 POWDER, FOR SOLUTION ORAL at 21:28

## 2019-07-23 RX ADMIN — DIAZEPAM 10 MG: 5 TABLET ORAL at 17:30

## 2019-07-23 RX ADMIN — METOCLOPRAMIDE 10 MG: 10 TABLET ORAL at 06:17

## 2019-07-23 RX ADMIN — DEXTROSE AND SODIUM CHLORIDE: 5; 450 INJECTION, SOLUTION INTRAVENOUS at 10:20

## 2019-07-23 RX ADMIN — OXYCODONE HYDROCHLORIDE 10 MG: 5 TABLET ORAL at 09:03

## 2019-07-23 RX ADMIN — GABAPENTIN 600 MG: 300 CAPSULE ORAL at 16:43

## 2019-07-23 RX ADMIN — OXYCODONE HYDROCHLORIDE 10 MG: 5 TABLET ORAL at 21:29

## 2019-07-23 RX ADMIN — ACETAMINOPHEN 650 MG: 325 TABLET ORAL at 01:00

## 2019-07-23 RX ADMIN — Medication 100 MG: at 21:28

## 2019-07-23 RX ADMIN — QUETIAPINE FUMARATE 100 MG: 100 TABLET ORAL at 09:04

## 2019-07-23 RX ADMIN — METOCLOPRAMIDE 10 MG: 10 TABLET ORAL at 12:56

## 2019-07-23 RX ADMIN — METOCLOPRAMIDE 10 MG: 10 TABLET ORAL at 00:34

## 2019-07-23 RX ADMIN — Medication 75 MCG/HR: at 07:53

## 2019-07-23 RX ADMIN — Medication 75 MCG/HR: at 18:58

## 2019-07-23 RX ADMIN — OXYCODONE HYDROCHLORIDE 10 MG: 5 TABLET ORAL at 00:31

## 2019-07-23 RX ADMIN — DIAZEPAM 10 MG: 5 TABLET ORAL at 11:59

## 2019-07-23 RX ADMIN — ACETAMINOPHEN 1000 MG: 500 TABLET ORAL at 11:54

## 2019-07-23 RX ADMIN — OXYCODONE HYDROCHLORIDE 10 MG: 5 TABLET ORAL at 12:53

## 2019-07-23 RX ADMIN — ENOXAPARIN SODIUM 30 MG: 100 INJECTION SUBCUTANEOUS at 09:04

## 2019-07-23 RX ADMIN — OXYCODONE HYDROCHLORIDE 10 MG: 5 TABLET ORAL at 16:43

## 2019-07-23 RX ADMIN — FAMOTIDINE 20 MG: 20 TABLET, FILM COATED ORAL at 09:04

## 2019-07-23 ASSESSMENT — PAIN SCALES - GENERAL
PAINLEVEL_OUTOF10: 5
PAINLEVEL_OUTOF10: 5
PAINLEVEL_OUTOF10: 0

## 2019-07-23 ASSESSMENT — PULMONARY FUNCTION TESTS
PIF_VALUE: 32
PIF_VALUE: 36
PIF_VALUE: 34
PIF_VALUE: 35
PIF_VALUE: 32
PIF_VALUE: 31
PIF_VALUE: 27

## 2019-07-23 NOTE — FLOWSHEET NOTE
Patient is intubated and unable to communicate. No family present in room.  spoke to the nurse who stated that patient has sons and a brother for support and they were recently visiting today. Nurse stated that one son is at the hospital quite often and stays in the lobby. Nurse and  went to look for son but he was not in the lobby at that time. Patient has to stay sedated because he thrashes.         07/23/19 7206   Encounter Summary   Services provided to: Patient not available   Referral/Consult From: [facility] Family members; Children   Continue Visiting   (7.23.2019)   Complexity of Encounter Low   Length of Encounter 15 minutes   Routine   Type Pre-procedure   Assessment Unable to respond     Electronically signed by Renetta Jaime on 7/23/2019 at 5:48 PM

## 2019-07-23 NOTE — PROGRESS NOTES
ICU PROGRESS NOTE        PATIENT NAME: Byron BOTELLO [facility] RECORD NO. [id number]  DATE: 7/23/2019    PRIMARY CARE PHYSICIAN: Ariadna Archibald MD    HD: # 8    ASSESSMENT    Patient Active Problem List   Diagnosis    Fall    Closed fracture of multiple ribs of right side    Contusion of right lung    Fracture of transverse process of thoracic vertebra ([facility] [address]    Hemothorax on right    Pneumothorax    Closed fracture of right iliac wing (HCC)    Hypokalemia    Fx dorsal vertebra-closed ([facility] [address]    Traumatic closed fracture of distal clavicle with minimal displacement, left, initial encounter       MEDICAL DECISION MAKING AND PLAN     1. Neuro  1. Pain control: Fentanyl gtt, flexeril, cherie 600mg Q8H, tylenol mayra 10mg Q4H, d/c motrin  2. Seroquel 100mg BID  3. R T2-T4,6,9 TP fx  1. NS cleared CTLS, HOB 30 degrees, TLSO brace for comfort  4. Acute alcohol withdrawal prior to intubation  1. Thiamine & Folate  1. Valium 10mg Q6H  2. Propranolol 10mg Q8H  2. Clonidine 0.2mg  3. CV  1. MAP: 58-92  2. HR:   3. SBP: 145-209  4. Lactate: 1.02 (0.89)     3. Pulm  1. R 1st-12th rib fractures, R small hemo/pneumo, pulm contusion  1. Decompensated this morning - required BI-PAP and eventual intubation  2. [facility] vent:  1. ABG 7.48/37/72/28/96%  2. PF: 243  3. Changed from [facility] to APRV  1. 26/0 and 5/0.5  2. If PF < 300, change to 30/0 and 5/0.5  3. ABG pending  4. Repeat CT chest: R apical PTX, R hemothorax (loculated)  5. Repeat CXR: worsening R pleural effusion  3. Obtain 3D recont CT chest  4. Plan for rib plating/Thoracotomy, Call cryo rep for 7/24  5. 2U PRBC/FFP ready for OR     4. Renal  1. BUN:Cr    18/1.09  2. K 3.8 - replaced  3. UOP: 1.3 cc/kg/hr  4. Strict I/O  5. Woodall in place     5. Heme  1. S/p [facility] 7/20  2. Hgb 7.8 (9.1)    6. GI  1. TF @ goal  2. NPO after midnight for OR  3. Pepcid, Colace, Mag-Ox  4. Reglan 10mg Q6H (started 7/22)  5. Ammonia 38 (65)   6. BM x 1 (7/22)     7. ID:  1.  Tmax 39.4 -

## 2019-07-24 ENCOUNTER — APPOINTMENT (OUTPATIENT)
Dept: GENERAL RADIOLOGY | Age: 59
DRG: 957 | End: 2019-07-24
Payer: COMMERCIAL

## 2019-07-24 ENCOUNTER — ANESTHESIA (OUTPATIENT)
Dept: OPERATING ROOM | Age: 59
DRG: 957 | End: 2019-07-24
Payer: COMMERCIAL

## 2019-07-24 VITALS
DIASTOLIC BLOOD PRESSURE: 71 MMHG | OXYGEN SATURATION: 100 % | SYSTOLIC BLOOD PRESSURE: 108 MMHG | RESPIRATION RATE: 14 BRPM | TEMPERATURE: 96.4 F

## 2019-07-24 LAB
ABSOLUTE EOS #: 0 K/UL (ref 0–0.4)
ABSOLUTE EOS #: 0.18 K/UL (ref 0–0.4)
ABSOLUTE IMMATURE GRANULOCYTE: 0 K/UL (ref 0–0.3)
ABSOLUTE IMMATURE GRANULOCYTE: 0.36 K/UL (ref 0–0.3)
ABSOLUTE LYMPH #: 0.89 K/UL (ref 1–4.8)
ABSOLUTE LYMPH #: 1.99 K/UL (ref 1–4.8)
ABSOLUTE MONO #: 0.89 K/UL (ref 0.1–0.8)
ABSOLUTE MONO #: 1.63 K/UL (ref 0.1–0.8)
ALLEN TEST: ABNORMAL
ANION GAP SERPL CALCULATED.3IONS-SCNC: 14 MMOL/L (ref 9–17)
BASOPHILS # BLD: 0 % (ref 0–2)
BASOPHILS # BLD: 0 % (ref 0–2)
BASOPHILS ABSOLUTE: 0 K/UL (ref 0–0.2)
BASOPHILS ABSOLUTE: 0 K/UL (ref 0–0.2)
BLD PROD TYP BPU: NORMAL
BLD PROD TYP BPU: NORMAL
BUN BLDV-MCNC: 24 MG/DL (ref 6–20)
BUN/CREAT BLD: ABNORMAL (ref 9–20)
CALCIUM IONIZED: 1.06 MMOL/L (ref 1.13–1.33)
CALCIUM IONIZED: 1.14 MMOL/L (ref 1.13–1.33)
CALCIUM SERPL-MCNC: 8.3 MG/DL (ref 8.6–10.4)
CARBOXYHEMOGLOBIN: 2 % (ref 0–5)
CHLORIDE BLD-SCNC: 96 MMOL/L (ref 98–107)
CHLORIDE, WHOLE BLOOD: 98 MMOL/L (ref 98–110)
CO2: 27 MMOL/L (ref 20–31)
CREAT SERPL-MCNC: 0.96 MG/DL (ref 0.7–1.2)
DIFFERENTIAL TYPE: ABNORMAL
DIFFERENTIAL TYPE: ABNORMAL
DISPENSE STATUS BLOOD BANK: NORMAL
DISPENSE STATUS BLOOD BANK: NORMAL
EOSINOPHILS RELATIVE PERCENT: 0 % (ref 1–4)
EOSINOPHILS RELATIVE PERCENT: 1 % (ref 1–4)
FIO2: 30
FIO2: 30
FIO2: ABNORMAL
FIO2: ABNORMAL
GFR AFRICAN AMERICAN: >60 ML/MIN
GFR NON-AFRICAN AMERICAN: >60 ML/MIN
GFR SERPL CREATININE-BSD FRML MDRD: ABNORMAL ML/MIN/{1.73_M2}
GFR SERPL CREATININE-BSD FRML MDRD: ABNORMAL ML/MIN/{1.73_M2}
GLUCOSE BLD-MCNC: 128 MG/DL (ref 70–99)
GLUCOSE BLD-MCNC: 159 MG/DL (ref 75–110)
HCO3 ARTERIAL: 30.7 MMOL/L (ref 22–27)
HCT VFR BLD CALC: 25.3 %
HCT VFR BLD CALC: 26.7 % (ref 40.7–50.3)
HCT VFR BLD CALC: 27.2 % (ref 40.7–50.3)
HEMOGLOBIN: 8.1 GM/DL
HEMOGLOBIN: 8.6 G/DL (ref 13–17)
HEMOGLOBIN: 8.7 G/DL (ref 13–17)
IMMATURE GRANULOCYTES: 0 %
IMMATURE GRANULOCYTES: 2 %
LYMPHOCYTES # BLD: 11 % (ref 24–44)
LYMPHOCYTES # BLD: 5 % (ref 24–44)
MAGNESIUM: 1.6 MG/DL (ref 1.6–2.6)
MCH RBC QN AUTO: 30.2 PG (ref 25.2–33.5)
MCH RBC QN AUTO: 30.9 PG (ref 25.2–33.5)
MCHC RBC AUTO-ENTMCNC: 31.6 G/DL (ref 28.4–34.8)
MCHC RBC AUTO-ENTMCNC: 32.6 G/DL (ref 28.4–34.8)
MCV RBC AUTO: 94.7 FL (ref 82.6–102.9)
MCV RBC AUTO: 95.4 FL (ref 82.6–102.9)
METHEMOGLOBIN: ABNORMAL % (ref 0–1.5)
MODE: ABNORMAL
MONOCYTES # BLD: 5 % (ref 1–7)
MONOCYTES # BLD: 9 % (ref 1–7)
MORPHOLOGY: ABNORMAL
MORPHOLOGY: ABNORMAL
NEGATIVE BASE EXCESS, ART: ABNORMAL (ref 0–2)
NEGATIVE BASE EXCESS, ART: ABNORMAL MMOL/L (ref 0–2)
NOTIFICATION TIME: ABNORMAL
NOTIFICATION: ABNORMAL
NRBC AUTOMATED: 0 PER 100 WBC
NRBC AUTOMATED: 0 PER 100 WBC
O2 DEVICE/FLOW/%: ABNORMAL
O2 SAT, ARTERIAL: 91.6 % (ref 94–100)
OXYHEMOGLOBIN: ABNORMAL % (ref 95–98)
PATIENT TEMP: 37
PATIENT TEMP: ABNORMAL
PCO2 ARTERIAL: 45.7 MMHG (ref 32–45)
PCO2, ART, TEMP ADJ: ABNORMAL (ref 32–45)
PDW BLD-RTO: 14 % (ref 11.8–14.4)
PDW BLD-RTO: 14 % (ref 11.8–14.4)
PEEP/CPAP: ABNORMAL
PH ARTERIAL: 7.44 (ref 7.35–7.45)
PH, ART, TEMP ADJ: ABNORMAL (ref 7.35–7.45)
PHOSPHORUS: 3.1 MG/DL (ref 2.5–4.5)
PLATELET # BLD: 416 K/UL (ref 138–453)
PLATELET # BLD: 440 K/UL (ref 138–453)
PLATELET ESTIMATE: ABNORMAL
PLATELET ESTIMATE: ABNORMAL
PMV BLD AUTO: 9.3 FL (ref 8.1–13.5)
PMV BLD AUTO: 9.8 FL (ref 8.1–13.5)
PO2 ARTERIAL: 59.7 MMHG (ref 75–95)
PO2, ART, TEMP ADJ: ABNORMAL MMHG (ref 75–95)
POC HCO3: 29.2 MMOL/L (ref 21–28)
POC HCO3: 29.4 MMOL/L (ref 21–28)
POC HCO3: 31.6 MMOL/L (ref 21–28)
POC LACTIC ACID: 0.49 MMOL/L (ref 0.56–1.39)
POC LACTIC ACID: 0.52 MMOL/L (ref 0.56–1.39)
POC LACTIC ACID: 1.28 MMOL/L (ref 0.56–1.39)
POC O2 SATURATION: 97 % (ref 94–98)
POC O2 SATURATION: 98 % (ref 94–98)
POC O2 SATURATION: 98 % (ref 94–98)
POC PCO2 TEMP: ABNORMAL MM HG
POC PCO2: 35 MM HG (ref 35–48)
POC PCO2: 41.4 MM HG (ref 35–48)
POC PCO2: 46.6 MM HG (ref 35–48)
POC PH TEMP: ABNORMAL
POC PH: 7.44 (ref 7.35–7.45)
POC PH: 7.46 (ref 7.35–7.45)
POC PH: 7.53 (ref 7.35–7.45)
POC PO2 TEMP: ABNORMAL MM HG
POC PO2: 91.7 MM HG (ref 83–108)
POC PO2: 95 MM HG (ref 83–108)
POC PO2: 95.1 MM HG (ref 83–108)
POSITIVE BASE EXCESS, ART: 5 (ref 0–3)
POSITIVE BASE EXCESS, ART: 6 (ref 0–3)
POSITIVE BASE EXCESS, ART: 6.4 MMOL/L (ref 0–2)
POSITIVE BASE EXCESS, ART: 7 (ref 0–3)
POTASSIUM SERPL-SCNC: 4.1 MMOL/L (ref 3.7–5.3)
POTASSIUM, WHOLE BLOOD: 3.7 MMOL/L (ref 3.6–5)
PSV: ABNORMAL
PT. POSITION: ABNORMAL
RBC # BLD: 2.82 M/UL (ref 4.21–5.77)
RBC # BLD: 2.85 M/UL (ref 4.21–5.77)
RBC # BLD: ABNORMAL 10*6/UL
RBC # BLD: ABNORMAL 10*6/UL
RESPIRATORY RATE: ABNORMAL
SAMPLE SITE: ABNORMAL
SEG NEUTROPHILS: 77 % (ref 36–66)
SEG NEUTROPHILS: 90 % (ref 36–66)
SEGMENTED NEUTROPHILS ABSOLUTE COUNT: 13.94 K/UL (ref 1.8–7.7)
SEGMENTED NEUTROPHILS ABSOLUTE COUNT: 15.92 K/UL (ref 1.8–7.7)
SET RATE: ABNORMAL
SODIUM BLD-SCNC: 137 MMOL/L (ref 135–144)
SODIUM, WHOLE BLOOD: 137 MMOL/L (ref 136–145)
TCO2 (CALC), ART: 30 MMOL/L (ref 22–29)
TCO2 (CALC), ART: 31 MMOL/L (ref 22–29)
TCO2 (CALC), ART: 33 MMOL/L (ref 22–29)
TEXT FOR RESPIRATORY: ABNORMAL
TOTAL HB: ABNORMAL G/DL (ref 12–16)
TOTAL RATE: ABNORMAL
TRANSFUSION STATUS: NORMAL
TRANSFUSION STATUS: NORMAL
UNIT DIVISION: 0
UNIT DIVISION: 0
UNIT NUMBER: NORMAL
UNIT NUMBER: NORMAL
VT: ABNORMAL
WBC # BLD: 17.7 K/UL (ref 3.5–11.3)
WBC # BLD: 18.1 K/UL (ref 3.5–11.3)
WBC # BLD: ABNORMAL 10*3/UL
WBC # BLD: ABNORMAL 10*3/UL

## 2019-07-24 PROCEDURE — 6360000002 HC RX W HCPCS: Performed by: STUDENT IN AN ORGANIZED HEALTH CARE EDUCATION/TRAINING PROGRAM

## 2019-07-24 PROCEDURE — 6370000000 HC RX 637 (ALT 250 FOR IP): Performed by: SURGERY

## 2019-07-24 PROCEDURE — 6360000002 HC RX W HCPCS: Performed by: NURSE ANESTHETIST, CERTIFIED REGISTERED

## 2019-07-24 PROCEDURE — 2580000003 HC RX 258: Performed by: STUDENT IN AN ORGANIZED HEALTH CARE EDUCATION/TRAINING PROGRAM

## 2019-07-24 PROCEDURE — 37799 UNLISTED PX VASCULAR SURGERY: CPT

## 2019-07-24 PROCEDURE — 0PS204Z REPOSITION 3 OR MORE RIBS WITH INTERNAL FIXATION DEVICE, OPEN APPROACH: ICD-10-PCS | Performed by: SURGERY

## 2019-07-24 PROCEDURE — 71045 X-RAY EXAM CHEST 1 VIEW: CPT

## 2019-07-24 PROCEDURE — 94003 VENT MGMT INPAT SUBQ DAY: CPT

## 2019-07-24 PROCEDURE — C1713 ANCHOR/SCREW BN/BN,TIS/BN: HCPCS | Performed by: SURGERY

## 2019-07-24 PROCEDURE — 2709999900 HC NON-CHARGEABLE SUPPLY: Performed by: SURGERY

## 2019-07-24 PROCEDURE — 0P520ZZ DESTRUCTION OF 3 OR MORE RIBS, OPEN APPROACH: ICD-10-PCS | Performed by: SURGERY

## 2019-07-24 PROCEDURE — 6370000000 HC RX 637 (ALT 250 FOR IP): Performed by: STUDENT IN AN ORGANIZED HEALTH CARE EDUCATION/TRAINING PROGRAM

## 2019-07-24 PROCEDURE — 6370000000 HC RX 637 (ALT 250 FOR IP): Performed by: NURSE PRACTITIONER

## 2019-07-24 PROCEDURE — 0BJ08ZZ INSPECTION OF TRACHEOBRONCHIAL TREE, VIA NATURAL OR ARTIFICIAL OPENING ENDOSCOPIC: ICD-10-PCS | Performed by: SURGERY

## 2019-07-24 PROCEDURE — 2700000000 HC OXYGEN THERAPY PER DAY

## 2019-07-24 PROCEDURE — 83735 ASSAY OF MAGNESIUM: CPT

## 2019-07-24 PROCEDURE — 0W9900Z DRAINAGE OF RIGHT PLEURAL CAVITY WITH DRAINAGE DEVICE, OPEN APPROACH: ICD-10-PCS | Performed by: SURGERY

## 2019-07-24 PROCEDURE — 94762 N-INVAS EAR/PLS OXIMTRY CONT: CPT

## 2019-07-24 PROCEDURE — 2000000000 HC ICU R&B

## 2019-07-24 PROCEDURE — 2500000003 HC RX 250 WO HCPCS: Performed by: NURSE PRACTITIONER

## 2019-07-24 PROCEDURE — 86920 COMPATIBILITY TEST SPIN: CPT

## 2019-07-24 PROCEDURE — 2580000003 HC RX 258: Performed by: SURGERY

## 2019-07-24 PROCEDURE — 80048 BASIC METABOLIC PNL TOTAL CA: CPT

## 2019-07-24 PROCEDURE — 2580000003 HC RX 258: Performed by: NURSE ANESTHETIST, CERTIFIED REGISTERED

## 2019-07-24 PROCEDURE — 94770 HC ETCO2 MONITOR DAILY: CPT

## 2019-07-24 PROCEDURE — 82803 BLOOD GASES ANY COMBINATION: CPT

## 2019-07-24 PROCEDURE — 86900 BLOOD TYPING SEROLOGIC ABO: CPT

## 2019-07-24 PROCEDURE — 3600000004 HC SURGERY LEVEL 4 BASE: Performed by: SURGERY

## 2019-07-24 PROCEDURE — 84132 ASSAY OF SERUM POTASSIUM: CPT

## 2019-07-24 PROCEDURE — 83605 ASSAY OF LACTIC ACID: CPT

## 2019-07-24 PROCEDURE — 85014 HEMATOCRIT: CPT

## 2019-07-24 PROCEDURE — 2720000010 HC SURG SUPPLY STERILE: Performed by: SURGERY

## 2019-07-24 PROCEDURE — 86901 BLOOD TYPING SEROLOGIC RH(D): CPT

## 2019-07-24 PROCEDURE — 3700000000 HC ANESTHESIA ATTENDED CARE: Performed by: SURGERY

## 2019-07-24 PROCEDURE — 3600000014 HC SURGERY LEVEL 4 ADDTL 15MIN: Performed by: SURGERY

## 2019-07-24 PROCEDURE — 85025 COMPLETE CBC W/AUTO DIFF WBC: CPT

## 2019-07-24 PROCEDURE — P9041 ALBUMIN (HUMAN),5%, 50ML: HCPCS | Performed by: NURSE ANESTHETIST, CERTIFIED REGISTERED

## 2019-07-24 PROCEDURE — C1729 CATH, DRAINAGE: HCPCS | Performed by: SURGERY

## 2019-07-24 PROCEDURE — 6360000002 HC RX W HCPCS: Performed by: NURSE PRACTITIONER

## 2019-07-24 PROCEDURE — 85018 HEMOGLOBIN: CPT

## 2019-07-24 PROCEDURE — 82805 BLOOD GASES W/O2 SATURATION: CPT

## 2019-07-24 PROCEDURE — 84100 ASSAY OF PHOSPHORUS: CPT

## 2019-07-24 PROCEDURE — 2500000003 HC RX 250 WO HCPCS: Performed by: NURSE ANESTHETIST, CERTIFIED REGISTERED

## 2019-07-24 PROCEDURE — 82330 ASSAY OF CALCIUM: CPT

## 2019-07-24 PROCEDURE — 2580000003 HC RX 258: Performed by: NURSE PRACTITIONER

## 2019-07-24 PROCEDURE — 5A1955Z RESPIRATORY VENTILATION, GREATER THAN 96 CONSECUTIVE HOURS: ICD-10-PCS | Performed by: SURGERY

## 2019-07-24 PROCEDURE — 3700000001 HC ADD 15 MINUTES (ANESTHESIA): Performed by: SURGERY

## 2019-07-24 PROCEDURE — 86850 RBC ANTIBODY SCREEN: CPT

## 2019-07-24 DEVICE — IMPLANTABLE DEVICE: Type: IMPLANTABLE DEVICE | Status: FUNCTIONAL

## 2019-07-24 RX ORDER — MAGNESIUM SULFATE 1 G/100ML
1 INJECTION INTRAVENOUS ONCE
Status: COMPLETED | OUTPATIENT
Start: 2019-07-24 | End: 2019-07-24

## 2019-07-24 RX ORDER — EPHEDRINE SULFATE/0.9% NACL/PF 50 MG/5 ML
SYRINGE (ML) INTRAVENOUS PRN
Status: DISCONTINUED | OUTPATIENT
Start: 2019-07-24 | End: 2019-07-24 | Stop reason: SDUPTHER

## 2019-07-24 RX ORDER — DEXAMETHASONE SODIUM PHOSPHATE 10 MG/ML
INJECTION INTRAMUSCULAR; INTRAVENOUS PRN
Status: DISCONTINUED | OUTPATIENT
Start: 2019-07-24 | End: 2019-07-24 | Stop reason: SDUPTHER

## 2019-07-24 RX ORDER — MIDAZOLAM HYDROCHLORIDE 1 MG/ML
INJECTION INTRAMUSCULAR; INTRAVENOUS PRN
Status: DISCONTINUED | OUTPATIENT
Start: 2019-07-24 | End: 2019-07-24 | Stop reason: SDUPTHER

## 2019-07-24 RX ORDER — PIPERACILLIN SODIUM, TAZOBACTAM SODIUM 4; .5 G/20ML; G/20ML
INJECTION, POWDER, LYOPHILIZED, FOR SOLUTION INTRAVENOUS PRN
Status: DISCONTINUED | OUTPATIENT
Start: 2019-07-24 | End: 2019-07-24 | Stop reason: SDUPTHER

## 2019-07-24 RX ORDER — ALBUMIN, HUMAN INJ 5% 5 %
SOLUTION INTRAVENOUS PRN
Status: DISCONTINUED | OUTPATIENT
Start: 2019-07-24 | End: 2019-07-24 | Stop reason: SDUPTHER

## 2019-07-24 RX ORDER — MAGNESIUM HYDROXIDE 1200 MG/15ML
LIQUID ORAL CONTINUOUS PRN
Status: COMPLETED | OUTPATIENT
Start: 2019-07-24 | End: 2019-07-24

## 2019-07-24 RX ORDER — ROCURONIUM BROMIDE 10 MG/ML
INJECTION, SOLUTION INTRAVENOUS PRN
Status: DISCONTINUED | OUTPATIENT
Start: 2019-07-24 | End: 2019-07-24 | Stop reason: SDUPTHER

## 2019-07-24 RX ORDER — SODIUM CHLORIDE, SODIUM LACTATE, POTASSIUM CHLORIDE, CALCIUM CHLORIDE 600; 310; 30; 20 MG/100ML; MG/100ML; MG/100ML; MG/100ML
INJECTION, SOLUTION INTRAVENOUS CONTINUOUS
Status: DISCONTINUED | OUTPATIENT
Start: 2019-07-24 | End: 2019-08-01

## 2019-07-24 RX ORDER — MORPHINE SULFATE 10 MG/ML
INJECTION, SOLUTION INTRAMUSCULAR; INTRAVENOUS PRN
Status: DISCONTINUED | OUTPATIENT
Start: 2019-07-24 | End: 2019-07-24 | Stop reason: SDUPTHER

## 2019-07-24 RX ORDER — VANCOMYCIN HYDROCHLORIDE 1 G/20ML
INJECTION, POWDER, LYOPHILIZED, FOR SOLUTION INTRAVENOUS PRN
Status: DISCONTINUED | OUTPATIENT
Start: 2019-07-24 | End: 2019-07-24 | Stop reason: SDUPTHER

## 2019-07-24 RX ORDER — SODIUM CHLORIDE, SODIUM LACTATE, POTASSIUM CHLORIDE, CALCIUM CHLORIDE 600; 310; 30; 20 MG/100ML; MG/100ML; MG/100ML; MG/100ML
INJECTION, SOLUTION INTRAVENOUS CONTINUOUS
Status: DISCONTINUED | OUTPATIENT
Start: 2019-07-24 | End: 2019-08-02

## 2019-07-24 RX ORDER — FENTANYL CITRATE 50 UG/ML
INJECTION, SOLUTION INTRAMUSCULAR; INTRAVENOUS PRN
Status: DISCONTINUED | OUTPATIENT
Start: 2019-07-24 | End: 2019-07-24 | Stop reason: SDUPTHER

## 2019-07-24 RX ORDER — LIDOCAINE HYDROCHLORIDE 10 MG/ML
INJECTION, SOLUTION EPIDURAL; INFILTRATION; INTRACAUDAL; PERINEURAL PRN
Status: DISCONTINUED | OUTPATIENT
Start: 2019-07-24 | End: 2019-07-24 | Stop reason: SDUPTHER

## 2019-07-24 RX ORDER — SODIUM CHLORIDE, SODIUM LACTATE, POTASSIUM CHLORIDE, CALCIUM CHLORIDE 600; 310; 30; 20 MG/100ML; MG/100ML; MG/100ML; MG/100ML
INJECTION, SOLUTION INTRAVENOUS CONTINUOUS PRN
Status: DISCONTINUED | OUTPATIENT
Start: 2019-07-24 | End: 2019-07-24 | Stop reason: SDUPTHER

## 2019-07-24 RX ADMIN — FOLIC ACID 1 MG: 1 TABLET ORAL at 09:02

## 2019-07-24 RX ADMIN — MIDAZOLAM HYDROCHLORIDE 2 MG: 1 INJECTION, SOLUTION INTRAMUSCULAR; INTRAVENOUS at 15:00

## 2019-07-24 RX ADMIN — MAGNESIUM SULFATE HEPTAHYDRATE 1 G: 1 INJECTION, SOLUTION INTRAVENOUS at 09:12

## 2019-07-24 RX ADMIN — ALBUMIN (HUMAN) 50 ML: 12.5 INJECTION, SOLUTION INTRAVENOUS at 12:40

## 2019-07-24 RX ADMIN — PHENYLEPHRINE HYDROCHLORIDE 100 MCG: 10 INJECTION INTRAVENOUS at 12:54

## 2019-07-24 RX ADMIN — MORPHINE SULFATE 2 MG: 10 INJECTION, SOLUTION INTRAMUSCULAR; INTRAVENOUS at 14:26

## 2019-07-24 RX ADMIN — FENTANYL CITRATE 50 MCG: 50 INJECTION, SOLUTION INTRAMUSCULAR; INTRAVENOUS at 12:20

## 2019-07-24 RX ADMIN — MORPHINE SULFATE 2 MG: 10 INJECTION, SOLUTION INTRAMUSCULAR; INTRAVENOUS at 14:48

## 2019-07-24 RX ADMIN — DIAZEPAM 10 MG: 5 TABLET ORAL at 05:52

## 2019-07-24 RX ADMIN — OXYCODONE HYDROCHLORIDE 10 MG: 5 TABLET ORAL at 05:51

## 2019-07-24 RX ADMIN — PROPRANOLOL HYDROCHLORIDE 10 MG: 10 TABLET ORAL at 09:01

## 2019-07-24 RX ADMIN — FENTANYL CITRATE 100 MCG: 50 INJECTION, SOLUTION INTRAMUSCULAR; INTRAVENOUS at 15:09

## 2019-07-24 RX ADMIN — Medication 10 ML: at 21:13

## 2019-07-24 RX ADMIN — PHENYLEPHRINE HYDROCHLORIDE 100 MCG: 10 INJECTION INTRAVENOUS at 12:29

## 2019-07-24 RX ADMIN — ROCURONIUM BROMIDE 50 MG: 10 INJECTION INTRAVENOUS at 10:37

## 2019-07-24 RX ADMIN — Medication 5 MG: at 12:53

## 2019-07-24 RX ADMIN — OXYCODONE HYDROCHLORIDE 10 MG: 5 TABLET ORAL at 20:51

## 2019-07-24 RX ADMIN — PHENYLEPHRINE HYDROCHLORIDE 100 MCG: 10 INJECTION INTRAVENOUS at 12:26

## 2019-07-24 RX ADMIN — POTASSIUM CHLORIDE 20 MEQ: 1.5 POWDER, FOR SOLUTION ORAL at 20:51

## 2019-07-24 RX ADMIN — FENTANYL CITRATE 50 MCG: 50 INJECTION, SOLUTION INTRAMUSCULAR; INTRAVENOUS at 12:07

## 2019-07-24 RX ADMIN — Medication 10 MG: at 12:58

## 2019-07-24 RX ADMIN — ENOXAPARIN SODIUM 30 MG: 100 INJECTION SUBCUTANEOUS at 22:00

## 2019-07-24 RX ADMIN — MORPHINE SULFATE 2 MG: 10 INJECTION, SOLUTION INTRAMUSCULAR; INTRAVENOUS at 14:00

## 2019-07-24 RX ADMIN — QUETIAPINE FUMARATE 100 MG: 100 TABLET ORAL at 09:01

## 2019-07-24 RX ADMIN — PROPRANOLOL HYDROCHLORIDE 10 MG: 10 TABLET ORAL at 17:03

## 2019-07-24 RX ADMIN — LIDOCAINE HYDROCHLORIDE 50 MG: 10 INJECTION, SOLUTION EPIDURAL; INFILTRATION; INTRACAUDAL; PERINEURAL at 10:37

## 2019-07-24 RX ADMIN — Medication 10 MG: at 13:15

## 2019-07-24 RX ADMIN — OXYCODONE HYDROCHLORIDE 10 MG: 5 TABLET ORAL at 09:00

## 2019-07-24 RX ADMIN — GABAPENTIN 600 MG: 300 CAPSULE ORAL at 17:03

## 2019-07-24 RX ADMIN — Medication 100 MG: at 20:51

## 2019-07-24 RX ADMIN — PIPERACILLIN SODIUM AND TAZOBACTAM SODIUM 4.5 G: 4; .5 INJECTION, POWDER, LYOPHILIZED, FOR SOLUTION INTRAVENOUS at 17:47

## 2019-07-24 RX ADMIN — MIDAZOLAM HYDROCHLORIDE 2 MG: 1 INJECTION, SOLUTION INTRAMUSCULAR; INTRAVENOUS at 10:36

## 2019-07-24 RX ADMIN — ROCURONIUM BROMIDE 25 MG: 10 INJECTION INTRAVENOUS at 12:29

## 2019-07-24 RX ADMIN — SODIUM CHLORIDE, POTASSIUM CHLORIDE, SODIUM LACTATE AND CALCIUM CHLORIDE: 600; 310; 30; 20 INJECTION, SOLUTION INTRAVENOUS at 17:40

## 2019-07-24 RX ADMIN — DIAZEPAM 10 MG: 5 TABLET ORAL at 17:11

## 2019-07-24 RX ADMIN — FENTANYL CITRATE 50 MCG: 50 INJECTION, SOLUTION INTRAMUSCULAR; INTRAVENOUS at 11:49

## 2019-07-24 RX ADMIN — VANCOMYCIN HYDROCHLORIDE 1500 MG: 1 INJECTION, POWDER, LYOPHILIZED, FOR SOLUTION INTRAVENOUS at 11:40

## 2019-07-24 RX ADMIN — CYCLOBENZAPRINE 5 MG: 10 TABLET, FILM COATED ORAL at 17:03

## 2019-07-24 RX ADMIN — THERA TABS 1 TABLET: TAB at 09:01

## 2019-07-24 RX ADMIN — QUETIAPINE FUMARATE 100 MG: 100 TABLET ORAL at 20:51

## 2019-07-24 RX ADMIN — ACETAMINOPHEN 1000 MG: 500 TABLET ORAL at 20:50

## 2019-07-24 RX ADMIN — Medication 100 MCG/HR: at 22:40

## 2019-07-24 RX ADMIN — SODIUM CHLORIDE, POTASSIUM CHLORIDE, SODIUM LACTATE AND CALCIUM CHLORIDE: 600; 310; 30; 20 INJECTION, SOLUTION INTRAVENOUS at 10:32

## 2019-07-24 RX ADMIN — METOCLOPRAMIDE 10 MG: 10 TABLET ORAL at 20:50

## 2019-07-24 RX ADMIN — SODIUM CHLORIDE, POTASSIUM CHLORIDE, SODIUM LACTATE AND CALCIUM CHLORIDE: 600; 310; 30; 20 INJECTION, SOLUTION INTRAVENOUS at 17:41

## 2019-07-24 RX ADMIN — DEXAMETHASONE SODIUM PHOSPHATE 10 MG: 10 INJECTION INTRAMUSCULAR; INTRAVENOUS at 11:52

## 2019-07-24 RX ADMIN — CYCLOBENZAPRINE 5 MG: 10 TABLET, FILM COATED ORAL at 00:53

## 2019-07-24 RX ADMIN — OXYCODONE HYDROCHLORIDE 10 MG: 5 TABLET ORAL at 17:04

## 2019-07-24 RX ADMIN — FENTANYL CITRATE 50 MCG: 50 INJECTION, SOLUTION INTRAMUSCULAR; INTRAVENOUS at 10:49

## 2019-07-24 RX ADMIN — FENTANYL CITRATE 50 MCG: 50 INJECTION, SOLUTION INTRAMUSCULAR; INTRAVENOUS at 10:38

## 2019-07-24 RX ADMIN — MORPHINE SULFATE 2 MG: 10 INJECTION, SOLUTION INTRAMUSCULAR; INTRAVENOUS at 14:15

## 2019-07-24 RX ADMIN — FAMOTIDINE 20 MG: 20 TABLET, FILM COATED ORAL at 22:00

## 2019-07-24 RX ADMIN — CYCLOBENZAPRINE 5 MG: 10 TABLET, FILM COATED ORAL at 09:01

## 2019-07-24 RX ADMIN — ROCURONIUM BROMIDE 50 MG: 10 INJECTION INTRAVENOUS at 14:37

## 2019-07-24 RX ADMIN — SODIUM CHLORIDE, PRESERVATIVE FREE 10 ML: 5 INJECTION INTRAVENOUS at 20:51

## 2019-07-24 RX ADMIN — Medication 100 MG: at 09:01

## 2019-07-24 RX ADMIN — MORPHINE SULFATE 2 MG: 10 INJECTION, SOLUTION INTRAMUSCULAR; INTRAVENOUS at 14:37

## 2019-07-24 RX ADMIN — Medication 5 MG: at 14:18

## 2019-07-24 RX ADMIN — DEXMEDETOMIDINE HYDROCHLORIDE 0.6 MCG/KG/HR: 100 INJECTION, SOLUTION INTRAVENOUS at 10:17

## 2019-07-24 RX ADMIN — MIDAZOLAM HYDROCHLORIDE 2 MG: 1 INJECTION, SOLUTION INTRAMUSCULAR; INTRAVENOUS at 14:28

## 2019-07-24 RX ADMIN — FAMOTIDINE 20 MG: 20 TABLET, FILM COATED ORAL at 09:01

## 2019-07-24 RX ADMIN — ROCURONIUM BROMIDE 50 MG: 10 INJECTION INTRAVENOUS at 11:42

## 2019-07-24 RX ADMIN — Medication 5 MG: at 14:09

## 2019-07-24 RX ADMIN — OXYCODONE HYDROCHLORIDE 10 MG: 5 TABLET ORAL at 00:53

## 2019-07-24 RX ADMIN — PIPERACILLIN SODIUM AND TAZOBACTAM SODIUM 4.5 G: 4; .5 INJECTION, POWDER, LYOPHILIZED, FOR SOLUTION INTRAVENOUS at 11:31

## 2019-07-24 RX ADMIN — SODIUM CHLORIDE, POTASSIUM CHLORIDE, SODIUM LACTATE AND CALCIUM CHLORIDE: 600; 310; 30; 20 INJECTION, SOLUTION INTRAVENOUS at 17:42

## 2019-07-24 RX ADMIN — FENTANYL CITRATE 100 MCG: 50 INJECTION, SOLUTION INTRAMUSCULAR; INTRAVENOUS at 01:55

## 2019-07-24 RX ADMIN — Medication 10 MG: at 13:08

## 2019-07-24 RX ADMIN — VANCOMYCIN HYDROCHLORIDE 1500 MG: 10 INJECTION, POWDER, LYOPHILIZED, FOR SOLUTION INTRAVENOUS at 17:52

## 2019-07-24 RX ADMIN — DIAZEPAM 10 MG: 5 TABLET ORAL at 00:53

## 2019-07-24 RX ADMIN — GABAPENTIN 600 MG: 300 CAPSULE ORAL at 09:01

## 2019-07-24 RX ADMIN — DEXMEDETOMIDINE HYDROCHLORIDE 0.8 MCG/KG/HR: 100 INJECTION, SOLUTION INTRAVENOUS at 01:40

## 2019-07-24 RX ADMIN — ACETAMINOPHEN 1000 MG: 500 TABLET ORAL at 04:20

## 2019-07-24 RX ADMIN — CALCIUM GLUCONATE 1 G: 98 INJECTION, SOLUTION INTRAVENOUS at 16:39

## 2019-07-24 RX ADMIN — SODIUM CHLORIDE, PRESERVATIVE FREE 10 ML: 5 INJECTION INTRAVENOUS at 09:18

## 2019-07-24 RX ADMIN — GABAPENTIN 600 MG: 300 CAPSULE ORAL at 00:53

## 2019-07-24 RX ADMIN — Medication 5 MG: at 14:29

## 2019-07-24 RX ADMIN — ROCURONIUM BROMIDE 25 MG: 10 INJECTION INTRAVENOUS at 12:19

## 2019-07-24 ASSESSMENT — PULMONARY FUNCTION TESTS
PIF_VALUE: 23
PIF_VALUE: 29
PIF_VALUE: 31
PIF_VALUE: 2
PIF_VALUE: 3
PIF_VALUE: 28
PIF_VALUE: 32
PIF_VALUE: 28
PIF_VALUE: 30
PIF_VALUE: 26
PIF_VALUE: 20
PIF_VALUE: 28
PIF_VALUE: 31
PIF_VALUE: 32
PIF_VALUE: 33
PIF_VALUE: 29
PIF_VALUE: 29
PIF_VALUE: 22
PIF_VALUE: 28
PIF_VALUE: 31
PIF_VALUE: 36
PIF_VALUE: 26
PIF_VALUE: 27
PIF_VALUE: 18
PIF_VALUE: 25
PIF_VALUE: 22
PIF_VALUE: 27
PIF_VALUE: 17
PIF_VALUE: 27
PIF_VALUE: 28
PIF_VALUE: 28
PIF_VALUE: 36
PIF_VALUE: 29
PIF_VALUE: 27
PIF_VALUE: 29
PIF_VALUE: 29
PIF_VALUE: 21
PIF_VALUE: 27
PIF_VALUE: 26
PIF_VALUE: 31
PIF_VALUE: 3
PIF_VALUE: 28
PIF_VALUE: 29
PIF_VALUE: 31
PIF_VALUE: 20
PIF_VALUE: 16
PIF_VALUE: 21
PIF_VALUE: 27
PIF_VALUE: 33
PIF_VALUE: 27
PIF_VALUE: 26
PIF_VALUE: 23
PIF_VALUE: 28
PIF_VALUE: 27
PIF_VALUE: 28
PIF_VALUE: 26
PIF_VALUE: 29
PIF_VALUE: 28
PIF_VALUE: 30
PIF_VALUE: 22
PIF_VALUE: 26
PIF_VALUE: 22
PIF_VALUE: 20
PIF_VALUE: 31
PIF_VALUE: 20
PIF_VALUE: 10
PIF_VALUE: 26
PIF_VALUE: 30
PIF_VALUE: 29
PIF_VALUE: 28
PIF_VALUE: 17
PIF_VALUE: 30
PIF_VALUE: 35
PIF_VALUE: 27
PIF_VALUE: 27
PIF_VALUE: 35
PIF_VALUE: 21
PIF_VALUE: 29
PIF_VALUE: 32
PIF_VALUE: 28
PIF_VALUE: 28
PIF_VALUE: 21
PIF_VALUE: 10
PIF_VALUE: 21
PIF_VALUE: 32
PIF_VALUE: 26
PIF_VALUE: 23
PIF_VALUE: 27
PIF_VALUE: 32
PIF_VALUE: 31
PIF_VALUE: 20
PIF_VALUE: 26
PIF_VALUE: 33
PIF_VALUE: 28
PIF_VALUE: 27
PIF_VALUE: 32
PIF_VALUE: 19
PIF_VALUE: 28
PIF_VALUE: 28
PIF_VALUE: 31
PIF_VALUE: 27
PIF_VALUE: 29
PIF_VALUE: 27
PIF_VALUE: 29
PIF_VALUE: 31
PIF_VALUE: 27
PIF_VALUE: 31
PIF_VALUE: 31
PIF_VALUE: 27
PIF_VALUE: 31
PIF_VALUE: 27
PIF_VALUE: 29
PIF_VALUE: 20
PIF_VALUE: 26
PIF_VALUE: 35
PIF_VALUE: 35
PIF_VALUE: 25
PIF_VALUE: 27
PIF_VALUE: 25
PIF_VALUE: 33
PIF_VALUE: 29
PIF_VALUE: 36
PIF_VALUE: 10
PIF_VALUE: 26
PIF_VALUE: 32
PIF_VALUE: 28
PIF_VALUE: 27
PIF_VALUE: 29
PIF_VALUE: 18
PIF_VALUE: 35
PIF_VALUE: 28
PIF_VALUE: 26
PIF_VALUE: 27
PIF_VALUE: 20
PIF_VALUE: 29
PIF_VALUE: 28
PIF_VALUE: 27
PIF_VALUE: 20
PIF_VALUE: 27
PIF_VALUE: 20
PIF_VALUE: 28
PIF_VALUE: 29
PIF_VALUE: 28
PIF_VALUE: 20
PIF_VALUE: 2
PIF_VALUE: 27
PIF_VALUE: 28
PIF_VALUE: 20
PIF_VALUE: 27
PIF_VALUE: 36
PIF_VALUE: 27
PIF_VALUE: 29
PIF_VALUE: 28
PIF_VALUE: 26
PIF_VALUE: 31
PIF_VALUE: 32
PIF_VALUE: 30
PIF_VALUE: 34
PIF_VALUE: 28
PIF_VALUE: 27
PIF_VALUE: 26
PIF_VALUE: 28
PIF_VALUE: 27
PIF_VALUE: 28
PIF_VALUE: 30
PIF_VALUE: 11
PIF_VALUE: 26
PIF_VALUE: 19
PIF_VALUE: 19
PIF_VALUE: 22
PIF_VALUE: 27
PIF_VALUE: 13
PIF_VALUE: 30
PIF_VALUE: 28
PIF_VALUE: 28
PIF_VALUE: 27
PIF_VALUE: 21
PIF_VALUE: 29
PIF_VALUE: 25
PIF_VALUE: 23
PIF_VALUE: 35
PIF_VALUE: 27
PIF_VALUE: 17
PIF_VALUE: 29
PIF_VALUE: 31
PIF_VALUE: 28
PIF_VALUE: 30
PIF_VALUE: 27
PIF_VALUE: 30
PIF_VALUE: 28
PIF_VALUE: 28
PIF_VALUE: 29
PIF_VALUE: 28
PIF_VALUE: 28
PIF_VALUE: 35
PIF_VALUE: 28
PIF_VALUE: 30
PIF_VALUE: 25
PIF_VALUE: 32
PIF_VALUE: 32
PIF_VALUE: 29
PIF_VALUE: 22
PIF_VALUE: 28
PIF_VALUE: 33
PIF_VALUE: 27
PIF_VALUE: 31
PIF_VALUE: 31
PIF_VALUE: 28
PIF_VALUE: 28
PIF_VALUE: 27
PIF_VALUE: 20
PIF_VALUE: 27
PIF_VALUE: 31
PIF_VALUE: 21
PIF_VALUE: 9
PIF_VALUE: 27
PIF_VALUE: 21
PIF_VALUE: 1
PIF_VALUE: 28
PIF_VALUE: 26
PIF_VALUE: 26
PIF_VALUE: 30
PIF_VALUE: 28
PIF_VALUE: 28
PIF_VALUE: 34
PIF_VALUE: 32
PIF_VALUE: 29
PIF_VALUE: 30
PIF_VALUE: 29
PIF_VALUE: 27
PIF_VALUE: 29
PIF_VALUE: 30
PIF_VALUE: 21
PIF_VALUE: 28
PIF_VALUE: 20
PIF_VALUE: 32
PIF_VALUE: 32
PIF_VALUE: 20
PIF_VALUE: 21
PIF_VALUE: 30
PIF_VALUE: 26
PIF_VALUE: 27
PIF_VALUE: 35
PIF_VALUE: 28
PIF_VALUE: 10
PIF_VALUE: 35
PIF_VALUE: 33
PIF_VALUE: 28
PIF_VALUE: 26
PIF_VALUE: 27
PIF_VALUE: 35
PIF_VALUE: 35
PIF_VALUE: 29
PIF_VALUE: 31
PIF_VALUE: 25
PIF_VALUE: 31
PIF_VALUE: 28
PIF_VALUE: 27
PIF_VALUE: 28
PIF_VALUE: 29
PIF_VALUE: 32
PIF_VALUE: 28
PIF_VALUE: 31
PIF_VALUE: 29
PIF_VALUE: 33
PIF_VALUE: 20
PIF_VALUE: 25
PIF_VALUE: 35
PIF_VALUE: 29
PIF_VALUE: 28
PIF_VALUE: 31
PIF_VALUE: 28
PIF_VALUE: 28
PIF_VALUE: 27
PIF_VALUE: 33
PIF_VALUE: 34
PIF_VALUE: 26
PIF_VALUE: 27
PIF_VALUE: 31
PIF_VALUE: 30
PIF_VALUE: 33

## 2019-07-24 ASSESSMENT — PAIN SCALES - GENERAL: PAINLEVEL_OUTOF10: 0

## 2019-07-24 NOTE — PLAN OF CARE
Impaired:  Goal: Demonstrations of improved sensory functioning will increase  Description  Demonstrations of improved sensory functioning will increase  7/23/2019 2151 by Torito Enciso RN  Outcome: Ongoing  7/23/2019 0827 by Beth James RN  Outcome: Ongoing  Goal: Decrease in sensory misperception frequency  Description  Decrease in sensory misperception frequency  7/23/2019 2151 by Torito Enciso RN  Outcome: Ongoing  7/23/2019 0827 by Beth James RN  Outcome: Ongoing  Goal: Able to refrain from responding to false sensory perceptions  Description  Able to refrain from responding to false sensory perceptions  7/23/2019 2151 by Torito Enciso RN  Outcome: Ongoing  7/23/2019 0827 by Beth James RN  Outcome: Ongoing  Goal: Demonstrates accurate environmental perceptions  Description  Demonstrates accurate environmental perceptions  7/23/2019 2151 by Torito Enciso RN  Outcome: Ongoing  7/23/2019 0827 by Beth James RN  Outcome: Ongoing  Goal: Able to distinguish between reality-based and nonreality-based thinking  Description  Able to distinguish between reality-based and nonreality-based thinking  7/23/2019 2151 by Torito Enciso RN  Outcome: Ongoing  7/23/2019 0827 by Beth James RN  Outcome: Ongoing  Goal: Able to interrupt nonreality-based thinking  Description  Able to interrupt nonreality-based thinking  7/23/2019 2151 by Torito Enciso RN  Outcome: Ongoing  7/23/2019 0827 by Beth James RN  Outcome: Ongoing     Problem: Sleep Pattern Disturbance:  Goal: Appears well-rested  Description  Appears well-rested  7/23/2019 2151 by Torito Enciso RN  Outcome: Ongoing  7/23/2019 0827 by Beth James RN  Outcome: Ongoing     Problem: OXYGENATION/RESPIRATORY FUNCTION  Goal: Patient will maintain patent airway  7/23/2019 2151 by Torito Enciso RN  Outcome: Ongoing  7/23/2019 0827 by Beth James RN  Outcome: Ongoing  Goal: Patient will achieve/maintain normal respiratory

## 2019-07-24 NOTE — PROGRESS NOTES
ICU PROGRESS NOTE        PATIENT NAME: Adrian Alonso [facility] RECORD NO. [id number]  DATE: 2019    PRIMARY CARE PHYSICIAN: Asher Greenfield MD    HD: # 9    ASSESSMENT    Patient Active Problem List   Diagnosis    Fall    Closed fracture of multiple ribs of right side    Contusion of right lung    Fracture of transverse process of thoracic vertebra ([facility] [address]    Hemothorax on right    Pneumothorax    Closed fracture of right iliac wing (HCC)    Hypokalemia    Fx dorsal vertebra-closed ([address]    Traumatic closed fracture of distal clavicle with minimal displacement, left, initial encounter       MEDICAL DECISION MAKING AND PLAN     1. Neuro  1. Pain control: flexeril, cherie 600mg Q8H, tylenol mayra 10mg Q4H, d/c motrin  2. Fent gtt stopped, changed to PRN  3. Seroquel 100mg BID  4. R T2-T4,6,9 TP fx  2. NS cleared CTLS, HOB 30 degrees, TLSO brace for comfort  5. Acute alcohol withdrawal prior to intubation  2. Thiamine & Folate  3. Valium 10mg Q6H  4. Propranolol 10mg Q8H  5. Clonidine 0.2mg Q8H  2. CV  1. MAP: 58-92  2. HR:   3. SBP: 145-209  4. Lactate: 1.02 (0.89)     3. Pulm  1. R 1st-12th rib fractures, R small hemo/pneumo, pulm contusion  2. [facility] vent:  1. ABG 7.439/46.6/91.7/31.6/97%  2. PF: 303  1. APRV: 30/0, 5/0.5, 30%  3. Repeat CT chest: R apical PTX, R hemothorax (loculated)  3. Obtain 3D recont CT chest  4. Plan for rib plating/Thoracotomy today  5. 2U PRBC/FFP ready for OR     4. Renal  1. NS @ 125cc/hr  2. BUN:Cr    24/0.96  3. K 4.1 - replaced  4. ICa: 1.06- replaced  5. Phos: 3.1  6. Ma.6- replaced  7. UOP: 1.0 cc/kg/hr  8. Strict I/O  9. Woodall in place     5. Heme  1. S/p 2uPRBC 7/20  2. Hgb 8.7 (7.8)     6. GI  1. TF @ goal  2. NPO after midnight for OR  3. Pepcid, Colace, Mag-Ox  4. Reglan 10mg Q6H (started )   5. BM x 1 ()     7. ID:  1. Tmax 38.0 - responded to tylenol  2. WBC 18.1 (13, 15)  3.  No source of infection identified at this time     8. Endo:  1. Glucose 130s  2. No interventions     9. Tubes/Lines  1. R CT  1. 30mL blood out yesterday, to suction  2. Right radial art line, poss. replace post OR  3. Woodall  4. PIV  5. OG     10. Prophylaxis  1. Pepcid  2. Pr-Lovenox  3. Thiamine, folate     11. MSK        - R comminuted iliac wing fx and left distal clavicular fx                    -Partial WBAT RLE     12. Dispo  1. ICU  2. Remain intubated  3. OR today  4. Post op- CXR, CBC, ABG w/ lac     CHECKLIST     CAM-ICU RASS: -1 - +1  RESTRAINTS: yes  IVF: yes  NUTRITION: yes  ANTIBIOTICS: no  GI: yes  DVT: lovenox  GLYCEMIC CONTROL: no  HOB >45: no, 30*  MOBILITY: bedrest  SBT: no, APRV    SUBJECTIVE    Acute Events Overnight: hypoTN on Art line, but not correlating with BP cuff as before, 2+ pulses throughout    Cherrie Eglin  Is resting comfortably this morning. Weaned off Fentanyl gtt this AM, on precedex 0.6mg/min. Temp has improved from previous nights, 100.4 max. Has remained on APRV, planned OR today. UOP satisfactory, BM yesterday. OBJECTIVE  VITALS: Temp: Temp: 100.4 °F (38 °C)Temp  Av.1 °F (37.8 °C)  Min: 98.4 °F (36.9 °C)  Max: 101.7 °F (28.6 °C) BP Systolic (92MAR), NDI:413 , Min:85 , QMF:743   Diastolic (52IBL), LQX:43, Min:53, Max:78   Pulse Pulse  Av.2  Min: 79  Max: 117 Resp Resp  Av  Min: 12  Max: 31 Pulse ox SpO2  Av.5 %  Min: 93 %  Max: 100 %    CONSTITUTIONAL: Pt intubated and sedated, comfortable after temporarily removing tubing from system and resetting pressures  HEENT: ETT in place, normocephalic, atraumatic   LUNGS: B/L breath sounds auscultated, equal chest rise, no wheezing  CV: RRR, normal S1 and S2, Right CT in place, no active bleeding.   GI: abd soft, nondistended, no ecchymosis/bleeding/swelling.  No fluid wave, bowel sounds  MUSCULOSKELETAL: peripheral lines in place without bleeding, no obvious deformities, 2+ b/l radial and DP pulses  NEUROLOGIC: pt able to communicate with shaking

## 2019-07-24 NOTE — PROGRESS NOTES
Mag-Ox  3. Reglan 10mg Q6H (started )   4. BM x 1 ()     7. ID:  1. Tmax 38.0 - responded to tylenol  2. EUL 19.0  3. Clinically suspected PNA  1. Vanc 15mg/kg BID x 8 days (end )  2. Zosyn ER 4.5mg TID x 8 days (end )    8. Endo:  1. Glucose 130s  2. No interventions     9. Tubes/Lines  1. R CT  1. 30mL blood out yesterday, to suction  2. Right radial art line, poss. replace post OR  3. Woodall  4. PIV  5. OG     10. Prophylaxis  1. Pepcid  2. Pr-Lovenox  3. Thiamine, folate     11. MSK        - R comminuted iliac wing fx and left distal clavicular fx                    -Partial WBAT RLE     12. Dispo  1. ICU  2. Remain intubated  3. OR today  4. Post op- CXR, CBC, ABG w/ lac     CHECKLIST     CAM-ICU RASS: -1 - +1  RESTRAINTS: yes  IVF: kvo, TF restarted  NUTRITION: yes  ANTIBIOTICS: yes, vanc and zosyn  GI: yes  DVT: lovenox  GLYCEMIC CONTROL: no  HOB >45: no, 30*  MOBILITY: bedrest  SBT: no, APRV    SUBJECTIVE      Marijean Custard  S/p:   1. Right exploratory thoracotomy  2. Evacuation of retained hemothorax  3. Cryoablation of rib interspace 5-8  4. Rib plating of ribs 6, 7, 8, 9th ribs  5. Placement of 3 chest tubes                - Anterior CT #1: anterior of lung, straight 32 [place]                - Middle CT #2: At base of diaphragm, curved 28 [place]                - Posterior CT #3: Posterior of lung, straight 32 [place]  6. Removal of previous right chest sided chest tube    Pt is sedated at this time, in NAD, with 3 CT to water seal on the right. Bandages in place with no active bleeding or swelling at surgical sites.        OBJECTIVE  VITALS: Temp: Temp: 99 °F (37.2 °C)Temp  Av.4 °F (35.8 °C)  Min: 96.1 °F (35.6 °C)  Max: 100.4 °F (38 °C) BP Systolic (18LEK), OVM:003 , Min:85 , QMV:175   Diastolic (08KDI), BOI:49, Min:53, Max:78   Pulse Pulse  Av  Min: 75  Max: 117 Resp Resp  Av.2  Min: 0  Max: 31 Pulse ox SpO2  Av.9 %  Min: 86 %  Max: 100 %    CONSTITUTIONAL: Pt intubated

## 2019-07-24 NOTE — ANESTHESIA PRE PROCEDURE
Department of Anesthesiology  Preprocedure Note       Name:  Tiffani Mckeon   Age:  63 y.o.  :  1960                                          MRN:  [id number]         Date:  2019      Surgeon: Yandy Ngo):  Jj Lemos MD    Procedure: THORACOTOMY, CRYO ABLATION (RESIDENTS TO NOTIFY REPS) (Right )  RIB  PLATING (Right )    Medications prior to admission:   Prior to Admission medications    Medication Sig Start Date End Date Taking? Authorizing Provider   sennosides-docusate sodium (SENOKOT-S) 8.6-50 MG tablet Take 2 tablets by mouth daily as needed for Constipation 7/19/19 7/26/19  Mare Days, MD   acetaminophen (TYLENOL) 500 MG tablet Take 2 tablets by mouth every 6 hours as needed for Pain 7/16/19 7/23/19  Mare Days, MD   ibuprofen (ADVIL;MOTRIN) 400 MG tablet Take 1 tablet by mouth every 4 hours 7/16/19   Mare Days, MD   gabapentin (NEURONTIN) 300 MG capsule Take 1 capsule by mouth 3 times daily for 7 days. 19 Nain, MD       Current medications:    No current facility-administered medications for this visit. Current Outpatient Medications   Medication Sig Dispense Refill    sennosides-docusate sodium (SENOKOT-S) 8.6-50 MG tablet Take 2 tablets by mouth daily as needed for Constipation 7 tablet 0    acetaminophen (TYLENOL) 500 MG tablet Take 2 tablets by mouth every 6 hours as needed for Pain 28 tablet 0    ibuprofen (ADVIL;MOTRIN) 400 MG tablet Take 1 tablet by mouth every 4 hours 120 tablet 3    gabapentin (NEURONTIN) 300 MG capsule Take 1 capsule by mouth 3 times daily for 7 days.  21 capsule 0     Facility-Administered Medications Ordered in Other Visits   Medication Dose Route Frequency Provider Last Rate Last Dose    calcium gluconate 1 g in dextrose 5 % 100 mL IVPB  1 g Intravenous Once Fontanetsharlene Spencer DO        potassium chloride (KLOR-CON) packet 20 mEq  20 mEq Per NG tube BID Raúl Patino DO   Stopped at 19 0901    fentaNYL 20 mcg/mL Infusion  25 mcg/hr Intravenous Continuous La Castor, APRN - CNP   Stopped at 07/24/19 0541    fentaNYL (SUBLIMAZE) injection 100 mcg  100 mcg Intravenous Q1H PRN La Castor, APRN - CNP   100 mcg at 07/24/19 0155    oxyCODONE (ROXICODONE) immediate release tablet 10 mg  10 mg Oral Q4H La Castor, APRN - CNP   10 mg at 07/24/19 0900    cloNIDine (CATAPRES) tablet 0.2 mg  0.2 mg Per NG tube Q8H La Castor, APRN - CNP   Stopped at 07/23/19 1850    diazepam (VALIUM) tablet 10 mg  10 mg Oral Q6H La Castor, APRN - CNP   10 mg at 07/24/19 0552    dexmedetomidine (PRECEDEX) 400 mcg in sodium chloride 0.9 % 100 mL infusion  0.2 mcg/kg/hr Intravenous Continuous La Castor, APRN - CNP 13 mL/hr at 07/24/19 1017 0.6 mcg/kg/hr at 07/24/19 1017    vitamin B-1 (THIAMINE) tablet 100 mg  100 mg Oral TID La Castor, APRN - CNP   100 mg at 07/24/19 0901    gabapentin (NEURONTIN) capsule 600 mg  600 mg Oral Q8H La Castor, APRN - CNP   600 mg at 07/24/19 0901    metoclopramide (REGLAN) tablet 10 mg  10 mg Oral Q6H La Castor, APRN - CNP   Stopped at 07/24/19 0046    propranolol (INDERAL) tablet 10 mg  10 mg Oral Q8H [place] Britain, DO   10 mg at 07/24/19 0901    QUEtiapine (SEROQUEL) tablet 100 mg  100 mg Oral BID Ameena Crest, DO   100 mg at 07/24/19 0901    0.9 % sodium chloride bolus  250 mL Intravenous Once Ameena Crest, DO        sodium chloride flush 0.9 % injection 10 mL  10 mL Intravenous 2 times per day Sheila Harper, DO   10 mL at 07/24/19 0918    sodium chloride flush 0.9 % injection 10 mL  10 mL Intravenous PRN Sheila Joselo, DO        lidocaine PF 4 % injection 4 mL  4 mL Inhalation As Directed RT PRN Ameena Crest, DO        0.9 % sodium chloride bolus  250 mL Intravenous Once Ameena Rosado DO   Stopped at 38/88/13 0497    folic acid (FOLVITE) tablet 1 mg  1 mg Oral Daily Jennifer Alexandra MD   1 mg at 07/24/19 0902    multivitamin 1 tablet  1 tablet Oral Daily

## 2019-07-25 ENCOUNTER — APPOINTMENT (OUTPATIENT)
Dept: GENERAL RADIOLOGY | Age: 59
DRG: 957 | End: 2019-07-25
Payer: COMMERCIAL

## 2019-07-25 LAB
ABSOLUTE EOS #: 0 K/UL (ref 0–0.4)
ABSOLUTE IMMATURE GRANULOCYTE: 0 K/UL (ref 0–0.3)
ABSOLUTE LYMPH #: 0.92 K/UL (ref 1–4.8)
ABSOLUTE MONO #: 0.92 K/UL (ref 0.1–0.8)
ALLEN TEST: ABNORMAL
ALLEN TEST: ABNORMAL
ALLEN TEST: POSITIVE
ANION GAP SERPL CALCULATED.3IONS-SCNC: 17 MMOL/L (ref 9–17)
BASOPHILS # BLD: 0 % (ref 0–2)
BASOPHILS ABSOLUTE: 0 K/UL (ref 0–0.2)
BUN BLDV-MCNC: 25 MG/DL (ref 6–20)
BUN/CREAT BLD: ABNORMAL (ref 9–20)
CALCIUM SERPL-MCNC: 8.8 MG/DL (ref 8.6–10.4)
CHLORIDE BLD-SCNC: 95 MMOL/L (ref 98–107)
CO2: 25 MMOL/L (ref 20–31)
CREAT SERPL-MCNC: 0.91 MG/DL (ref 0.7–1.2)
CULTURE: NORMAL
DIFFERENTIAL TYPE: ABNORMAL
EOSINOPHILS RELATIVE PERCENT: 0 % (ref 1–4)
FIO2: 30
FIO2: 30
FIO2: ABNORMAL
GFR AFRICAN AMERICAN: >60 ML/MIN
GFR NON-AFRICAN AMERICAN: >60 ML/MIN
GFR SERPL CREATININE-BSD FRML MDRD: ABNORMAL ML/MIN/{1.73_M2}
GFR SERPL CREATININE-BSD FRML MDRD: ABNORMAL ML/MIN/{1.73_M2}
GLUCOSE BLD-MCNC: 147 MG/DL (ref 75–110)
GLUCOSE BLD-MCNC: 151 MG/DL (ref 75–110)
GLUCOSE BLD-MCNC: 167 MG/DL (ref 75–110)
GLUCOSE BLD-MCNC: 172 MG/DL (ref 70–99)
GLUCOSE BLD-MCNC: 173 MG/DL (ref 75–110)
HCT VFR BLD CALC: 24.8 % (ref 40.7–50.3)
HEMOGLOBIN: 8.1 G/DL (ref 13–17)
IMMATURE GRANULOCYTES: 0 %
LYMPHOCYTES # BLD: 5 % (ref 24–44)
Lab: NORMAL
MAGNESIUM: 1.8 MG/DL (ref 1.6–2.6)
MCH RBC QN AUTO: 30.8 PG (ref 25.2–33.5)
MCHC RBC AUTO-ENTMCNC: 32.7 G/DL (ref 28.4–34.8)
MCV RBC AUTO: 94.3 FL (ref 82.6–102.9)
MODE: ABNORMAL
MONOCYTES # BLD: 5 % (ref 1–7)
MORPHOLOGY: NORMAL
MRSA, DNA, NASAL: NORMAL
NEGATIVE BASE EXCESS, ART: ABNORMAL (ref 0–2)
NRBC AUTOMATED: 0 PER 100 WBC
O2 DEVICE/FLOW/%: ABNORMAL
PATIENT TEMP: ABNORMAL
PDW BLD-RTO: 13.9 % (ref 11.8–14.4)
PHOSPHORUS: 3.1 MG/DL (ref 2.5–4.5)
PLATELET # BLD: 513 K/UL (ref 138–453)
PLATELET ESTIMATE: ABNORMAL
PMV BLD AUTO: 10.1 FL (ref 8.1–13.5)
POC HCO3: 26.3 MMOL/L (ref 21–28)
POC HCO3: 28.3 MMOL/L (ref 21–28)
POC HCO3: 32.8 MMOL/L (ref 21–28)
POC LACTIC ACID: 1.47 MMOL/L (ref 0.56–1.39)
POC LACTIC ACID: 1.85 MMOL/L (ref 0.56–1.39)
POC LACTIC ACID: 2.68 MMOL/L (ref 0.56–1.39)
POC O2 SATURATION: 98 % (ref 94–98)
POC O2 SATURATION: 99 % (ref 94–98)
POC O2 SATURATION: 99 % (ref 94–98)
POC PCO2 TEMP: ABNORMAL MM HG
POC PCO2: 32.4 MM HG (ref 35–48)
POC PCO2: 36.4 MM HG (ref 35–48)
POC PCO2: 44.6 MM HG (ref 35–48)
POC PH TEMP: ABNORMAL
POC PH: 7.47 (ref 7.35–7.45)
POC PH: 7.5 (ref 7.35–7.45)
POC PH: 7.52 (ref 7.35–7.45)
POC PO2 TEMP: ABNORMAL MM HG
POC PO2: 101.2 MM HG (ref 83–108)
POC PO2: 111.5 MM HG (ref 83–108)
POC PO2: 117.4 MM HG (ref 83–108)
POSITIVE BASE EXCESS, ART: 3 (ref 0–3)
POSITIVE BASE EXCESS, ART: 5 (ref 0–3)
POSITIVE BASE EXCESS, ART: 8 (ref 0–3)
POTASSIUM SERPL-SCNC: 4.3 MMOL/L (ref 3.7–5.3)
RBC # BLD: 2.63 M/UL (ref 4.21–5.77)
RBC # BLD: ABNORMAL 10*6/UL
SAMPLE SITE: ABNORMAL
SEG NEUTROPHILS: 90 % (ref 36–66)
SEGMENTED NEUTROPHILS ABSOLUTE COUNT: 16.46 K/UL (ref 1.8–7.7)
SODIUM BLD-SCNC: 137 MMOL/L (ref 135–144)
SPECIMEN DESCRIPTION: NORMAL
SPECIMEN DESCRIPTION: NORMAL
TCO2 (CALC), ART: 27 MMOL/L (ref 22–29)
TCO2 (CALC), ART: 30 MMOL/L (ref 22–29)
TCO2 (CALC), ART: 34 MMOL/L (ref 22–29)
WBC # BLD: 18.3 K/UL (ref 3.5–11.3)
WBC # BLD: ABNORMAL 10*3/UL

## 2019-07-25 PROCEDURE — 6360000002 HC RX W HCPCS: Performed by: STUDENT IN AN ORGANIZED HEALTH CARE EDUCATION/TRAINING PROGRAM

## 2019-07-25 PROCEDURE — 2000000000 HC ICU R&B

## 2019-07-25 PROCEDURE — 83605 ASSAY OF LACTIC ACID: CPT

## 2019-07-25 PROCEDURE — 87641 MR-STAPH DNA AMP PROBE: CPT

## 2019-07-25 PROCEDURE — 6370000000 HC RX 637 (ALT 250 FOR IP): Performed by: STUDENT IN AN ORGANIZED HEALTH CARE EDUCATION/TRAINING PROGRAM

## 2019-07-25 PROCEDURE — 82803 BLOOD GASES ANY COMBINATION: CPT

## 2019-07-25 PROCEDURE — 2700000000 HC OXYGEN THERAPY PER DAY

## 2019-07-25 PROCEDURE — 73000 X-RAY EXAM OF COLLAR BONE: CPT

## 2019-07-25 PROCEDURE — 94003 VENT MGMT INPAT SUBQ DAY: CPT

## 2019-07-25 PROCEDURE — 82947 ASSAY GLUCOSE BLOOD QUANT: CPT

## 2019-07-25 PROCEDURE — 2580000003 HC RX 258: Performed by: STUDENT IN AN ORGANIZED HEALTH CARE EDUCATION/TRAINING PROGRAM

## 2019-07-25 PROCEDURE — 36600 WITHDRAWAL OF ARTERIAL BLOOD: CPT

## 2019-07-25 PROCEDURE — 94762 N-INVAS EAR/PLS OXIMTRY CONT: CPT

## 2019-07-25 PROCEDURE — 94770 HC ETCO2 MONITOR DAILY: CPT

## 2019-07-25 PROCEDURE — 84100 ASSAY OF PHOSPHORUS: CPT

## 2019-07-25 PROCEDURE — 80048 BASIC METABOLIC PNL TOTAL CA: CPT

## 2019-07-25 PROCEDURE — 85025 COMPLETE CBC W/AUTO DIFF WBC: CPT

## 2019-07-25 PROCEDURE — 71045 X-RAY EXAM CHEST 1 VIEW: CPT

## 2019-07-25 PROCEDURE — 72170 X-RAY EXAM OF PELVIS: CPT

## 2019-07-25 PROCEDURE — 37799 UNLISTED PX VASCULAR SURGERY: CPT

## 2019-07-25 PROCEDURE — 87081 CULTURE SCREEN ONLY: CPT

## 2019-07-25 PROCEDURE — 36620 INSERTION CATHETER ARTERY: CPT

## 2019-07-25 PROCEDURE — 83735 ASSAY OF MAGNESIUM: CPT

## 2019-07-25 RX ORDER — GABAPENTIN 300 MG/1
900 CAPSULE ORAL EVERY 8 HOURS
Status: DISCONTINUED | OUTPATIENT
Start: 2019-07-25 | End: 2019-07-26

## 2019-07-25 RX ORDER — NICOTINE POLACRILEX 4 MG
15 LOZENGE BUCCAL PRN
Status: DISCONTINUED | OUTPATIENT
Start: 2019-07-25 | End: 2019-08-03

## 2019-07-25 RX ORDER — DEXTROSE MONOHYDRATE 25 G/50ML
12.5 INJECTION, SOLUTION INTRAVENOUS PRN
Status: DISCONTINUED | OUTPATIENT
Start: 2019-07-25 | End: 2019-08-15 | Stop reason: HOSPADM

## 2019-07-25 RX ORDER — MAGNESIUM OXIDE 400 MG/1
400 TABLET ORAL 2 TIMES DAILY
Status: DISCONTINUED | OUTPATIENT
Start: 2019-07-25 | End: 2019-07-25

## 2019-07-25 RX ORDER — DEXTROSE MONOHYDRATE 50 MG/ML
100 INJECTION, SOLUTION INTRAVENOUS PRN
Status: DISCONTINUED | OUTPATIENT
Start: 2019-07-25 | End: 2019-08-15 | Stop reason: HOSPADM

## 2019-07-25 RX ORDER — FUROSEMIDE 10 MG/ML
40 INJECTION INTRAMUSCULAR; INTRAVENOUS ONCE
Status: COMPLETED | OUTPATIENT
Start: 2019-07-25 | End: 2019-07-25

## 2019-07-25 RX ADMIN — FAMOTIDINE 20 MG: 20 TABLET, FILM COATED ORAL at 08:26

## 2019-07-25 RX ADMIN — SODIUM CHLORIDE, PRESERVATIVE FREE 10 ML: 5 INJECTION INTRAVENOUS at 21:47

## 2019-07-25 RX ADMIN — CLONIDINE HYDROCHLORIDE 0.2 MG: 0.2 TABLET ORAL at 05:31

## 2019-07-25 RX ADMIN — PIPERACILLIN SODIUM AND TAZOBACTAM SODIUM 4.5 G: 4; .5 INJECTION, POWDER, LYOPHILIZED, FOR SOLUTION INTRAVENOUS at 08:26

## 2019-07-25 RX ADMIN — Medication 100 MG: at 21:47

## 2019-07-25 RX ADMIN — Medication 100 MG: at 14:37

## 2019-07-25 RX ADMIN — DIAZEPAM 10 MG: 5 TABLET ORAL at 00:34

## 2019-07-25 RX ADMIN — PROPRANOLOL HYDROCHLORIDE 10 MG: 10 TABLET ORAL at 02:00

## 2019-07-25 RX ADMIN — GABAPENTIN 600 MG: 300 CAPSULE ORAL at 00:34

## 2019-07-25 RX ADMIN — ENOXAPARIN SODIUM 30 MG: 100 INJECTION SUBCUTANEOUS at 21:47

## 2019-07-25 RX ADMIN — FAMOTIDINE 20 MG: 20 TABLET, FILM COATED ORAL at 21:47

## 2019-07-25 RX ADMIN — MAGNESIUM GLUCONATE 500 MG ORAL TABLET 400 MG: 500 TABLET ORAL at 11:59

## 2019-07-25 RX ADMIN — FENTANYL CITRATE 100 MCG: 50 INJECTION, SOLUTION INTRAMUSCULAR; INTRAVENOUS at 05:31

## 2019-07-25 RX ADMIN — OXYCODONE HYDROCHLORIDE 10 MG: 5 TABLET ORAL at 00:35

## 2019-07-25 RX ADMIN — PIPERACILLIN SODIUM AND TAZOBACTAM SODIUM 4.5 G: 4; .5 INJECTION, POWDER, LYOPHILIZED, FOR SOLUTION INTRAVENOUS at 00:35

## 2019-07-25 RX ADMIN — OXYCODONE HYDROCHLORIDE 10 MG: 5 TABLET ORAL at 05:32

## 2019-07-25 RX ADMIN — POLYETHYLENE GLYCOL 3350 17 G: 17 POWDER, FOR SOLUTION ORAL at 08:25

## 2019-07-25 RX ADMIN — THERA TABS 1 TABLET: TAB at 08:26

## 2019-07-25 RX ADMIN — SODIUM CHLORIDE, PRESERVATIVE FREE 10 ML: 5 INJECTION INTRAVENOUS at 09:20

## 2019-07-25 RX ADMIN — DIAZEPAM 10 MG: 5 TABLET ORAL at 12:02

## 2019-07-25 RX ADMIN — PIPERACILLIN SODIUM AND TAZOBACTAM SODIUM 4.5 G: 4; .5 INJECTION, POWDER, LYOPHILIZED, FOR SOLUTION INTRAVENOUS at 16:45

## 2019-07-25 RX ADMIN — INSULIN LISPRO 3 UNITS: 100 INJECTION, SOLUTION INTRAVENOUS; SUBCUTANEOUS at 17:13

## 2019-07-25 RX ADMIN — FUROSEMIDE 40 MG: 10 INJECTION, SOLUTION INTRAMUSCULAR; INTRAVENOUS at 12:02

## 2019-07-25 RX ADMIN — Medication 100 MG: at 09:20

## 2019-07-25 RX ADMIN — CYCLOBENZAPRINE 5 MG: 10 TABLET, FILM COATED ORAL at 00:39

## 2019-07-25 RX ADMIN — POTASSIUM CHLORIDE 20 MEQ: 1.5 POWDER, FOR SOLUTION ORAL at 08:25

## 2019-07-25 RX ADMIN — QUETIAPINE FUMARATE 100 MG: 100 TABLET ORAL at 21:47

## 2019-07-25 RX ADMIN — CLONIDINE HYDROCHLORIDE 0.2 MG: 0.2 TABLET ORAL at 12:00

## 2019-07-25 RX ADMIN — INSULIN LISPRO 2 UNITS: 100 INJECTION, SOLUTION INTRAVENOUS; SUBCUTANEOUS at 12:25

## 2019-07-25 RX ADMIN — Medication 100 MCG/HR: at 08:28

## 2019-07-25 RX ADMIN — ACETAMINOPHEN 1000 MG: 500 TABLET ORAL at 12:00

## 2019-07-25 RX ADMIN — OXYCODONE HYDROCHLORIDE 10 MG: 5 TABLET ORAL at 12:02

## 2019-07-25 RX ADMIN — GABAPENTIN 600 MG: 300 CAPSULE ORAL at 08:26

## 2019-07-25 RX ADMIN — PROPRANOLOL HYDROCHLORIDE 10 MG: 10 TABLET ORAL at 17:01

## 2019-07-25 RX ADMIN — ENOXAPARIN SODIUM 30 MG: 100 INJECTION SUBCUTANEOUS at 08:25

## 2019-07-25 RX ADMIN — MAGNESIUM GLUCONATE 500 MG ORAL TABLET 400 MG: 500 TABLET ORAL at 21:47

## 2019-07-25 RX ADMIN — QUETIAPINE FUMARATE 100 MG: 100 TABLET ORAL at 08:25

## 2019-07-25 RX ADMIN — Medication 10 ML: at 09:15

## 2019-07-25 RX ADMIN — METOCLOPRAMIDE 10 MG: 10 TABLET ORAL at 05:31

## 2019-07-25 RX ADMIN — CLONIDINE HYDROCHLORIDE 0.2 MG: 0.2 TABLET ORAL at 18:37

## 2019-07-25 RX ADMIN — POTASSIUM CHLORIDE 20 MEQ: 1.5 POWDER, FOR SOLUTION ORAL at 21:47

## 2019-07-25 RX ADMIN — DIAZEPAM 10 MG: 5 TABLET ORAL at 17:01

## 2019-07-25 RX ADMIN — Medication 100 MCG/HR: at 17:27

## 2019-07-25 RX ADMIN — OXYCODONE HYDROCHLORIDE 10 MG: 5 TABLET ORAL at 17:01

## 2019-07-25 RX ADMIN — METOCLOPRAMIDE 10 MG: 10 TABLET ORAL at 08:26

## 2019-07-25 RX ADMIN — DIAZEPAM 10 MG: 5 TABLET ORAL at 05:32

## 2019-07-25 RX ADMIN — CYCLOBENZAPRINE 5 MG: 10 TABLET, FILM COATED ORAL at 17:01

## 2019-07-25 RX ADMIN — CYCLOBENZAPRINE 5 MG: 10 TABLET, FILM COATED ORAL at 08:26

## 2019-07-25 RX ADMIN — OXYCODONE HYDROCHLORIDE 10 MG: 5 TABLET ORAL at 21:47

## 2019-07-25 RX ADMIN — INSULIN LISPRO 2 UNITS: 100 INJECTION, SOLUTION INTRAVENOUS; SUBCUTANEOUS at 21:55

## 2019-07-25 RX ADMIN — DOCUSATE SODIUM 100 MG: 50 LIQUID ORAL at 12:00

## 2019-07-25 RX ADMIN — FOLIC ACID 1 MG: 1 TABLET ORAL at 08:26

## 2019-07-25 RX ADMIN — GABAPENTIN 900 MG: 300 CAPSULE ORAL at 17:01

## 2019-07-25 RX ADMIN — ACETAMINOPHEN 1000 MG: 500 TABLET ORAL at 05:32

## 2019-07-25 RX ADMIN — ACETAMINOPHEN 1000 MG: 500 TABLET ORAL at 18:37

## 2019-07-25 RX ADMIN — VANCOMYCIN HYDROCHLORIDE 1500 MG: 10 INJECTION, POWDER, LYOPHILIZED, FOR SOLUTION INTRAVENOUS at 05:17

## 2019-07-25 ASSESSMENT — PULMONARY FUNCTION TESTS
PIF_VALUE: 14
PIF_VALUE: 17
PIF_VALUE: 22
PIF_VALUE: 30
PIF_VALUE: 23
PIF_VALUE: 26
PIF_VALUE: 30
PIF_VALUE: 15
PIF_VALUE: 22
PIF_VALUE: 26
PIF_VALUE: 18
PIF_VALUE: 36
PIF_VALUE: 19
PIF_VALUE: 25
PIF_VALUE: 22
PIF_VALUE: 19
PIF_VALUE: 24
PIF_VALUE: 25
PIF_VALUE: 26
PIF_VALUE: 20
PIF_VALUE: 26
PIF_VALUE: 18
PIF_VALUE: 30
PIF_VALUE: 30
PIF_VALUE: 26
PIF_VALUE: 19
PIF_VALUE: 22
PIF_VALUE: 37
PIF_VALUE: 24
PIF_VALUE: 18
PIF_VALUE: 19
PIF_VALUE: 18
PIF_VALUE: 15
PIF_VALUE: 30
PIF_VALUE: 37
PIF_VALUE: 26
PIF_VALUE: 20
PIF_VALUE: 19
PIF_VALUE: 26
PIF_VALUE: 24
PIF_VALUE: 18
PIF_VALUE: 30
PIF_VALUE: 22
PIF_VALUE: 26
PIF_VALUE: 18
PIF_VALUE: 30
PIF_VALUE: 21
PIF_VALUE: 19
PIF_VALUE: 24
PIF_VALUE: 26
PIF_VALUE: 15
PIF_VALUE: 30
PIF_VALUE: 20
PIF_VALUE: 30
PIF_VALUE: 22

## 2019-07-25 ASSESSMENT — PAIN SCALES - GENERAL: PAINLEVEL_OUTOF10: 0

## 2019-07-25 NOTE — PLAN OF CARE
RN  Outcome: Ongoing     Problem: Injury - Risk of, Physical Injury:  Goal: Will remain free from falls  Description  Will remain free from falls   7/25/2019 1831 by Eladio Ferreira RN  Outcome: Ongoing  7/25/2019 0820 by Priya Torrez RN  Outcome: Ongoing  Goal: Absence of physical injury  Description  Absence of physical injury   7/25/2019 1831 by Eladio Ferreira RN  Outcome: Ongoing  7/25/2019 0820 by Priya Torrez RN  Outcome: Ongoing     Problem: Mood - Altered:  Goal: Mood stable  Description  Mood stable  7/25/2019 1831 by Eladio Ferreira RN  Outcome: Ongoing  7/25/2019 0820 by Priya Torrez RN  Outcome: Ongoing  Goal: Absence of abusive behavior  Description  Absence of abusive behavior  7/25/2019 1831 by Eladio Ferreira RN  Outcome: Ongoing  7/25/2019 0820 by Priya Torrez RN  Outcome: Ongoing  Goal: Verbalizations of feeling emotionally comfortable while being cared for will increase  Description  Verbalizations of feeling emotionally comfortable while being cared for will increase  7/25/2019 1831 by Eladio Ferreira RN  Outcome: Ongoing  7/25/2019 0820 by Priya Torrez RN  Outcome: Ongoing     Problem: Psychomotor Activity - Altered:  Goal: Absence of psychomotor disturbance signs and symptoms  Description  Absence of psychomotor disturbance signs and symptoms  7/25/2019 1831 by Eladio Ferreira RN  Outcome: Ongoing  7/25/2019 0820 by Priya Torrez RN  Outcome: Ongoing     Problem: Sensory Perception - Impaired:  Goal: Demonstrations of improved sensory functioning will increase  Description  Demonstrations of improved sensory functioning will increase  7/25/2019 1831 by Eladio Ferreira RN  Outcome: Ongoing  7/25/2019 0820 by Priya Torrez RN  Outcome: Ongoing  Goal: Decrease in sensory misperception frequency  Description  Decrease in sensory misperception frequency  7/25/2019 1831 by Eladio Ferreira RN  Outcome: Ongoing  7/25/2019 0820 by Priya Torrez RN  Outcome: Ongoing  Goal: Able to Ongoing  7/25/2019 0820 by Nika Flynn RN  Outcome: Ongoing  Goal: ET tube will be managed safely  7/25/2019 1831 by Miko Shen RN  Outcome: Ongoing  7/25/2019 0820 by Nika Flynn RN  Outcome: Ongoing     Problem: SKIN INTEGRITY  Goal: Skin integrity is maintained or improved  7/25/2019 1831 by Miko Shen RN  Outcome: Ongoing  7/25/2019 0820 by Nika Flynn RN  Outcome: Ongoing     Problem: Nutrition  Goal: Optimal nutrition therapy  7/25/2019 1831 by Miko Shen RN  Outcome: Ongoing  7/25/2019 0820 by Nika Flynn RN  Outcome: Ongoing     Problem: Restraint Use - Nonviolent/Non-Self-Destructive Behavior  Goal: Absence of restraint-related injury  7/25/2019 1831 by Miko Shen RN  Outcome: Ongoing  7/25/2019 0820 by Nika Flynn RN  Outcome: Ongoing  Goal: Absence of restraint indications  7/25/2019 1831 by Miko Shen RN  Outcome: Ongoing  7/25/2019 0820 by Nika Flynn RN  Outcome: Ongoing     Problem: Restraint Use - Nonviolent/Non-Self-Destructive Behavior:  Goal: Absence of restraint indications  Description  Absence of restraint indications  7/25/2019 1831 by Miko Shen RN  Outcome: Ongoing  7/25/2019 0820 by Nika Flynn RN  Outcome: Not Met This Shift  Goal: Absence of restraint-related injury  Description  Absence of restraint-related injury  7/25/2019 1831 by Miko Shen RN  Outcome: Ongoing  7/25/2019 0820 by Nika Flynn RN  Outcome: Not Met This Shift

## 2019-07-25 NOTE — PROGRESS NOTES
ICU PROGRESS NOTE        PATIENT NAME: Kriss [facility] RECORD NO. [id number]  DATE: 7/25/2019    PRIMARY CARE PHYSICIAN: Jaron Amaro MD    HD: # 10    ASSESSMENT    Patient Active Problem List   Diagnosis    Fall    Closed fracture of multiple ribs of right side    Contusion of right lung    Fracture of transverse process of thoracic vertebra ([facility] [address]    Hemothorax on right    Pneumothorax    Closed fracture of right iliac wing (HCC)    Hypokalemia    Fx dorsal vertebra-closed ([facility] [address]    Traumatic closed fracture of distal clavicle with minimal displacement, left, initial encounter       MEDICAL DECISION MAKING AND PLAN     1. Neuro  1. Pain control: flexeril, cherie 900mg Q8H, tylenol mayra 10mg Q4H, d/c motrin  2. Fent gtt 100mcg/hr  3. Seroquel 100mg BID  4. R T2-T4,6,9 TP fx  2. NS cleared CTLS, HOB 30 degrees, TLSO brace for comfort  5. Acute alcohol withdrawal prior to intubation  2. Thiamine & Folate  3. Valium 10mg Q6H  4. Propranolol 10mg Q8H  5. Clonidine 0.2mg Q8H  2. CV  1. Current vitals: HR 83, /62 (83) on art line, 115/73 on cuff, 99% SpO2, EtCO2: 34  2. KOJ: 80-01  1. Adequate, continue to monitor  3. HR: 79-84  4. SBP:   5. Lactate: 2.68 (1.28)  6. Arterial line in place and correlating well with BP cuff     3. Pulm  1. R 1st-12th rib fractures, R small hemo/pneumo, pulm contusion  2. [facility] vent:  1. ABG 7.51/32/117/26/99%  2. PF: 330 (316)  1. APRV: 30/0, 5/0.5, 30%  3. Weaning parameters:   Mode: APRV, FiO2 30%   While maintaining SpO2 > 88%. ..   1. Decrease to 26/0, 5/0.5 for 2 hours, then   2. Decrease to 18/0, 5/0.5 for 2 hours, then   3. Obtain ABG. (Notify physician). If PF ratio > 250, proceed to. ..    1. 16/0, 7/0.5 for 2 hours    2. 14/0, 9/0.5 for 2 hours    3. 12/0, 10/0.5 for 2 hours    4. Obtain ABG.  (Notify Physician) If PF ratio > 250, proceed to CPAP trial 10/10  *notify physician if pt fails, ABG results, and if pt tolerates CPAP for 2 hours  *If pt fails, return to immediately preceeding parameters prior to failure  *notify physician if pt fails  *no weaning after 1800. Either keep on CPAP overnight or remain at that current vent setting.        4. Renal  1. BUN:Cr    25/0.91  2. K 4.3 - replaced  3. Phos: 3.1  4. Ma.8- replaced  5. UOP: 1900cc  6. Strict I/O  7. Woodall in place  8. Lasix 40mg x 1  1. Goal:  -900mL deficit out today     5. Heme  1. S/p 2uPRBC   2. Hgb 8.1 (8.6)     6. GI  1. TF at goal 55  2. Pepcid, Colace, Mag-Ox  3. DC reglan  4. BM x 1 ()     7. ID:  1. Tmax 37.1  2. CUO 58.2 (17.7)  3. MRSA swab negative  4. Clinically suspected PNA  1. Vanc 1500mg BID  1. DC today, MRSA swab negative  2. Zosyn ER 4.5mg TID x 8 days (end )     8. Endo:  1. Glucose 120-170  2. HDSS started      9. Tubes/Lines  1. Anterior CT #1: anterior of lung, straight 32 [address], suction  1. Small airleak (1), 0mL out  2. Middle CT #2: At base of diaphragm, curved 28 [place], suction  1. 257mL blood out  3. Posterior CT #3: Posterior of lung, straight 32 [place], suction  1. 115mL blood out  1. Right radial art line, poss. replace post OR  2. Woodall  3. PIV  4. NG     10. Prophylaxis  1. Pepcid  2. Pr-Lovenox  3. Thiamine, folate    11. MSK        - R comminuted iliac wing fx and left distal clavicular fx                    -Partial WBAT RLE    12. Dispo  1. ICU  2. Remain intubated  3. Attempt wean, CPAP trial, potential extubation   4. I/O today with lasix: goal - 900mL     CHECKLIST     CAM-ICU RASS: -1 - +1  RESTRAINTS: yes  IVF: kvo  NUTRITION: yes  ANTIBIOTICS: yes, vanc and zosyn  GI: yes  DVT: lovenox  GLYCEMIC CONTROL: HDSS  HOB >45: yes  MOBILITY: bedrest  SBT: wean today, potential     SUBJECTIVE    Acute Events Overnight: SBP low in the 80s-90s, with maps > 65    Samy Cody did well last night, was less agitated than previous nights, able to sleep, satisfactory UOP, afebrile.  Tolerating TF at goal. He continues to be alert and following

## 2019-07-25 NOTE — PLAN OF CARE
Problem: Pain:  Goal: Pain level will decrease  Description  Pain level will decrease   7/25/2019 0820 by Mickey Mcnulty RN  Outcome: Ongoing  7/25/2019 0015 by Radha Lazcano RN  Outcome: Ongoing  7/24/2019 1857 by Shayan Cheney RN  Outcome: Ongoing  Goal: Control of acute pain  Description  Control of acute pain   7/25/2019 0820 by Mickey Mcnulty RN  Outcome: Ongoing  7/25/2019 0015 by Radha Lazcano RN  Outcome: Ongoing  7/24/2019 1857 by Shayan Cheney RN  Outcome: Ongoing  Goal: Control of chronic pain  Description  Control of chronic pain   7/25/2019 0820 by Mickey Mcnulty RN  Outcome: Ongoing  7/25/2019 0015 by Radha Lazcano RN  Outcome: Ongoing     Problem: Falls - Risk of:  Goal: Will remain free from falls  Description  Will remain free from falls   7/25/2019 0820 by Mickey Mcnulty RN  Outcome: Ongoing  7/25/2019 0015 by Radha Lazcano RN  Outcome: Ongoing  7/24/2019 1857 by Shayan Cheney RN  Outcome: Ongoing  Goal: Absence of physical injury  Description  Absence of physical injury   7/25/2019 0820 by Mickey Mcnulty RN  Outcome: Ongoing  7/25/2019 0015 by Radha Lazcano RN  Outcome: Ongoing  7/24/2019 1857 by Shayan Cheney RN  Outcome: Ongoing     Problem: Confusion - Acute:  Goal: Absence of continued neurological deterioration signs and symptoms  Description  Absence of continued neurological deterioration signs and symptoms  7/25/2019 0820 by Mickey Mcnulty RN  Outcome: Ongoing  7/25/2019 0015 by Radha Lazcano RN  Outcome: Ongoing  Goal: Mental status will be restored to baseline  Description  Mental status will be restored to baseline  7/25/2019 0820 by Mickey Mcnulty RN  Outcome: Ongoing  7/25/2019 0015 by Radha Lazcano RN  Outcome: Ongoing     Problem: Discharge Planning:  Goal: Ability to perform activities of daily living will improve  Description  Ability to perform activities of daily living will improve  7/25/2019 0820 by Mickey Mcnulty RN  Outcome: Ongoing  7/25/2019 0015 by Daniel Brooks RN  Outcome: Ongoing  Goal: Participates in care planning  Description  Participates in care planning  7/25/2019 0820 by Toni Fontana RN  Outcome: Ongoing  7/25/2019 0015 by Daniel Brooks RN  Outcome: Ongoing     Problem: Injury - Risk of, Physical Injury:  Goal: Will remain free from falls  Description  Will remain free from falls   7/25/2019 0820 by Toni Fontana RN  Outcome: Ongoing  7/25/2019 0015 by Daniel Brooks RN  Outcome: Ongoing  7/24/2019 1857 by Gaylon Holter, RN  Outcome: Ongoing  Goal: Absence of physical injury  Description  Absence of physical injury   7/25/2019 0820 by Toni Fontana RN  Outcome: Ongoing  7/25/2019 0015 by Daniel Brooks RN  Outcome: Ongoing  7/24/2019 1857 by Gaylon Holter, RN  Outcome: Ongoing     Problem: Mood - Altered:  Goal: Mood stable  Description  Mood stable  7/25/2019 0820 by Toni Fontana RN  Outcome: Ongoing  7/25/2019 0015 by Daniel Brooks RN  Outcome: Ongoing  Goal: Absence of abusive behavior  Description  Absence of abusive behavior  7/25/2019 0820 by Toni Fontana RN  Outcome: Ongoing  7/25/2019 0015 by Daniel Brooks RN  Outcome: Ongoing  Goal: Verbalizations of feeling emotionally comfortable while being cared for will increase  Description  Verbalizations of feeling emotionally comfortable while being cared for will increase  7/25/2019 0820 by Toni Fontana RN  Outcome: Ongoing  7/25/2019 0015 by Daniel Brooks RN  Outcome: Ongoing     Problem: Psychomotor Activity - Altered:  Goal: Absence of psychomotor disturbance signs and symptoms  Description  Absence of psychomotor disturbance signs and symptoms  7/25/2019 0820 by Toni Fontana RN  Outcome: Ongoing  7/25/2019 0015 by Daniel Brooks RN  Outcome: Ongoing     Problem: Sensory Perception - Impaired:  Goal: Demonstrations of improved sensory functioning will increase  Description  Demonstrations of improved sensory functioning will

## 2019-07-25 NOTE — CARE COORDINATION
Pt has surgery yesterday. Has 3 chest tubes, intubated. Plan for weaning trials. Previous referral sent to [facility]. Called their admissions-left  with update.

## 2019-07-26 ENCOUNTER — APPOINTMENT (OUTPATIENT)
Dept: GENERAL RADIOLOGY | Age: 59
DRG: 957 | End: 2019-07-26
Payer: COMMERCIAL

## 2019-07-26 LAB
ABSOLUTE EOS #: 0.08 K/UL (ref 0–0.44)
ABSOLUTE IMMATURE GRANULOCYTE: 0.4 K/UL (ref 0–0.3)
ABSOLUTE LYMPH #: 1.18 K/UL (ref 1.1–3.7)
ABSOLUTE MONO #: 1.67 K/UL (ref 0.1–1.2)
ALLEN TEST: ABNORMAL
ANION GAP SERPL CALCULATED.3IONS-SCNC: 10 MMOL/L (ref 9–17)
BASOPHILS # BLD: 0 % (ref 0–2)
BASOPHILS ABSOLUTE: 0.03 K/UL (ref 0–0.2)
BUN BLDV-MCNC: 27 MG/DL (ref 6–20)
BUN/CREAT BLD: ABNORMAL (ref 9–20)
CALCIUM SERPL-MCNC: 8.3 MG/DL (ref 8.6–10.4)
CHLORIDE BLD-SCNC: 99 MMOL/L (ref 98–107)
CO2: 30 MMOL/L (ref 20–31)
CREAT SERPL-MCNC: 0.91 MG/DL (ref 0.7–1.2)
CULTURE: NORMAL
CULTURE: NORMAL
DIFFERENTIAL TYPE: ABNORMAL
EOSINOPHILS RELATIVE PERCENT: 1 % (ref 1–4)
FIO2: ABNORMAL
GFR AFRICAN AMERICAN: >60 ML/MIN
GFR NON-AFRICAN AMERICAN: >60 ML/MIN
GFR SERPL CREATININE-BSD FRML MDRD: ABNORMAL ML/MIN/{1.73_M2}
GFR SERPL CREATININE-BSD FRML MDRD: ABNORMAL ML/MIN/{1.73_M2}
GLUCOSE BLD-MCNC: 108 MG/DL (ref 75–110)
GLUCOSE BLD-MCNC: 108 MG/DL (ref 75–110)
GLUCOSE BLD-MCNC: 111 MG/DL (ref 70–99)
GLUCOSE BLD-MCNC: 79 MG/DL (ref 75–110)
GLUCOSE BLD-MCNC: 81 MG/DL (ref 75–110)
HCT VFR BLD CALC: 21.8 % (ref 40.7–50.3)
HCT VFR BLD CALC: 25.3 % (ref 40.7–50.3)
HEMOGLOBIN: 6.8 G/DL (ref 13–17)
HEMOGLOBIN: 8.1 G/DL (ref 13–17)
IMMATURE GRANULOCYTES: 3 %
LYMPHOCYTES # BLD: 7 % (ref 24–43)
Lab: NORMAL
Lab: NORMAL
MCH RBC QN AUTO: 30.1 PG (ref 25.2–33.5)
MCHC RBC AUTO-ENTMCNC: 31.2 G/DL (ref 28.4–34.8)
MCV RBC AUTO: 96.5 FL (ref 82.6–102.9)
MODE: ABNORMAL
MONOCYTES # BLD: 10 % (ref 3–12)
NEGATIVE BASE EXCESS, ART: ABNORMAL (ref 0–2)
NRBC AUTOMATED: 0 PER 100 WBC
O2 DEVICE/FLOW/%: ABNORMAL
PATIENT TEMP: ABNORMAL
PDW BLD-RTO: 14.1 % (ref 11.8–14.4)
PLATELET # BLD: 510 K/UL (ref 138–453)
PLATELET ESTIMATE: ABNORMAL
PMV BLD AUTO: 10.2 FL (ref 8.1–13.5)
POC HCO3: 33 MMOL/L (ref 21–28)
POC LACTIC ACID: 1.31 MMOL/L (ref 0.56–1.39)
POC O2 SATURATION: 98 % (ref 94–98)
POC PCO2 TEMP: ABNORMAL MM HG
POC PCO2: 44.7 MM HG (ref 35–48)
POC PH TEMP: ABNORMAL
POC PH: 7.48 (ref 7.35–7.45)
POC PO2 TEMP: ABNORMAL MM HG
POC PO2: 101.1 MM HG (ref 83–108)
POSITIVE BASE EXCESS, ART: 9 (ref 0–3)
POTASSIUM SERPL-SCNC: 3.9 MMOL/L (ref 3.7–5.3)
RBC # BLD: 2.26 M/UL (ref 4.21–5.77)
RBC # BLD: ABNORMAL 10*6/UL
SAMPLE SITE: ABNORMAL
SEG NEUTROPHILS: 79 % (ref 36–65)
SEGMENTED NEUTROPHILS ABSOLUTE COUNT: 12.74 K/UL (ref 1.5–8.1)
SODIUM BLD-SCNC: 139 MMOL/L (ref 135–144)
SPECIMEN DESCRIPTION: NORMAL
SPECIMEN DESCRIPTION: NORMAL
TCO2 (CALC), ART: 34 MMOL/L (ref 22–29)
WBC # BLD: 16.1 K/UL (ref 3.5–11.3)
WBC # BLD: ABNORMAL 10*3/UL

## 2019-07-26 PROCEDURE — 6370000000 HC RX 637 (ALT 250 FOR IP): Performed by: STUDENT IN AN ORGANIZED HEALTH CARE EDUCATION/TRAINING PROGRAM

## 2019-07-26 PROCEDURE — 82947 ASSAY GLUCOSE BLOOD QUANT: CPT

## 2019-07-26 PROCEDURE — P9016 RBC LEUKOCYTES REDUCED: HCPCS

## 2019-07-26 PROCEDURE — 86900 BLOOD TYPING SEROLOGIC ABO: CPT

## 2019-07-26 PROCEDURE — 2580000003 HC RX 258: Performed by: STUDENT IN AN ORGANIZED HEALTH CARE EDUCATION/TRAINING PROGRAM

## 2019-07-26 PROCEDURE — 94003 VENT MGMT INPAT SUBQ DAY: CPT

## 2019-07-26 PROCEDURE — 82803 BLOOD GASES ANY COMBINATION: CPT

## 2019-07-26 PROCEDURE — 37799 UNLISTED PX VASCULAR SURGERY: CPT

## 2019-07-26 PROCEDURE — 83605 ASSAY OF LACTIC ACID: CPT

## 2019-07-26 PROCEDURE — 36430 TRANSFUSION BLD/BLD COMPNT: CPT

## 2019-07-26 PROCEDURE — 85018 HEMOGLOBIN: CPT

## 2019-07-26 PROCEDURE — 2700000000 HC OXYGEN THERAPY PER DAY

## 2019-07-26 PROCEDURE — 80048 BASIC METABOLIC PNL TOTAL CA: CPT

## 2019-07-26 PROCEDURE — 71045 X-RAY EXAM CHEST 1 VIEW: CPT

## 2019-07-26 PROCEDURE — 6360000002 HC RX W HCPCS: Performed by: STUDENT IN AN ORGANIZED HEALTH CARE EDUCATION/TRAINING PROGRAM

## 2019-07-26 PROCEDURE — 94761 N-INVAS EAR/PLS OXIMETRY MLT: CPT

## 2019-07-26 PROCEDURE — 85014 HEMATOCRIT: CPT

## 2019-07-26 PROCEDURE — 85025 COMPLETE CBC W/AUTO DIFF WBC: CPT

## 2019-07-26 PROCEDURE — 94770 HC ETCO2 MONITOR DAILY: CPT

## 2019-07-26 PROCEDURE — 2000000000 HC ICU R&B

## 2019-07-26 RX ORDER — GABAPENTIN 250 MG/5ML
900 SOLUTION ORAL EVERY 8 HOURS SCHEDULED
Status: DISCONTINUED | OUTPATIENT
Start: 2019-07-26 | End: 2019-07-27

## 2019-07-26 RX ADMIN — GABAPENTIN 900 MG: 250 SUSPENSION ORAL at 08:50

## 2019-07-26 RX ADMIN — OXYCODONE HYDROCHLORIDE 10 MG: 5 TABLET ORAL at 05:13

## 2019-07-26 RX ADMIN — PROPRANOLOL HYDROCHLORIDE 10 MG: 10 TABLET ORAL at 16:43

## 2019-07-26 RX ADMIN — Medication 240 ML: at 17:23

## 2019-07-26 RX ADMIN — OXYCODONE HYDROCHLORIDE 10 MG: 5 TABLET ORAL at 16:44

## 2019-07-26 RX ADMIN — OXYCODONE HYDROCHLORIDE 10 MG: 5 TABLET ORAL at 00:12

## 2019-07-26 RX ADMIN — FOLIC ACID 1 MG: 1 TABLET ORAL at 09:09

## 2019-07-26 RX ADMIN — ENOXAPARIN SODIUM 30 MG: 100 INJECTION SUBCUTANEOUS at 09:07

## 2019-07-26 RX ADMIN — SODIUM CHLORIDE, PRESERVATIVE FREE 10 ML: 5 INJECTION INTRAVENOUS at 09:08

## 2019-07-26 RX ADMIN — ACETAMINOPHEN 1000 MG: 500 TABLET ORAL at 20:50

## 2019-07-26 RX ADMIN — POLYETHYLENE GLYCOL 3350 17 G: 17 POWDER, FOR SOLUTION ORAL at 09:07

## 2019-07-26 RX ADMIN — CLONIDINE HYDROCHLORIDE 0.2 MG: 0.2 TABLET ORAL at 20:50

## 2019-07-26 RX ADMIN — FENTANYL CITRATE 100 MCG: 50 INJECTION, SOLUTION INTRAMUSCULAR; INTRAVENOUS at 15:23

## 2019-07-26 RX ADMIN — PIPERACILLIN SODIUM AND TAZOBACTAM SODIUM 4.5 G: 4; .5 INJECTION, POWDER, LYOPHILIZED, FOR SOLUTION INTRAVENOUS at 00:13

## 2019-07-26 RX ADMIN — QUETIAPINE FUMARATE 100 MG: 100 TABLET ORAL at 09:07

## 2019-07-26 RX ADMIN — THERA TABS 1 TABLET: TAB at 09:10

## 2019-07-26 RX ADMIN — OXYCODONE HYDROCHLORIDE 10 MG: 5 TABLET ORAL at 20:50

## 2019-07-26 RX ADMIN — ENOXAPARIN SODIUM 30 MG: 100 INJECTION SUBCUTANEOUS at 20:49

## 2019-07-26 RX ADMIN — MAGNESIUM GLUCONATE 500 MG ORAL TABLET 400 MG: 500 TABLET ORAL at 09:10

## 2019-07-26 RX ADMIN — PIPERACILLIN SODIUM AND TAZOBACTAM SODIUM 4.5 G: 4; .5 INJECTION, POWDER, LYOPHILIZED, FOR SOLUTION INTRAVENOUS at 08:58

## 2019-07-26 RX ADMIN — CYCLOBENZAPRINE 5 MG: 10 TABLET, FILM COATED ORAL at 16:43

## 2019-07-26 RX ADMIN — CLONIDINE HYDROCHLORIDE 0.2 MG: 0.2 TABLET ORAL at 03:11

## 2019-07-26 RX ADMIN — GABAPENTIN 900 MG: 250 SUSPENSION ORAL at 16:43

## 2019-07-26 RX ADMIN — FAMOTIDINE 20 MG: 20 TABLET, FILM COATED ORAL at 09:10

## 2019-07-26 RX ADMIN — Medication 10 ML: at 09:09

## 2019-07-26 RX ADMIN — FENTANYL CITRATE 100 MCG: 50 INJECTION, SOLUTION INTRAMUSCULAR; INTRAVENOUS at 14:09

## 2019-07-26 RX ADMIN — DIAZEPAM 10 MG: 5 TABLET ORAL at 12:05

## 2019-07-26 RX ADMIN — DIAZEPAM 10 MG: 5 TABLET ORAL at 00:12

## 2019-07-26 RX ADMIN — DIAZEPAM 10 MG: 5 TABLET ORAL at 05:13

## 2019-07-26 RX ADMIN — Medication 100 MG: at 20:49

## 2019-07-26 RX ADMIN — CYCLOBENZAPRINE 5 MG: 10 TABLET, FILM COATED ORAL at 00:11

## 2019-07-26 RX ADMIN — CLONIDINE HYDROCHLORIDE 0.2 MG: 0.2 TABLET ORAL at 12:05

## 2019-07-26 RX ADMIN — Medication 100 MCG/HR: at 02:28

## 2019-07-26 RX ADMIN — Medication 100 MG: at 09:07

## 2019-07-26 RX ADMIN — POTASSIUM CHLORIDE 20 MEQ: 1.5 POWDER, FOR SOLUTION ORAL at 20:49

## 2019-07-26 RX ADMIN — SODIUM CHLORIDE, POTASSIUM CHLORIDE, SODIUM LACTATE AND CALCIUM CHLORIDE: 600; 310; 30; 20 INJECTION, SOLUTION INTRAVENOUS at 05:08

## 2019-07-26 RX ADMIN — DIAZEPAM 10 MG: 5 TABLET ORAL at 16:43

## 2019-07-26 RX ADMIN — CYCLOBENZAPRINE 5 MG: 10 TABLET, FILM COATED ORAL at 09:07

## 2019-07-26 RX ADMIN — PROPRANOLOL HYDROCHLORIDE 10 MG: 10 TABLET ORAL at 00:11

## 2019-07-26 RX ADMIN — QUETIAPINE FUMARATE 100 MG: 100 TABLET ORAL at 20:49

## 2019-07-26 RX ADMIN — DOCUSATE SODIUM 100 MG: 50 LIQUID ORAL at 09:09

## 2019-07-26 RX ADMIN — MAGNESIUM GLUCONATE 500 MG ORAL TABLET 400 MG: 500 TABLET ORAL at 20:49

## 2019-07-26 RX ADMIN — ACETAMINOPHEN 1000 MG: 500 TABLET ORAL at 03:11

## 2019-07-26 RX ADMIN — Medication 100 MG: at 16:43

## 2019-07-26 RX ADMIN — PROPRANOLOL HYDROCHLORIDE 10 MG: 10 TABLET ORAL at 09:07

## 2019-07-26 RX ADMIN — ACETAMINOPHEN 1000 MG: 500 TABLET ORAL at 12:05

## 2019-07-26 RX ADMIN — POTASSIUM CHLORIDE 20 MEQ: 1.5 POWDER, FOR SOLUTION ORAL at 09:07

## 2019-07-26 RX ADMIN — OXYCODONE HYDROCHLORIDE 10 MG: 5 TABLET ORAL at 12:05

## 2019-07-26 RX ADMIN — GABAPENTIN 900 MG: 300 CAPSULE ORAL at 00:13

## 2019-07-26 RX ADMIN — PIPERACILLIN SODIUM AND TAZOBACTAM SODIUM 4.5 G: 4; .5 INJECTION, POWDER, LYOPHILIZED, FOR SOLUTION INTRAVENOUS at 16:51

## 2019-07-26 ASSESSMENT — PULMONARY FUNCTION TESTS
PIF_VALUE: 19
PIF_VALUE: 19
PIF_VALUE: 13
PIF_VALUE: 18
PIF_VALUE: 13
PIF_VALUE: 19
PIF_VALUE: 15
PIF_VALUE: 15
PIF_VALUE: 18
PIF_VALUE: 13
PIF_VALUE: 13
PIF_VALUE: 12
PIF_VALUE: 13
PIF_VALUE: 15
PIF_VALUE: 12
PIF_VALUE: 19
PIF_VALUE: 18
PIF_VALUE: 19
PIF_VALUE: 15
PIF_VALUE: 13
PIF_VALUE: 18
PIF_VALUE: 16
PIF_VALUE: 16
PIF_VALUE: 19
PIF_VALUE: 13
PIF_VALUE: 13
PIF_VALUE: 16
PIF_VALUE: 18
PIF_VALUE: 13
PIF_VALUE: 13
PIF_VALUE: 18
PIF_VALUE: 13

## 2019-07-26 ASSESSMENT — PAIN SCALES - GENERAL
PAINLEVEL_OUTOF10: 9
PAINLEVEL_OUTOF10: 9
PAINLEVEL_OUTOF10: 10
PAINLEVEL_OUTOF10: 9

## 2019-07-26 NOTE — PROGRESS NOTES
Order obtained for extubation. SpO2 of 97 on 30% FiO2. Patient extubated and placed on 3 liters/min via nasal cannula. Post extubation SpO2 is 94% with HR  80 bpm and RR 20 breaths/min. Patient had mild cough that was non-productive. Extubation Well tolerated by patient. .   Breath Sounds: clear    HEATHER S GLADIEUX   1:31 PM

## 2019-07-26 NOTE — PROGRESS NOTES
ICU PROGRESS NOTE        PATIENT NAME: Fili Alonso [facility] RECORD NO. [id number]  DATE: 2019    PRIMARY CARE PHYSICIAN: Santy Verma MD    HD: # 11    ASSESSMENT    Patient Active Problem List   Diagnosis    Fall    Closed fracture of multiple ribs of right side    Contusion of right lung    Fracture of transverse process of thoracic vertebra ([facility] [address]    Hemothorax on right    Pneumothorax    Closed fracture of right iliac wing (HCC)    Hypokalemia    Fx dorsal vertebra-closed ([address] [address]    Traumatic closed fracture of distal clavicle with minimal displacement, left, initial encounter       MEDICAL DECISION MAKING AND PLAN     1. Neuro  1. Pain control: flexeril, cherie 900mg Q8H, tylenol mayra 10mg Q4H, d/c motrin  2. Fent PRN  3. Seroquel 100mg BID  4. R T2-T4,6,9 TP fx  2. NS cleared CTLS, HOB 30 degrees, TLSO brace for comfort  5. Acute alcohol withdrawal prior to intubation  2. Thiamine & Folate  3. Valium 10mg Q6H  4. Propranolol 10mg Q8H  5. Clonidine 0.2mg Q8H  2. CV  1. Current vitals: HR 83, /62 (83) on art line, 115/73 on cuff, 99% SpO2, EtCO2: 34  2. MAP: 71-77  1. Adequate, continue to monitor  3. TS: 16-73  4. [id number]  5. Lactate: 1.31 (2.68)  6. IVC non collapsible, full     3. Pulm  1. R 1st-12th rib fractures, R small hemo/pneumo, pulm contusion  2. Extubated   3. Encourage IS and coughing     4. Renal  1. BUN:Cr    27/0.91  2. UOP: 1290cc  3. DC varela     5. Heme  1. S/p 2uPRBC   2. Hgb 6.8 (8.6)  1. trx 1uPRBC  2. Repeat HH: 8.1     6. GI  1. TF, inc to goal  2. Colace, Mag-Ox  3. DC pepcid, tolerating TF  4. Enema, BM x 2 ()  5. NGT placed      7. ID:  1. Tmax 98.2  2. WBC 16.1 (18.3)  3. MRSA swab negative  4. Clinically suspected PNA  1. Zosyn ER 4.5mg TID x 8 days (end )     8. Endo:  1. Glucose 140-170  2. HDSS, 7 U given     9. Tubes/Lines  1. DC CT #1  2. Middle CT #2: At base of diaphragm, curved 28 [place], suction  1. 440mL blood out  3.  Posterior CT

## 2019-07-26 NOTE — PLAN OF CARE
Patient will maintain patent airway  7/26/2019 0151 by Brandie Andre RN  Outcome: Ongoing  7/25/2019 1831 by Raman Foote RN  Outcome: Ongoing  Goal: Patient will achieve/maintain normal respiratory rate/effort  7/26/2019 0151 by Brandie Andre RN  Outcome: Ongoing  7/25/2019 1831 by Raman Foote RN  Outcome: Ongoing     Problem: MECHANICAL VENTILATION  Goal: Patient will maintain patent airway  7/26/2019 0151 by Brandie Andre RN  Outcome: Ongoing  7/25/2019 1831 by Raman Foote RN  Outcome: Ongoing  Goal: Oral health is maintained or improved  7/26/2019 0151 by Brandie Andre RN  Outcome: Ongoing  7/25/2019 1831 by Raman Foote RN  Outcome: Ongoing  Goal: ET tube will be managed safely  7/26/2019 0151 by Brandie Andre RN  Outcome: Ongoing  7/25/2019 1831 by Raman Foote RN  Outcome: Ongoing     Problem: SKIN INTEGRITY  Goal: Skin integrity is maintained or improved  7/26/2019 0151 by Brandie nAdre RN  Outcome: Ongoing  7/25/2019 1831 by Raman Foote RN  Outcome: Ongoing     Problem: Nutrition  Goal: Optimal nutrition therapy  7/26/2019 0151 by Brandie Andre RN  Outcome: Ongoing  7/25/2019 1831 by Raman Foote RN  Outcome: Ongoing     Problem: Restraint Use - Nonviolent/Non-Self-Destructive Behavior  Goal: Absence of restraint-related injury  7/26/2019 0151 by Brandie Andre RN  Outcome: Ongoing  7/25/2019 1831 by Raman Foote RN  Outcome: Ongoing     Problem: Restraint Use - Nonviolent/Non-Self-Destructive Behavior:  Goal: Absence of restraint-related injury  7/26/2019 0151 by Brandie Andre RN  Outcome: Ongoing  7/25/2019 1831 by Raman Foote RN  Outcome: Ongoing     Problem: Restraint Use - Nonviolent/Non-Self-Destructive Behavior  Goal: Absence of restraint indications  7/26/2019 0151 by Brandie Andre RN  Outcome: Not Met This Shift  7/25/2019 1831 by Raman Foote RN  Outcome: Ongoing     Problem: Restraint Use - Nonviolent/Non-Self-Destructive Behavior:  Goal: Absence of

## 2019-07-27 ENCOUNTER — APPOINTMENT (OUTPATIENT)
Dept: GENERAL RADIOLOGY | Age: 59
DRG: 957 | End: 2019-07-27
Payer: COMMERCIAL

## 2019-07-27 LAB
ABO/RH: NORMAL
ABSOLUTE EOS #: 0.33 K/UL (ref 0–0.44)
ABSOLUTE IMMATURE GRANULOCYTE: 0.66 K/UL (ref 0–0.3)
ABSOLUTE LYMPH #: 1.98 K/UL (ref 1.1–3.7)
ABSOLUTE MONO #: 1.82 K/UL (ref 0.1–1.2)
ANION GAP SERPL CALCULATED.3IONS-SCNC: 11 MMOL/L (ref 9–17)
ANTIBODY SCREEN: NEGATIVE
ARM BAND NUMBER: NORMAL
BASOPHILS # BLD: 1 % (ref 0–2)
BASOPHILS ABSOLUTE: 0.17 K/UL (ref 0–0.2)
BLD PROD TYP BPU: NORMAL
BUN BLDV-MCNC: 23 MG/DL (ref 6–20)
BUN/CREAT BLD: ABNORMAL (ref 9–20)
CALCIUM SERPL-MCNC: 8.8 MG/DL (ref 8.6–10.4)
CHLORIDE BLD-SCNC: 101 MMOL/L (ref 98–107)
CO2: 27 MMOL/L (ref 20–31)
CREAT SERPL-MCNC: 1.07 MG/DL (ref 0.7–1.2)
CROSSMATCH RESULT: NORMAL
DIFFERENTIAL TYPE: ABNORMAL
DISPENSE STATUS BLOOD BANK: NORMAL
EOSINOPHILS RELATIVE PERCENT: 2 % (ref 1–4)
EXPIRATION DATE: NORMAL
GFR AFRICAN AMERICAN: >60 ML/MIN
GFR NON-AFRICAN AMERICAN: >60 ML/MIN
GFR SERPL CREATININE-BSD FRML MDRD: ABNORMAL ML/MIN/{1.73_M2}
GFR SERPL CREATININE-BSD FRML MDRD: ABNORMAL ML/MIN/{1.73_M2}
GLUCOSE BLD-MCNC: 112 MG/DL (ref 70–99)
GLUCOSE BLD-MCNC: 89 MG/DL (ref 75–110)
GLUCOSE BLD-MCNC: 96 MG/DL (ref 75–110)
GLUCOSE BLD-MCNC: 98 MG/DL (ref 75–110)
HCT VFR BLD CALC: 28.5 % (ref 40.7–50.3)
HEMOGLOBIN: 8.9 G/DL (ref 13–17)
IMMATURE GRANULOCYTES: 4 %
LYMPHOCYTES # BLD: 12 % (ref 24–43)
MCH RBC QN AUTO: 30.7 PG (ref 25.2–33.5)
MCHC RBC AUTO-ENTMCNC: 31.2 G/DL (ref 28.4–34.8)
MCV RBC AUTO: 98.3 FL (ref 82.6–102.9)
MONOCYTES # BLD: 11 % (ref 3–12)
MORPHOLOGY: NORMAL
NRBC AUTOMATED: 0.3 PER 100 WBC
PDW BLD-RTO: 14.3 % (ref 11.8–14.4)
PLATELET # BLD: ABNORMAL K/UL (ref 138–453)
PLATELET ESTIMATE: ABNORMAL
PLATELET, FLUORESCENCE: 630 K/UL (ref 138–453)
PLATELET, IMMATURE FRACTION: 2.4 % (ref 1.1–10.3)
PMV BLD AUTO: ABNORMAL FL (ref 8.1–13.5)
POTASSIUM SERPL-SCNC: 4.1 MMOL/L (ref 3.7–5.3)
RBC # BLD: 2.9 M/UL (ref 4.21–5.77)
RBC # BLD: ABNORMAL 10*6/UL
SEG NEUTROPHILS: 70 % (ref 36–65)
SEGMENTED NEUTROPHILS ABSOLUTE COUNT: 11.54 K/UL (ref 1.5–8.1)
SODIUM BLD-SCNC: 139 MMOL/L (ref 135–144)
TRANSFUSION STATUS: NORMAL
UNIT DIVISION: 0
UNIT NUMBER: NORMAL
WBC # BLD: 16.5 K/UL (ref 3.5–11.3)
WBC # BLD: ABNORMAL 10*3/UL

## 2019-07-27 PROCEDURE — 36415 COLL VENOUS BLD VENIPUNCTURE: CPT

## 2019-07-27 PROCEDURE — 6370000000 HC RX 637 (ALT 250 FOR IP): Performed by: STUDENT IN AN ORGANIZED HEALTH CARE EDUCATION/TRAINING PROGRAM

## 2019-07-27 PROCEDURE — 2580000003 HC RX 258: Performed by: STUDENT IN AN ORGANIZED HEALTH CARE EDUCATION/TRAINING PROGRAM

## 2019-07-27 PROCEDURE — 85055 RETICULATED PLATELET ASSAY: CPT

## 2019-07-27 PROCEDURE — 82947 ASSAY GLUCOSE BLOOD QUANT: CPT

## 2019-07-27 PROCEDURE — 6360000002 HC RX W HCPCS: Performed by: STUDENT IN AN ORGANIZED HEALTH CARE EDUCATION/TRAINING PROGRAM

## 2019-07-27 PROCEDURE — 71045 X-RAY EXAM CHEST 1 VIEW: CPT

## 2019-07-27 PROCEDURE — 31720 CLEARANCE OF AIRWAYS: CPT

## 2019-07-27 PROCEDURE — 2060000000 HC ICU INTERMEDIATE R&B

## 2019-07-27 PROCEDURE — 85025 COMPLETE CBC W/AUTO DIFF WBC: CPT

## 2019-07-27 PROCEDURE — 94761 N-INVAS EAR/PLS OXIMETRY MLT: CPT

## 2019-07-27 PROCEDURE — 2700000000 HC OXYGEN THERAPY PER DAY

## 2019-07-27 PROCEDURE — 80048 BASIC METABOLIC PNL TOTAL CA: CPT

## 2019-07-27 RX ORDER — OXYCODONE HYDROCHLORIDE 5 MG/1
5 TABLET ORAL EVERY 6 HOURS
Status: DISCONTINUED | OUTPATIENT
Start: 2019-07-27 | End: 2019-07-30

## 2019-07-27 RX ORDER — CLONIDINE HYDROCHLORIDE 0.1 MG/1
0.1 TABLET ORAL EVERY 8 HOURS
Status: DISCONTINUED | OUTPATIENT
Start: 2019-07-27 | End: 2019-07-30

## 2019-07-27 RX ORDER — ONDANSETRON 2 MG/ML
4 INJECTION INTRAMUSCULAR; INTRAVENOUS ONCE
Status: DISCONTINUED | OUTPATIENT
Start: 2019-07-27 | End: 2019-07-27

## 2019-07-27 RX ORDER — DIAZEPAM 5 MG/1
5 TABLET ORAL EVERY 6 HOURS
Status: DISCONTINUED | OUTPATIENT
Start: 2019-07-27 | End: 2019-07-29

## 2019-07-27 RX ORDER — GABAPENTIN 300 MG/1
900 CAPSULE ORAL 3 TIMES DAILY
Status: DISCONTINUED | OUTPATIENT
Start: 2019-07-27 | End: 2019-08-01

## 2019-07-27 RX ORDER — ONDANSETRON 2 MG/ML
4 INJECTION INTRAMUSCULAR; INTRAVENOUS EVERY 8 HOURS PRN
Status: DISCONTINUED | OUTPATIENT
Start: 2019-07-27 | End: 2019-08-15 | Stop reason: HOSPADM

## 2019-07-27 RX ADMIN — OXYCODONE HYDROCHLORIDE 10 MG: 5 TABLET ORAL at 08:05

## 2019-07-27 RX ADMIN — GABAPENTIN 900 MG: 300 CAPSULE ORAL at 21:40

## 2019-07-27 RX ADMIN — ACETAMINOPHEN 1000 MG: 500 TABLET ORAL at 11:25

## 2019-07-27 RX ADMIN — OXYCODONE HYDROCHLORIDE 10 MG: 5 TABLET ORAL at 00:54

## 2019-07-27 RX ADMIN — DIAZEPAM 10 MG: 5 TABLET ORAL at 05:14

## 2019-07-27 RX ADMIN — DIAZEPAM 5 MG: 5 TABLET ORAL at 11:24

## 2019-07-27 RX ADMIN — GABAPENTIN 900 MG: 250 SUSPENSION ORAL at 00:54

## 2019-07-27 RX ADMIN — ENOXAPARIN SODIUM 30 MG: 100 INJECTION SUBCUTANEOUS at 21:25

## 2019-07-27 RX ADMIN — OXYCODONE HYDROCHLORIDE 10 MG: 5 TABLET ORAL at 05:14

## 2019-07-27 RX ADMIN — Medication 100 MG: at 21:41

## 2019-07-27 RX ADMIN — CYCLOBENZAPRINE 5 MG: 10 TABLET, FILM COATED ORAL at 08:03

## 2019-07-27 RX ADMIN — ENOXAPARIN SODIUM 30 MG: 100 INJECTION SUBCUTANEOUS at 08:02

## 2019-07-27 RX ADMIN — GABAPENTIN 900 MG: 250 SUSPENSION ORAL at 08:02

## 2019-07-27 RX ADMIN — ACETAMINOPHEN 1000 MG: 500 TABLET ORAL at 18:30

## 2019-07-27 RX ADMIN — PROPRANOLOL HYDROCHLORIDE 10 MG: 10 TABLET ORAL at 08:04

## 2019-07-27 RX ADMIN — PIPERACILLIN SODIUM AND TAZOBACTAM SODIUM 4.5 G: 4; .5 INJECTION, POWDER, LYOPHILIZED, FOR SOLUTION INTRAVENOUS at 00:55

## 2019-07-27 RX ADMIN — PROPRANOLOL HYDROCHLORIDE 10 MG: 10 TABLET ORAL at 17:02

## 2019-07-27 RX ADMIN — GABAPENTIN 900 MG: 300 CAPSULE ORAL at 15:40

## 2019-07-27 RX ADMIN — DIAZEPAM 10 MG: 5 TABLET ORAL at 00:54

## 2019-07-27 RX ADMIN — OXYCODONE HYDROCHLORIDE 5 MG: 5 TABLET ORAL at 21:40

## 2019-07-27 RX ADMIN — POTASSIUM CHLORIDE 20 MEQ: 1.5 POWDER, FOR SOLUTION ORAL at 21:41

## 2019-07-27 RX ADMIN — CLONIDINE HYDROCHLORIDE 0.2 MG: 0.2 TABLET ORAL at 03:05

## 2019-07-27 RX ADMIN — DIAZEPAM 5 MG: 5 TABLET ORAL at 18:30

## 2019-07-27 RX ADMIN — QUETIAPINE FUMARATE 100 MG: 100 TABLET ORAL at 21:42

## 2019-07-27 RX ADMIN — ACETAMINOPHEN 1000 MG: 500 TABLET ORAL at 03:05

## 2019-07-27 RX ADMIN — CLONIDINE HYDROCHLORIDE 0.1 MG: 0.2 TABLET ORAL at 18:30

## 2019-07-27 RX ADMIN — ONDANSETRON 4 MG: 2 INJECTION INTRAMUSCULAR; INTRAVENOUS at 21:15

## 2019-07-27 RX ADMIN — OXYCODONE HYDROCHLORIDE 5 MG: 5 TABLET ORAL at 15:00

## 2019-07-27 RX ADMIN — CYCLOBENZAPRINE 5 MG: 10 TABLET, FILM COATED ORAL at 00:54

## 2019-07-27 RX ADMIN — POTASSIUM CHLORIDE 20 MEQ: 1.5 POWDER, FOR SOLUTION ORAL at 08:02

## 2019-07-27 RX ADMIN — MAGNESIUM GLUCONATE 500 MG ORAL TABLET 400 MG: 500 TABLET ORAL at 21:44

## 2019-07-27 RX ADMIN — PIPERACILLIN SODIUM AND TAZOBACTAM SODIUM 4.5 G: 4; .5 INJECTION, POWDER, LYOPHILIZED, FOR SOLUTION INTRAVENOUS at 16:33

## 2019-07-27 RX ADMIN — CLONIDINE HYDROCHLORIDE 0.1 MG: 0.2 TABLET ORAL at 11:25

## 2019-07-27 RX ADMIN — SODIUM CHLORIDE, PRESERVATIVE FREE 10 ML: 5 INJECTION INTRAVENOUS at 21:18

## 2019-07-27 RX ADMIN — Medication 100 MG: at 15:40

## 2019-07-27 RX ADMIN — FOLIC ACID 1 MG: 1 TABLET ORAL at 08:03

## 2019-07-27 RX ADMIN — PIPERACILLIN SODIUM AND TAZOBACTAM SODIUM 4.5 G: 4; .5 INJECTION, POWDER, LYOPHILIZED, FOR SOLUTION INTRAVENOUS at 08:05

## 2019-07-27 RX ADMIN — PROPRANOLOL HYDROCHLORIDE 10 MG: 10 TABLET ORAL at 00:54

## 2019-07-27 RX ADMIN — Medication 100 MG: at 08:03

## 2019-07-27 RX ADMIN — QUETIAPINE FUMARATE 100 MG: 100 TABLET ORAL at 08:02

## 2019-07-27 RX ADMIN — MAGNESIUM GLUCONATE 500 MG ORAL TABLET 400 MG: 500 TABLET ORAL at 08:03

## 2019-07-27 RX ADMIN — CYCLOBENZAPRINE 5 MG: 10 TABLET, FILM COATED ORAL at 17:01

## 2019-07-27 RX ADMIN — THERA TABS 1 TABLET: TAB at 08:03

## 2019-07-27 ASSESSMENT — PAIN SCALES - GENERAL
PAINLEVEL_OUTOF10: 10
PAINLEVEL_OUTOF10: 7
PAINLEVEL_OUTOF10: 8
PAINLEVEL_OUTOF10: 0
PAINLEVEL_OUTOF10: 0
PAINLEVEL_OUTOF10: 7

## 2019-07-27 ASSESSMENT — PAIN DESCRIPTION - LOCATION: LOCATION: RIB CAGE

## 2019-07-27 ASSESSMENT — PAIN DESCRIPTION - PAIN TYPE: TYPE: ACUTE PAIN

## 2019-07-27 ASSESSMENT — PAIN DESCRIPTION - ORIENTATION: ORIENTATION: RIGHT

## 2019-07-27 ASSESSMENT — PAIN DESCRIPTION - DESCRIPTORS: DESCRIPTORS: SORE

## 2019-07-27 ASSESSMENT — PAIN DESCRIPTION - FREQUENCY: FREQUENCY: INTERMITTENT

## 2019-07-27 ASSESSMENT — PAIN SCALES - WONG BAKER: WONGBAKER_NUMERICALRESPONSE: 0

## 2019-07-27 NOTE — PLAN OF CARE
RN  Outcome: Ongoing  7/27/2019 0118 by Ruth Monge RN  Outcome: Ongoing  7/26/2019 1951 by Carla Vasquez RN  Outcome: Ongoing     Problem: Restraint Use - Nonviolent/Non-Self-Destructive Behavior  Goal: Absence of restraint-related injury  7/27/2019 0747 by Rajinder Puente RN  Outcome: Ongoing  7/27/2019 0118 by Ruth Monge RN  Outcome: Ongoing  7/26/2019 1951 by Carla Vasquez RN  Outcome: Ongoing  Goal: Absence of restraint indications  7/27/2019 0747 by Rajinder Puente RN  Outcome: Ongoing  7/27/2019 0118 by Ruth Monge RN  Outcome: Ongoing  7/26/2019 1951 by Carla Vasquez RN  Outcome: Ongoing     Problem: Restraint Use - Nonviolent/Non-Self-Destructive Behavior:  Goal: Absence of restraint-related injury  Description  Absence of restraint-related injury  7/27/2019 0747 by Rajinder Puente RN  Outcome: Ongoing  7/27/2019 0118 by Ruth Monge RN  Outcome: Ongoing  7/26/2019 1951 by Carla Vasquez RN  Outcome: Ongoing

## 2019-07-27 NOTE — PROGRESS NOTES
8. 1* 8.9*   HCT 24.8* 21.8* 25.3* 28.5*   MCV 94.3 96.5  --  98.3   * 510*  --  See Reflexed IPF Result     BMP:   Recent Labs     07/25/19  0649 07/26/19  0652 07/27/19  0803    139 139   K 4.3 3.9 4.1   CL 95* 99 101   CO2 25 30 27   BUN 25* 27* 23*   CREATININE 0.91 0.91 1.07   GLUCOSE 172* 111* 112*         Low Avelar,   7/27/19, 3:06 PM     I personally evaluated the patient and directed the medical decision making with Resident/KAVITHA after the physical/radiologic exam and laboratory values were reviewed and confirmed.  ROBERTO

## 2019-07-27 NOTE — PLAN OF CARE
Problem: OXYGENATION/RESPIRATORY FUNCTION  Goal: Patient will maintain patent airway  7/27/2019 1710 by Jb Kay RCP  Outcome: Completed  7/27/2019 1252 by Natalya Holloway RCP  Outcome: Ongoing  7/27/2019 0747 by Temitope Castro RN  Outcome: Ongoing     Problem: OXYGENATION/RESPIRATORY FUNCTION  Goal: Patient will achieve/maintain normal respiratory rate/effort  Description  Respiratory rate and effort will be within normal limits for the patient  7/27/2019 1710 by Jb Kay RCP  Outcome: Completed  7/27/2019 1252 by Ntaalya Holloway RCP  Outcome: Ongoing  7/27/2019 0747 by Temitope Castro RN  Outcome: Ongoing     Problem: MECHANICAL VENTILATION  Goal: Patient will maintain patent airway  7/27/2019 1710 by Jb Kay RCP  Outcome: Completed  7/27/2019 0747 by Temitope Castro RN  Outcome: Ongoing     Problem: MECHANICAL VENTILATION  Goal: Oral health is maintained or improved  7/27/2019 1710 by Jb Kay RCP  Outcome: Completed  7/27/2019 0747 by Temitope Castro RN  Outcome: Ongoing     Problem: MECHANICAL VENTILATION  Goal: ET tube will be managed safely  7/27/2019 1710 by Jb Kay RCP  Outcome: Completed  7/27/2019 0747 by Temitope Castro RN  Outcome: Ongoing

## 2019-07-28 ENCOUNTER — APPOINTMENT (OUTPATIENT)
Dept: GENERAL RADIOLOGY | Age: 59
DRG: 957 | End: 2019-07-28
Payer: COMMERCIAL

## 2019-07-28 LAB
ABSOLUTE EOS #: 0 K/UL (ref 0–0.4)
ABSOLUTE IMMATURE GRANULOCYTE: 0.74 K/UL (ref 0–0.3)
ABSOLUTE LYMPH #: 2.23 K/UL (ref 1–4.8)
ABSOLUTE MONO #: 2.05 K/UL (ref 0.1–0.8)
ANION GAP SERPL CALCULATED.3IONS-SCNC: 12 MMOL/L (ref 9–17)
BASOPHILS # BLD: 0 % (ref 0–2)
BASOPHILS ABSOLUTE: 0 K/UL (ref 0–0.2)
BUN BLDV-MCNC: 22 MG/DL (ref 6–20)
BUN/CREAT BLD: ABNORMAL (ref 9–20)
CALCIUM SERPL-MCNC: 8.8 MG/DL (ref 8.6–10.4)
CHLORIDE BLD-SCNC: 100 MMOL/L (ref 98–107)
CO2: 26 MMOL/L (ref 20–31)
CREAT SERPL-MCNC: 0.97 MG/DL (ref 0.7–1.2)
DIFFERENTIAL TYPE: ABNORMAL
EOSINOPHILS RELATIVE PERCENT: 0 % (ref 1–4)
GFR AFRICAN AMERICAN: >60 ML/MIN
GFR NON-AFRICAN AMERICAN: >60 ML/MIN
GFR SERPL CREATININE-BSD FRML MDRD: ABNORMAL ML/MIN/{1.73_M2}
GFR SERPL CREATININE-BSD FRML MDRD: ABNORMAL ML/MIN/{1.73_M2}
GLUCOSE BLD-MCNC: 106 MG/DL (ref 75–110)
GLUCOSE BLD-MCNC: 117 MG/DL (ref 75–110)
GLUCOSE BLD-MCNC: 118 MG/DL (ref 70–99)
GLUCOSE BLD-MCNC: 122 MG/DL (ref 75–110)
GLUCOSE BLD-MCNC: 98 MG/DL (ref 75–110)
HCT VFR BLD CALC: 28 % (ref 40.7–50.3)
HEMOGLOBIN: 8.9 G/DL (ref 13–17)
IMMATURE GRANULOCYTES: 4 %
LYMPHOCYTES # BLD: 12 % (ref 24–44)
MCH RBC QN AUTO: 30.8 PG (ref 25.2–33.5)
MCHC RBC AUTO-ENTMCNC: 31.8 G/DL (ref 28.4–34.8)
MCV RBC AUTO: 96.9 FL (ref 82.6–102.9)
MONOCYTES # BLD: 11 % (ref 1–7)
MORPHOLOGY: NORMAL
NRBC AUTOMATED: 0.1 PER 100 WBC
PDW BLD-RTO: 14 % (ref 11.8–14.4)
PLATELET # BLD: 748 K/UL (ref 138–453)
PLATELET ESTIMATE: ABNORMAL
PMV BLD AUTO: 9.5 FL (ref 8.1–13.5)
POTASSIUM SERPL-SCNC: 4.2 MMOL/L (ref 3.7–5.3)
RBC # BLD: 2.89 M/UL (ref 4.21–5.77)
RBC # BLD: ABNORMAL 10*6/UL
SEG NEUTROPHILS: 73 % (ref 36–66)
SEGMENTED NEUTROPHILS ABSOLUTE COUNT: 13.58 K/UL (ref 1.8–7.7)
SODIUM BLD-SCNC: 138 MMOL/L (ref 135–144)
WBC # BLD: 18.6 K/UL (ref 3.5–11.3)
WBC # BLD: ABNORMAL 10*3/UL

## 2019-07-28 PROCEDURE — 6370000000 HC RX 637 (ALT 250 FOR IP): Performed by: STUDENT IN AN ORGANIZED HEALTH CARE EDUCATION/TRAINING PROGRAM

## 2019-07-28 PROCEDURE — 2580000003 HC RX 258: Performed by: STUDENT IN AN ORGANIZED HEALTH CARE EDUCATION/TRAINING PROGRAM

## 2019-07-28 PROCEDURE — 85025 COMPLETE CBC W/AUTO DIFF WBC: CPT

## 2019-07-28 PROCEDURE — 71045 X-RAY EXAM CHEST 1 VIEW: CPT

## 2019-07-28 PROCEDURE — 36415 COLL VENOUS BLD VENIPUNCTURE: CPT

## 2019-07-28 PROCEDURE — 2060000000 HC ICU INTERMEDIATE R&B

## 2019-07-28 PROCEDURE — 2700000000 HC OXYGEN THERAPY PER DAY

## 2019-07-28 PROCEDURE — 6360000002 HC RX W HCPCS: Performed by: STUDENT IN AN ORGANIZED HEALTH CARE EDUCATION/TRAINING PROGRAM

## 2019-07-28 PROCEDURE — 80048 BASIC METABOLIC PNL TOTAL CA: CPT

## 2019-07-28 RX ADMIN — CLONIDINE HYDROCHLORIDE 0.1 MG: 0.2 TABLET ORAL at 11:33

## 2019-07-28 RX ADMIN — CLONIDINE HYDROCHLORIDE 0.1 MG: 0.2 TABLET ORAL at 03:10

## 2019-07-28 RX ADMIN — ACETAMINOPHEN 1000 MG: 500 TABLET ORAL at 03:10

## 2019-07-28 RX ADMIN — ENOXAPARIN SODIUM 30 MG: 100 INJECTION SUBCUTANEOUS at 21:08

## 2019-07-28 RX ADMIN — GABAPENTIN 900 MG: 300 CAPSULE ORAL at 09:12

## 2019-07-28 RX ADMIN — Medication 10 ML: at 21:00

## 2019-07-28 RX ADMIN — Medication 100 MG: at 09:15

## 2019-07-28 RX ADMIN — CLONIDINE HYDROCHLORIDE 0.1 MG: 0.2 TABLET ORAL at 18:45

## 2019-07-28 RX ADMIN — ACETAMINOPHEN 1000 MG: 500 TABLET ORAL at 11:33

## 2019-07-28 RX ADMIN — POTASSIUM CHLORIDE 20 MEQ: 1.5 POWDER, FOR SOLUTION ORAL at 21:08

## 2019-07-28 RX ADMIN — ACETAMINOPHEN 1000 MG: 500 TABLET ORAL at 18:45

## 2019-07-28 RX ADMIN — PROPRANOLOL HYDROCHLORIDE 10 MG: 10 TABLET ORAL at 17:27

## 2019-07-28 RX ADMIN — MAGNESIUM GLUCONATE 500 MG ORAL TABLET 400 MG: 500 TABLET ORAL at 09:15

## 2019-07-28 RX ADMIN — THERA TABS 1 TABLET: TAB at 09:15

## 2019-07-28 RX ADMIN — DIAZEPAM 5 MG: 5 TABLET ORAL at 00:11

## 2019-07-28 RX ADMIN — CYCLOBENZAPRINE 5 MG: 10 TABLET, FILM COATED ORAL at 09:13

## 2019-07-28 RX ADMIN — DIAZEPAM 5 MG: 5 TABLET ORAL at 06:24

## 2019-07-28 RX ADMIN — Medication 100 MG: at 15:44

## 2019-07-28 RX ADMIN — OXYCODONE HYDROCHLORIDE 5 MG: 5 TABLET ORAL at 03:10

## 2019-07-28 RX ADMIN — OXYCODONE HYDROCHLORIDE 5 MG: 5 TABLET ORAL at 21:07

## 2019-07-28 RX ADMIN — DIAZEPAM 5 MG: 5 TABLET ORAL at 17:27

## 2019-07-28 RX ADMIN — OXYCODONE HYDROCHLORIDE 5 MG: 5 TABLET ORAL at 09:12

## 2019-07-28 RX ADMIN — PROPRANOLOL HYDROCHLORIDE 10 MG: 10 TABLET ORAL at 00:00

## 2019-07-28 RX ADMIN — GABAPENTIN 900 MG: 300 CAPSULE ORAL at 21:08

## 2019-07-28 RX ADMIN — PROPRANOLOL HYDROCHLORIDE 10 MG: 10 TABLET ORAL at 09:13

## 2019-07-28 RX ADMIN — QUETIAPINE FUMARATE 100 MG: 100 TABLET ORAL at 21:08

## 2019-07-28 RX ADMIN — QUETIAPINE FUMARATE 100 MG: 100 TABLET ORAL at 09:15

## 2019-07-28 RX ADMIN — SODIUM CHLORIDE, POTASSIUM CHLORIDE, SODIUM LACTATE AND CALCIUM CHLORIDE: 600; 310; 30; 20 INJECTION, SOLUTION INTRAVENOUS at 00:39

## 2019-07-28 RX ADMIN — MAGNESIUM GLUCONATE 500 MG ORAL TABLET 400 MG: 500 TABLET ORAL at 21:08

## 2019-07-28 RX ADMIN — PIPERACILLIN SODIUM AND TAZOBACTAM SODIUM 4.5 G: 4; .5 INJECTION, POWDER, LYOPHILIZED, FOR SOLUTION INTRAVENOUS at 00:29

## 2019-07-28 RX ADMIN — GABAPENTIN 900 MG: 300 CAPSULE ORAL at 15:44

## 2019-07-28 RX ADMIN — ENOXAPARIN SODIUM 30 MG: 100 INJECTION SUBCUTANEOUS at 09:14

## 2019-07-28 RX ADMIN — Medication 100 MG: at 21:08

## 2019-07-28 RX ADMIN — CYCLOBENZAPRINE 5 MG: 10 TABLET, FILM COATED ORAL at 17:27

## 2019-07-28 RX ADMIN — POTASSIUM CHLORIDE 20 MEQ: 1.5 POWDER, FOR SOLUTION ORAL at 09:15

## 2019-07-28 RX ADMIN — ONDANSETRON 4 MG: 2 INJECTION INTRAMUSCULAR; INTRAVENOUS at 20:25

## 2019-07-28 RX ADMIN — FOLIC ACID 1 MG: 1 TABLET ORAL at 09:13

## 2019-07-28 RX ADMIN — PIPERACILLIN SODIUM AND TAZOBACTAM SODIUM 4.5 G: 4; .5 INJECTION, POWDER, LYOPHILIZED, FOR SOLUTION INTRAVENOUS at 14:30

## 2019-07-28 RX ADMIN — OXYCODONE HYDROCHLORIDE 5 MG: 5 TABLET ORAL at 15:22

## 2019-07-28 RX ADMIN — PIPERACILLIN SODIUM AND TAZOBACTAM SODIUM 4.5 G: 4; .5 INJECTION, POWDER, LYOPHILIZED, FOR SOLUTION INTRAVENOUS at 09:17

## 2019-07-28 RX ADMIN — DIAZEPAM 5 MG: 5 TABLET ORAL at 11:33

## 2019-07-28 ASSESSMENT — PAIN SCALES - WONG BAKER
WONGBAKER_NUMERICALRESPONSE: 0

## 2019-07-28 ASSESSMENT — PAIN SCALES - GENERAL
PAINLEVEL_OUTOF10: 7
PAINLEVEL_OUTOF10: 6
PAINLEVEL_OUTOF10: 7

## 2019-07-28 ASSESSMENT — PAIN DESCRIPTION - ORIENTATION: ORIENTATION: RIGHT

## 2019-07-28 ASSESSMENT — PAIN DESCRIPTION - DESCRIPTORS: DESCRIPTORS: SORE

## 2019-07-28 ASSESSMENT — PAIN DESCRIPTION - LOCATION: LOCATION: RIB CAGE

## 2019-07-28 ASSESSMENT — PAIN DESCRIPTION - PAIN TYPE: TYPE: ACUTE PAIN

## 2019-07-28 ASSESSMENT — PAIN DESCRIPTION - FREQUENCY: FREQUENCY: INTERMITTENT

## 2019-07-28 NOTE — PLAN OF CARE
Problem: Pain:  Goal: Control of acute pain  Description  Control of acute pain   Outcome: Ongoing  Note:   Patient does complain of right chest/rib pain and given pain medications as ordered. Patient pain re-assess after 1 hour and patient observed resting with eyes close. No signs of distress. Problem: Falls - Risk of:  Goal: Will remain free from falls  Description  Will remain free from falls   Outcome: Ongoing  Goal: Absence of physical injury  Description  Absence of physical injury   Outcome: Ongoing  Note:   Bed lock and in lowest position,  rails up x 2, room free from clutter, call light within reach and bed alarm on. Patient remains free from falls and physical injury. Problem: Confusion - Acute:  Goal: Mental status will be restored to baseline  Description  Mental status will be restored to baseline  Outcome: Ongoing  Note:   Patient alert and oriented x 1. Patient reoriented to time, date and situation. Problem: Injury - Risk of, Physical Injury:  Goal: Will remain free from falls  Description  Will remain free from falls   Outcome: Ongoing  Goal: Absence of physical injury  Description  Absence of physical injury   Outcome: Ongoing  Note:   Bed lock and in lowest position,  rails up x 2, room free from clutter, call light within reach and bed alarm on. Patient remains free from falls and physical injury. Problem: SKIN INTEGRITY  Goal: Skin integrity is maintained or improved  Outcome: Ongoing  Note:   Patient does have redness on buttocks and writer applied moisture barrier cream to area. Problem: Restraint Use - Nonviolent/Non-Self-Destructive Behavior:  Goal: Absence of restraint indications  Description  Absence of restraint indications  Outcome: Ongoing  Note:   Patient AWA soft wrist restraints removed and patient immediatly reaches towards high flow nasal cannula and NG tube. Verbal direction and patient reoriented to situation.    Goal: Absence of

## 2019-07-28 NOTE — PROGRESS NOTES
1. 07   GLUCOSE 111* 112*     COAGS: No results for input(s): APTT, PROT, INR in the last 72 hours. RADIOLOGY:  Narrative   EXAMINATION:   ONE XRAY VIEW OF THE CHEST       7/28/2019 8:02 am       COMPARISON:   July 27, 2019, July 26, 2019       HISTORY:   ORDERING SYSTEM PROVIDED HISTORY: chest tube to water seal   TECHNOLOGIST PROVIDED HISTORY:   chest tube to water seal       FINDINGS:   Postoperative findings in the right hemithorax with rib repair again noted. Chest tubes in the medial and inferior right hemithorax again demonstrated. No pneumothorax identified.  The cardiac and mediastinal contours are   unchanged in appearance.  Scattered opacities in the right lung are again   noted.  Slight blunting the right costophrenic angle appears unchanged.  The   enteric tube courses off the field of view in the upper abdomen.           Impression   Unchanged posttraumatic and postoperative findings in the right chest.  No   pneumothorax identified.       No significant change in right airspace disease. Trinidad Casarez MD  7/28/19, 7:43 AM                   Trauma Attending Tony Drake      I have reviewed the above GCS note(s) and confirmed the key elements of the medical history and physical exam. I have seen and examined the pt. I have discussed the findings, established the care plan and recommendations with Resident, GCS RN, bedside nurse.   Pre-renal azotemia   Leukocytosis - source evaluated by ICU team - continue Pulm toilet   Anemia stable   Chest tube to water seal - d/c 1/2  Encourage day/ night cycle   Will minimize meds   Family updated pt stated he was going to behave     Vaughn Graft, DO  7/28/2019  2:50 PM

## 2019-07-29 ENCOUNTER — APPOINTMENT (OUTPATIENT)
Dept: GENERAL RADIOLOGY | Age: 59
DRG: 957 | End: 2019-07-29
Payer: COMMERCIAL

## 2019-07-29 LAB
-: NORMAL
ABSOLUTE EOS #: 1.67 K/UL (ref 0–0.4)
ABSOLUTE IMMATURE GRANULOCYTE: 0 K/UL (ref 0–0.3)
ABSOLUTE LYMPH #: 0.96 K/UL (ref 1–4.8)
ABSOLUTE MONO #: 2.63 K/UL (ref 0.1–0.8)
AMORPHOUS: NORMAL
ANION GAP SERPL CALCULATED.3IONS-SCNC: 16 MMOL/L (ref 9–17)
BACTERIA: NORMAL
BASOPHILS # BLD: 1 % (ref 0–2)
BASOPHILS ABSOLUTE: 0.24 K/UL (ref 0–0.2)
BILIRUBIN URINE: NEGATIVE
BUN BLDV-MCNC: 23 MG/DL (ref 6–20)
BUN/CREAT BLD: ABNORMAL (ref 9–20)
CALCIUM SERPL-MCNC: 9 MG/DL (ref 8.6–10.4)
CASTS UA: NORMAL /LPF (ref 0–8)
CHLORIDE BLD-SCNC: 100 MMOL/L (ref 98–107)
CO2: 20 MMOL/L (ref 20–31)
COLOR: YELLOW
COMMENT UA: ABNORMAL
CREAT SERPL-MCNC: 1.06 MG/DL (ref 0.7–1.2)
CRYSTALS, UA: NORMAL /HPF
DIFFERENTIAL TYPE: ABNORMAL
EOSINOPHILS RELATIVE PERCENT: 7 % (ref 1–4)
EPITHELIAL CELLS UA: NORMAL /HPF (ref 0–5)
GFR AFRICAN AMERICAN: >60 ML/MIN
GFR NON-AFRICAN AMERICAN: >60 ML/MIN
GFR SERPL CREATININE-BSD FRML MDRD: ABNORMAL ML/MIN/{1.73_M2}
GFR SERPL CREATININE-BSD FRML MDRD: ABNORMAL ML/MIN/{1.73_M2}
GLUCOSE BLD-MCNC: 103 MG/DL (ref 70–99)
GLUCOSE BLD-MCNC: 105 MG/DL (ref 75–110)
GLUCOSE BLD-MCNC: 106 MG/DL (ref 75–110)
GLUCOSE BLD-MCNC: 106 MG/DL (ref 75–110)
GLUCOSE BLD-MCNC: 119 MG/DL (ref 75–110)
GLUCOSE BLD-MCNC: 121 MG/DL (ref 75–110)
GLUCOSE URINE: NEGATIVE
HCT VFR BLD CALC: 33.3 % (ref 40.7–50.3)
HEMOGLOBIN: 10 G/DL (ref 13–17)
IMMATURE GRANULOCYTES: 0 %
KETONES, URINE: NEGATIVE
LEUKOCYTE ESTERASE, URINE: NEGATIVE
LYMPHOCYTES # BLD: 4 % (ref 24–44)
MCH RBC QN AUTO: 29.9 PG (ref 25.2–33.5)
MCHC RBC AUTO-ENTMCNC: 30 G/DL (ref 28.4–34.8)
MCV RBC AUTO: 99.4 FL (ref 82.6–102.9)
MONOCYTES # BLD: 11 % (ref 1–7)
MORPHOLOGY: NORMAL
MUCUS: NORMAL
NITRITE, URINE: NEGATIVE
NRBC AUTOMATED: 0 PER 100 WBC
OTHER OBSERVATIONS UA: NORMAL
PDW BLD-RTO: 14 % (ref 11.8–14.4)
PH UA: 7.5 (ref 5–8)
PLATELET # BLD: 827 K/UL (ref 138–453)
PLATELET ESTIMATE: ABNORMAL
PMV BLD AUTO: 9.4 FL (ref 8.1–13.5)
POTASSIUM SERPL-SCNC: 5.2 MMOL/L (ref 3.7–5.3)
PROTEIN UA: ABNORMAL
RBC # BLD: 3.35 M/UL (ref 4.21–5.77)
RBC # BLD: ABNORMAL 10*6/UL
RBC UA: NORMAL /HPF (ref 0–4)
RENAL EPITHELIAL, UA: NORMAL /HPF
SEG NEUTROPHILS: 77 % (ref 36–66)
SEGMENTED NEUTROPHILS ABSOLUTE COUNT: 18.4 K/UL (ref 1.8–7.7)
SODIUM BLD-SCNC: 136 MMOL/L (ref 135–144)
SPECIFIC GRAVITY UA: 1.02 (ref 1–1.03)
TRICHOMONAS: NORMAL
TURBIDITY: CLEAR
URINE HGB: NEGATIVE
UROBILINOGEN, URINE: NORMAL
WBC # BLD: 23.9 K/UL (ref 3.5–11.3)
WBC # BLD: ABNORMAL 10*3/UL
WBC UA: NORMAL /HPF (ref 0–5)
YEAST: NORMAL

## 2019-07-29 PROCEDURE — 6360000002 HC RX W HCPCS: Performed by: STUDENT IN AN ORGANIZED HEALTH CARE EDUCATION/TRAINING PROGRAM

## 2019-07-29 PROCEDURE — 92610 EVALUATE SWALLOWING FUNCTION: CPT

## 2019-07-29 PROCEDURE — 6370000000 HC RX 637 (ALT 250 FOR IP): Performed by: STUDENT IN AN ORGANIZED HEALTH CARE EDUCATION/TRAINING PROGRAM

## 2019-07-29 PROCEDURE — 2060000000 HC ICU INTERMEDIATE R&B

## 2019-07-29 PROCEDURE — 2700000000 HC OXYGEN THERAPY PER DAY

## 2019-07-29 PROCEDURE — 71045 X-RAY EXAM CHEST 1 VIEW: CPT

## 2019-07-29 PROCEDURE — 81001 URINALYSIS AUTO W/SCOPE: CPT

## 2019-07-29 PROCEDURE — 80048 BASIC METABOLIC PNL TOTAL CA: CPT

## 2019-07-29 PROCEDURE — 2580000003 HC RX 258: Performed by: STUDENT IN AN ORGANIZED HEALTH CARE EDUCATION/TRAINING PROGRAM

## 2019-07-29 PROCEDURE — 85025 COMPLETE CBC W/AUTO DIFF WBC: CPT

## 2019-07-29 PROCEDURE — 36415 COLL VENOUS BLD VENIPUNCTURE: CPT

## 2019-07-29 PROCEDURE — 87040 BLOOD CULTURE FOR BACTERIA: CPT

## 2019-07-29 PROCEDURE — 82947 ASSAY GLUCOSE BLOOD QUANT: CPT

## 2019-07-29 PROCEDURE — 94761 N-INVAS EAR/PLS OXIMETRY MLT: CPT

## 2019-07-29 RX ORDER — SODIUM CHLORIDE, SODIUM LACTATE, POTASSIUM CHLORIDE, CALCIUM CHLORIDE 600; 310; 30; 20 MG/100ML; MG/100ML; MG/100ML; MG/100ML
INJECTION, SOLUTION INTRAVENOUS ONCE
Status: COMPLETED | OUTPATIENT
Start: 2019-07-29 | End: 2019-07-29

## 2019-07-29 RX ORDER — HALOPERIDOL 5 MG/ML
2 INJECTION INTRAMUSCULAR ONCE
Status: COMPLETED | OUTPATIENT
Start: 2019-07-29 | End: 2019-07-29

## 2019-07-29 RX ADMIN — CLONIDINE HYDROCHLORIDE 0.1 MG: 0.2 TABLET ORAL at 22:12

## 2019-07-29 RX ADMIN — FOLIC ACID 1 MG: 1 TABLET ORAL at 08:50

## 2019-07-29 RX ADMIN — SODIUM CHLORIDE, POTASSIUM CHLORIDE, SODIUM LACTATE AND CALCIUM CHLORIDE: 600; 310; 30; 20 INJECTION, SOLUTION INTRAVENOUS at 18:05

## 2019-07-29 RX ADMIN — MAGNESIUM GLUCONATE 500 MG ORAL TABLET 400 MG: 500 TABLET ORAL at 08:50

## 2019-07-29 RX ADMIN — CLONIDINE HYDROCHLORIDE 0.1 MG: 0.2 TABLET ORAL at 11:11

## 2019-07-29 RX ADMIN — Medication 100 MG: at 22:12

## 2019-07-29 RX ADMIN — DIAZEPAM 5 MG: 5 TABLET ORAL at 11:10

## 2019-07-29 RX ADMIN — CYCLOBENZAPRINE 5 MG: 10 TABLET, FILM COATED ORAL at 00:37

## 2019-07-29 RX ADMIN — POTASSIUM CHLORIDE 20 MEQ: 1.5 POWDER, FOR SOLUTION ORAL at 22:12

## 2019-07-29 RX ADMIN — PROPRANOLOL HYDROCHLORIDE 10 MG: 10 TABLET ORAL at 17:00

## 2019-07-29 RX ADMIN — HALOPERIDOL LACTATE 2 MG: 5 INJECTION INTRAMUSCULAR at 09:07

## 2019-07-29 RX ADMIN — SODIUM CHLORIDE, PRESERVATIVE FREE 10 ML: 5 INJECTION INTRAVENOUS at 22:13

## 2019-07-29 RX ADMIN — ACETAMINOPHEN 1000 MG: 500 TABLET ORAL at 22:12

## 2019-07-29 RX ADMIN — OXYCODONE HYDROCHLORIDE 5 MG: 5 TABLET ORAL at 22:11

## 2019-07-29 RX ADMIN — CYCLOBENZAPRINE 5 MG: 10 TABLET, FILM COATED ORAL at 17:00

## 2019-07-29 RX ADMIN — ENOXAPARIN SODIUM 30 MG: 100 INJECTION SUBCUTANEOUS at 08:49

## 2019-07-29 RX ADMIN — GABAPENTIN 900 MG: 300 CAPSULE ORAL at 22:12

## 2019-07-29 RX ADMIN — Medication 100 MG: at 08:50

## 2019-07-29 RX ADMIN — PIPERACILLIN SODIUM AND TAZOBACTAM SODIUM 4.5 G: 4; .5 INJECTION, POWDER, LYOPHILIZED, FOR SOLUTION INTRAVENOUS at 17:00

## 2019-07-29 RX ADMIN — CYCLOBENZAPRINE 5 MG: 10 TABLET, FILM COATED ORAL at 08:47

## 2019-07-29 RX ADMIN — ACETAMINOPHEN 1000 MG: 500 TABLET ORAL at 11:11

## 2019-07-29 RX ADMIN — ENOXAPARIN SODIUM 30 MG: 100 INJECTION SUBCUTANEOUS at 22:12

## 2019-07-29 RX ADMIN — OXYCODONE HYDROCHLORIDE 5 MG: 5 TABLET ORAL at 08:48

## 2019-07-29 RX ADMIN — ACETAMINOPHEN 1000 MG: 500 TABLET ORAL at 04:08

## 2019-07-29 RX ADMIN — QUETIAPINE FUMARATE 100 MG: 100 TABLET ORAL at 08:49

## 2019-07-29 RX ADMIN — MAGNESIUM GLUCONATE 500 MG ORAL TABLET 400 MG: 500 TABLET ORAL at 22:12

## 2019-07-29 RX ADMIN — PIPERACILLIN SODIUM AND TAZOBACTAM SODIUM 4.5 G: 4; .5 INJECTION, POWDER, LYOPHILIZED, FOR SOLUTION INTRAVENOUS at 09:07

## 2019-07-29 RX ADMIN — Medication 100 MG: at 14:22

## 2019-07-29 RX ADMIN — CLONIDINE HYDROCHLORIDE 0.1 MG: 0.2 TABLET ORAL at 02:37

## 2019-07-29 RX ADMIN — PIPERACILLIN SODIUM AND TAZOBACTAM SODIUM 4.5 G: 4; .5 INJECTION, POWDER, LYOPHILIZED, FOR SOLUTION INTRAVENOUS at 00:37

## 2019-07-29 RX ADMIN — SODIUM CHLORIDE, POTASSIUM CHLORIDE, SODIUM LACTATE AND CALCIUM CHLORIDE: 600; 310; 30; 20 INJECTION, SOLUTION INTRAVENOUS at 15:30

## 2019-07-29 RX ADMIN — PROPRANOLOL HYDROCHLORIDE 10 MG: 10 TABLET ORAL at 00:36

## 2019-07-29 RX ADMIN — QUETIAPINE FUMARATE 100 MG: 100 TABLET ORAL at 22:11

## 2019-07-29 RX ADMIN — PROPRANOLOL HYDROCHLORIDE 10 MG: 10 TABLET ORAL at 08:50

## 2019-07-29 RX ADMIN — DIAZEPAM 5 MG: 5 TABLET ORAL at 00:36

## 2019-07-29 RX ADMIN — OXYCODONE HYDROCHLORIDE 5 MG: 5 TABLET ORAL at 02:37

## 2019-07-29 RX ADMIN — THERA TABS 1 TABLET: TAB at 08:50

## 2019-07-29 RX ADMIN — GABAPENTIN 900 MG: 300 CAPSULE ORAL at 08:46

## 2019-07-29 RX ADMIN — DIAZEPAM 5 MG: 5 TABLET ORAL at 06:08

## 2019-07-29 ASSESSMENT — PAIN SCALES - GENERAL
PAINLEVEL_OUTOF10: 5
PAINLEVEL_OUTOF10: 6
PAINLEVEL_OUTOF10: 6
PAINLEVEL_OUTOF10: 5
PAINLEVEL_OUTOF10: 5

## 2019-07-29 ASSESSMENT — PAIN SCALES - WONG BAKER
WONGBAKER_NUMERICALRESPONSE: 6

## 2019-07-29 ASSESSMENT — PAIN DESCRIPTION - DESCRIPTORS: DESCRIPTORS: PATIENT UNABLE TO DESCRIBE

## 2019-07-29 ASSESSMENT — PAIN DESCRIPTION - LOCATION: LOCATION: OTHER (COMMENT)

## 2019-07-29 ASSESSMENT — PAIN DESCRIPTION - FREQUENCY: FREQUENCY: OTHER (COMMENT)

## 2019-07-29 ASSESSMENT — PAIN DESCRIPTION - ORIENTATION: ORIENTATION: OTHER (COMMENT)

## 2019-07-29 ASSESSMENT — PAIN DESCRIPTION - PAIN TYPE: TYPE: ACUTE PAIN

## 2019-07-29 NOTE — PROGRESS NOTES
Occupational Therapy Not Seen Note    DATE: 2019  Name: Rose Solis  : 1960  MRN: [id number]    Patient not available for Occupational Therapy due to:     Other: pt not appropriate for OOB activity this afternoon due to agitation/restraints    Next Scheduled Treatment: check back 19    Electronically signed by Sukhjinder Fry 2019 at 4:20 PM

## 2019-07-29 NOTE — PROGRESS NOTES
Occupational Therapy    Occupational Therapy Not Seen Note    DATE: 2019  Name: Ignacia Cuba  : 1960  MRN: [id number]    Patient not available for Occupational Therapy due to:     Other: Several nurses providing patient care at this time     Next Scheduled Treatment: Check back later this date if time allows     Electronically signed by Roxan Goltz, COTA/L on 2019 at 8:16 AM

## 2019-07-30 ENCOUNTER — APPOINTMENT (OUTPATIENT)
Dept: GENERAL RADIOLOGY | Age: 59
DRG: 957 | End: 2019-07-30
Payer: COMMERCIAL

## 2019-07-30 LAB
ABSOLUTE EOS #: 0.21 K/UL (ref 0–0.4)
ABSOLUTE EOS #: 0.4 K/UL (ref 0–0.4)
ABSOLUTE IMMATURE GRANULOCYTE: 0.42 K/UL (ref 0–0.3)
ABSOLUTE IMMATURE GRANULOCYTE: 0.6 K/UL (ref 0–0.3)
ABSOLUTE LYMPH #: 1.41 K/UL (ref 1–4.8)
ABSOLUTE LYMPH #: 4.39 K/UL (ref 1–4.8)
ABSOLUTE MONO #: 0.21 K/UL (ref 0.1–0.8)
ABSOLUTE MONO #: 1.81 K/UL (ref 0.1–0.8)
ALBUMIN SERPL-MCNC: 2.7 G/DL (ref 3.5–5.2)
ALBUMIN/GLOBULIN RATIO: 0.7 (ref 1–2.5)
ALP BLD-CCNC: 140 U/L (ref 40–129)
ALT SERPL-CCNC: 29 U/L (ref 5–41)
ANION GAP SERPL CALCULATED.3IONS-SCNC: 12 MMOL/L (ref 9–17)
ANION GAP SERPL CALCULATED.3IONS-SCNC: 13 MMOL/L (ref 9–17)
AST SERPL-CCNC: 38 U/L
BASOPHILS # BLD: 1 % (ref 0–2)
BASOPHILS # BLD: 1 % (ref 0–2)
BASOPHILS ABSOLUTE: 0.2 K/UL (ref 0–0.2)
BASOPHILS ABSOLUTE: 0.21 K/UL (ref 0–0.2)
BILIRUB SERPL-MCNC: 0.9 MG/DL (ref 0.3–1.2)
BUN BLDV-MCNC: 24 MG/DL (ref 6–20)
BUN BLDV-MCNC: 25 MG/DL (ref 6–20)
BUN/CREAT BLD: ABNORMAL (ref 9–20)
BUN/CREAT BLD: ABNORMAL (ref 9–20)
CALCIUM SERPL-MCNC: 8.8 MG/DL (ref 8.6–10.4)
CALCIUM SERPL-MCNC: 9 MG/DL (ref 8.6–10.4)
CHLORIDE BLD-SCNC: 100 MMOL/L (ref 98–107)
CHLORIDE BLD-SCNC: 101 MMOL/L (ref 98–107)
CO2: 21 MMOL/L (ref 20–31)
CO2: 23 MMOL/L (ref 20–31)
CREAT SERPL-MCNC: 1.03 MG/DL (ref 0.7–1.2)
CREAT SERPL-MCNC: 1.18 MG/DL (ref 0.7–1.2)
DIFFERENTIAL TYPE: ABNORMAL
DIFFERENTIAL TYPE: ABNORMAL
EOSINOPHILS RELATIVE PERCENT: 1 % (ref 1–4)
EOSINOPHILS RELATIVE PERCENT: 2 % (ref 1–4)
GFR AFRICAN AMERICAN: >60 ML/MIN
GFR AFRICAN AMERICAN: >60 ML/MIN
GFR NON-AFRICAN AMERICAN: >60 ML/MIN
GFR NON-AFRICAN AMERICAN: >60 ML/MIN
GFR SERPL CREATININE-BSD FRML MDRD: ABNORMAL ML/MIN/{1.73_M2}
GLUCOSE BLD-MCNC: 106 MG/DL (ref 70–99)
GLUCOSE BLD-MCNC: 109 MG/DL (ref 75–110)
GLUCOSE BLD-MCNC: 109 MG/DL (ref 75–110)
GLUCOSE BLD-MCNC: 117 MG/DL (ref 75–110)
GLUCOSE BLD-MCNC: 128 MG/DL (ref 70–99)
GLUCOSE BLD-MCNC: 128 MG/DL (ref 75–110)
GLUCOSE BLD-MCNC: 96 MG/DL (ref 75–110)
HCT VFR BLD CALC: 27.3 % (ref 40.7–50.3)
HCT VFR BLD CALC: 28.4 % (ref 40.7–50.3)
HEMOGLOBIN: 8.4 G/DL (ref 13–17)
HEMOGLOBIN: 8.5 G/DL (ref 13–17)
IMMATURE GRANULOCYTES: 2 %
IMMATURE GRANULOCYTES: 3 %
LYMPHOCYTES # BLD: 21 % (ref 24–44)
LYMPHOCYTES # BLD: 7 % (ref 24–44)
MCH RBC QN AUTO: 29.4 PG (ref 25.2–33.5)
MCH RBC QN AUTO: 29.8 PG (ref 25.2–33.5)
MCHC RBC AUTO-ENTMCNC: 29.6 G/DL (ref 28.4–34.8)
MCHC RBC AUTO-ENTMCNC: 31.1 G/DL (ref 28.4–34.8)
MCV RBC AUTO: 95.8 FL (ref 82.6–102.9)
MCV RBC AUTO: 99.3 FL (ref 82.6–102.9)
MONOCYTES # BLD: 1 % (ref 1–7)
MONOCYTES # BLD: 9 % (ref 1–7)
MORPHOLOGY: NORMAL
MORPHOLOGY: NORMAL
NRBC AUTOMATED: 0 PER 100 WBC
NRBC AUTOMATED: 0 PER 100 WBC
PDW BLD-RTO: 13.6 % (ref 11.8–14.4)
PDW BLD-RTO: 13.7 % (ref 11.8–14.4)
PLATELET # BLD: 812 K/UL (ref 138–453)
PLATELET # BLD: 865 K/UL (ref 138–453)
PLATELET ESTIMATE: ABNORMAL
PLATELET ESTIMATE: ABNORMAL
PMV BLD AUTO: 9.2 FL (ref 8.1–13.5)
PMV BLD AUTO: 9.4 FL (ref 8.1–13.5)
POTASSIUM SERPL-SCNC: 4.4 MMOL/L (ref 3.7–5.3)
POTASSIUM SERPL-SCNC: 4.5 MMOL/L (ref 3.7–5.3)
RBC # BLD: 2.85 M/UL (ref 4.21–5.77)
RBC # BLD: 2.86 M/UL (ref 4.21–5.77)
RBC # BLD: ABNORMAL 10*6/UL
RBC # BLD: ABNORMAL 10*6/UL
SEG NEUTROPHILS: 74 % (ref 36–66)
SEG NEUTROPHILS: 78 % (ref 36–66)
SEGMENTED NEUTROPHILS ABSOLUTE COUNT: 15.46 K/UL (ref 1.8–7.7)
SEGMENTED NEUTROPHILS ABSOLUTE COUNT: 15.68 K/UL (ref 1.8–7.7)
SODIUM BLD-SCNC: 135 MMOL/L (ref 135–144)
SODIUM BLD-SCNC: 135 MMOL/L (ref 135–144)
TOTAL PROTEIN: 6.6 G/DL (ref 6.4–8.3)
WBC # BLD: 20.1 K/UL (ref 3.5–11.3)
WBC # BLD: 20.9 K/UL (ref 3.5–11.3)
WBC # BLD: ABNORMAL 10*3/UL
WBC # BLD: ABNORMAL 10*3/UL

## 2019-07-30 PROCEDURE — 6370000000 HC RX 637 (ALT 250 FOR IP): Performed by: STUDENT IN AN ORGANIZED HEALTH CARE EDUCATION/TRAINING PROGRAM

## 2019-07-30 PROCEDURE — 36415 COLL VENOUS BLD VENIPUNCTURE: CPT

## 2019-07-30 PROCEDURE — 6360000002 HC RX W HCPCS: Performed by: STUDENT IN AN ORGANIZED HEALTH CARE EDUCATION/TRAINING PROGRAM

## 2019-07-30 PROCEDURE — 82947 ASSAY GLUCOSE BLOOD QUANT: CPT

## 2019-07-30 PROCEDURE — 80053 COMPREHEN METABOLIC PANEL: CPT

## 2019-07-30 PROCEDURE — 80048 BASIC METABOLIC PNL TOTAL CA: CPT

## 2019-07-30 PROCEDURE — 2060000000 HC ICU INTERMEDIATE R&B

## 2019-07-30 PROCEDURE — 2580000003 HC RX 258: Performed by: STUDENT IN AN ORGANIZED HEALTH CARE EDUCATION/TRAINING PROGRAM

## 2019-07-30 PROCEDURE — 85025 COMPLETE CBC W/AUTO DIFF WBC: CPT

## 2019-07-30 PROCEDURE — 94761 N-INVAS EAR/PLS OXIMETRY MLT: CPT

## 2019-07-30 PROCEDURE — 71045 X-RAY EXAM CHEST 1 VIEW: CPT

## 2019-07-30 PROCEDURE — 2700000000 HC OXYGEN THERAPY PER DAY

## 2019-07-30 RX ORDER — CLONIDINE HYDROCHLORIDE 0.1 MG/1
0.1 TABLET ORAL DAILY
Status: DISPENSED | OUTPATIENT
Start: 2019-08-01 | End: 2019-08-03

## 2019-07-30 RX ORDER — SODIUM CHLORIDE, SODIUM LACTATE, POTASSIUM CHLORIDE, AND CALCIUM CHLORIDE .6; .31; .03; .02 G/100ML; G/100ML; G/100ML; G/100ML
1000 INJECTION, SOLUTION INTRAVENOUS ONCE
Status: COMPLETED | OUTPATIENT
Start: 2019-07-30 | End: 2019-07-30

## 2019-07-30 RX ORDER — CLONIDINE HYDROCHLORIDE 0.1 MG/1
0.1 TABLET ORAL 2 TIMES DAILY
Status: DISPENSED | OUTPATIENT
Start: 2019-07-30 | End: 2019-08-01

## 2019-07-30 RX ORDER — VANCOMYCIN HYDROCHLORIDE 1 G/200ML
1000 INJECTION, SOLUTION INTRAVENOUS EVERY 12 HOURS
Status: COMPLETED | OUTPATIENT
Start: 2019-07-30 | End: 2019-08-04

## 2019-07-30 RX ORDER — TRAZODONE HYDROCHLORIDE 100 MG/1
100 TABLET ORAL NIGHTLY
Status: DISCONTINUED | OUTPATIENT
Start: 2019-07-30 | End: 2019-08-15 | Stop reason: HOSPADM

## 2019-07-30 RX ORDER — OXYCODONE HYDROCHLORIDE 5 MG/1
5 TABLET ORAL EVERY 6 HOURS PRN
Status: DISCONTINUED | OUTPATIENT
Start: 2019-07-30 | End: 2019-08-02

## 2019-07-30 RX ORDER — DOCUSATE SODIUM 100 MG/1
100 CAPSULE, LIQUID FILLED ORAL 2 TIMES DAILY
Status: DISCONTINUED | OUTPATIENT
Start: 2019-07-30 | End: 2019-08-01

## 2019-07-30 RX ADMIN — PIPERACILLIN SODIUM AND TAZOBACTAM SODIUM 4.5 G: 4; .5 INJECTION, POWDER, LYOPHILIZED, FOR SOLUTION INTRAVENOUS at 00:47

## 2019-07-30 RX ADMIN — ACETAMINOPHEN 1000 MG: 500 TABLET ORAL at 03:40

## 2019-07-30 RX ADMIN — THERA TABS 1 TABLET: TAB at 10:56

## 2019-07-30 RX ADMIN — GABAPENTIN 900 MG: 300 CAPSULE ORAL at 08:58

## 2019-07-30 RX ADMIN — SODIUM CHLORIDE, PRESERVATIVE FREE 10 ML: 5 INJECTION INTRAVENOUS at 20:34

## 2019-07-30 RX ADMIN — PIPERACILLIN SODIUM AND TAZOBACTAM SODIUM 4.5 G: 4; .5 INJECTION, POWDER, LYOPHILIZED, FOR SOLUTION INTRAVENOUS at 17:08

## 2019-07-30 RX ADMIN — ACETAMINOPHEN 1000 MG: 500 TABLET ORAL at 11:26

## 2019-07-30 RX ADMIN — VANCOMYCIN HYDROCHLORIDE 1000 MG: 1 INJECTION, SOLUTION INTRAVENOUS at 17:08

## 2019-07-30 RX ADMIN — QUETIAPINE FUMARATE 100 MG: 100 TABLET ORAL at 08:58

## 2019-07-30 RX ADMIN — PIPERACILLIN SODIUM AND TAZOBACTAM SODIUM 4.5 G: 4; .5 INJECTION, POWDER, LYOPHILIZED, FOR SOLUTION INTRAVENOUS at 09:01

## 2019-07-30 RX ADMIN — MAGNESIUM GLUCONATE 500 MG ORAL TABLET 400 MG: 500 TABLET ORAL at 08:58

## 2019-07-30 RX ADMIN — OXYCODONE HYDROCHLORIDE 5 MG: 5 TABLET ORAL at 08:58

## 2019-07-30 RX ADMIN — GABAPENTIN 900 MG: 300 CAPSULE ORAL at 20:33

## 2019-07-30 RX ADMIN — Medication 100 MG: at 14:23

## 2019-07-30 RX ADMIN — OXYCODONE HYDROCHLORIDE 5 MG: 5 TABLET ORAL at 20:58

## 2019-07-30 RX ADMIN — FOLIC ACID 1 MG: 1 TABLET ORAL at 08:58

## 2019-07-30 RX ADMIN — OXYCODONE HYDROCHLORIDE 5 MG: 5 TABLET ORAL at 02:43

## 2019-07-30 RX ADMIN — POTASSIUM CHLORIDE 20 MEQ: 1.5 POWDER, FOR SOLUTION ORAL at 08:59

## 2019-07-30 RX ADMIN — PROPRANOLOL HYDROCHLORIDE 10 MG: 10 TABLET ORAL at 04:33

## 2019-07-30 RX ADMIN — ENOXAPARIN SODIUM 30 MG: 100 INJECTION SUBCUTANEOUS at 20:33

## 2019-07-30 RX ADMIN — MAGNESIUM GLUCONATE 500 MG ORAL TABLET 400 MG: 500 TABLET ORAL at 20:34

## 2019-07-30 RX ADMIN — POTASSIUM CHLORIDE 20 MEQ: 1.5 POWDER, FOR SOLUTION ORAL at 20:32

## 2019-07-30 RX ADMIN — Medication 100 MG: at 08:58

## 2019-07-30 RX ADMIN — TRAZODONE HYDROCHLORIDE 100 MG: 100 TABLET ORAL at 20:34

## 2019-07-30 RX ADMIN — CYCLOBENZAPRINE 5 MG: 10 TABLET, FILM COATED ORAL at 08:58

## 2019-07-30 RX ADMIN — CLONIDINE HYDROCHLORIDE 0.1 MG: 0.2 TABLET ORAL at 11:27

## 2019-07-30 RX ADMIN — Medication 10 ML: at 09:00

## 2019-07-30 RX ADMIN — PROPRANOLOL HYDROCHLORIDE 10 MG: 10 TABLET ORAL at 12:47

## 2019-07-30 RX ADMIN — SODIUM CHLORIDE, POTASSIUM CHLORIDE, SODIUM LACTATE AND CALCIUM CHLORIDE 1000 ML: 600; 310; 30; 20 INJECTION, SOLUTION INTRAVENOUS at 01:16

## 2019-07-30 RX ADMIN — CYCLOBENZAPRINE 5 MG: 10 TABLET, FILM COATED ORAL at 01:35

## 2019-07-30 RX ADMIN — QUETIAPINE FUMARATE 100 MG: 100 TABLET ORAL at 20:33

## 2019-07-30 RX ADMIN — GABAPENTIN 900 MG: 300 CAPSULE ORAL at 14:22

## 2019-07-30 RX ADMIN — ACETAMINOPHEN 1000 MG: 500 TABLET ORAL at 20:34

## 2019-07-30 RX ADMIN — CLONIDINE HYDROCHLORIDE 0.1 MG: 0.1 TABLET ORAL at 20:33

## 2019-07-30 RX ADMIN — Medication 100 MG: at 20:33

## 2019-07-30 RX ADMIN — DOCUSATE SODIUM 100 MG: 100 CAPSULE, LIQUID FILLED ORAL at 20:34

## 2019-07-30 RX ADMIN — ENOXAPARIN SODIUM 30 MG: 100 INJECTION SUBCUTANEOUS at 08:59

## 2019-07-30 ASSESSMENT — PAIN SCALES - GENERAL
PAINLEVEL_OUTOF10: 0
PAINLEVEL_OUTOF10: 6
PAINLEVEL_OUTOF10: 0
PAINLEVEL_OUTOF10: 0

## 2019-07-30 ASSESSMENT — PAIN SCALES - WONG BAKER
WONGBAKER_NUMERICALRESPONSE: 0
WONGBAKER_NUMERICALRESPONSE: 6

## 2019-07-30 NOTE — PROGRESS NOTES
Speech Language Pathology  [facility]  Speech Language Pathology    Date: 7/30/2019  Patient Name: Buck Rice  YOB: 1960   AGE: 63 y.o. MRN: [id number]        Patient Not Available for Speech Therapy     Due to:  [] Testing  [] Hemodialysis  [] Cancelled by RN  [] Surgery   [] Intubation/Sedation/Pain Medication  [] Medical instability  [x] Other: Pt. In restraints and not appropriate for an MBSS on this date    Next scheduled treatment:  7/31/19  Completed by:  YEHUDA Scales

## 2019-07-30 NOTE — PLAN OF CARE
Problem: Pain:  Description  Pain management should include both nonpharmacologic and pharmacologic interventions.   Goal: Pain level will decrease  Description  Pain level will decrease   Outcome: Ongoing  Goal: Control of acute pain  Description  Control of acute pain   Outcome: Ongoing  Goal: Control of chronic pain  Description  Control of chronic pain   Outcome: Ongoing     Problem: Falls - Risk of:  Goal: Will remain free from falls  Description  Will remain free from falls   7/30/2019 1231 by Ailyn Uriostegui RN  Outcome: Ongoing  7/30/2019 0720 by Kam Garcia RN  Outcome: Ongoing  Goal: Absence of physical injury  Description  Absence of physical injury   Outcome: Ongoing     Problem: Confusion - Acute:  Goal: Absence of continued neurological deterioration signs and symptoms  Description  Absence of continued neurological deterioration signs and symptoms  Outcome: Ongoing  Goal: Mental status will be restored to baseline  Description  Mental status will be restored to baseline  7/30/2019 1231 by Ailyn Uriostegui RN  Outcome: Ongoing  7/30/2019 0720 by Kam Garcia RN  Outcome: Ongoing     Problem: Discharge Planning:  Goal: Ability to perform activities of daily living will improve  Description  Ability to perform activities of daily living will improve  Outcome: Ongoing  Goal: Participates in care planning  Description  Participates in care planning  Outcome: Ongoing     Problem: Injury - Risk of, Physical Injury:  Goal: Will remain free from falls  Description  Will remain free from falls   7/30/2019 1231 by Ailyn Uriostegui RN  Outcome: Ongoing  7/30/2019 0720 by Kam Garcia RN  Outcome: Ongoing  Goal: Absence of physical injury  Description  Absence of physical injury   Outcome: Ongoing     Problem: Mood - Altered:  Goal: Mood stable  Description  Mood stable  Outcome: Ongoing  Goal: Absence of abusive behavior  Description  Absence of abusive behavior  Outcome: Ongoing  Goal: Verbalizations of feeling emotionally comfortable while being cared for will increase  Description  Verbalizations of feeling emotionally comfortable while being cared for will increase  Outcome: Ongoing     Problem: Psychomotor Activity - Altered:  Goal: Absence of psychomotor disturbance signs and symptoms  Description  Absence of psychomotor disturbance signs and symptoms  Outcome: Ongoing     Problem: Sensory Perception - Impaired:  Goal: Demonstrations of improved sensory functioning will increase  Description  Demonstrations of improved sensory functioning will increase  Outcome: Ongoing  Goal: Decrease in sensory misperception frequency  Description  Decrease in sensory misperception frequency  Outcome: Ongoing  Goal: Able to refrain from responding to false sensory perceptions  Description  Able to refrain from responding to false sensory perceptions  Outcome: Ongoing  Goal: Demonstrates accurate environmental perceptions  Description  Demonstrates accurate environmental perceptions  Outcome: Ongoing  Goal: Able to distinguish between reality-based and nonreality-based thinking  Description  Able to distinguish between reality-based and nonreality-based thinking  Outcome: Ongoing  Goal: Able to interrupt nonreality-based thinking  Description  Able to interrupt nonreality-based thinking  Outcome: Ongoing     Problem: Sleep Pattern Disturbance:  Goal: Appears well-rested  Description  Appears well-rested  Outcome: Ongoing     Problem: OXYGENATION/RESPIRATORY FUNCTION  Goal: Patient will maintain patent airway  Outcome: Ongoing  Goal: Patient will achieve/maintain normal respiratory rate/effort  Description  Respiratory rate and effort will be within normal limits for the patient   Outcome: Ongoing     Problem: SKIN INTEGRITY  Goal: Skin integrity is maintained or improved  Outcome: Ongoing     Problem: Nutrition  Goal: Optimal nutrition therapy  Outcome: Ongoing     Problem: Restraint Use - Nonviolent/Non-Self-Destructive

## 2019-07-31 ENCOUNTER — APPOINTMENT (OUTPATIENT)
Dept: GENERAL RADIOLOGY | Age: 59
DRG: 957 | End: 2019-07-31
Payer: COMMERCIAL

## 2019-07-31 LAB
ABSOLUTE EOS #: 0.22 K/UL (ref 0–0.44)
ABSOLUTE IMMATURE GRANULOCYTE: 0.22 K/UL (ref 0–0.3)
ABSOLUTE LYMPH #: 0.87 K/UL (ref 1.1–3.7)
ABSOLUTE MONO #: 1.95 K/UL (ref 0.1–1.2)
BASOPHILS # BLD: 1 % (ref 0–2)
BASOPHILS ABSOLUTE: 0.22 K/UL (ref 0–0.2)
DIFFERENTIAL TYPE: ABNORMAL
EOSINOPHILS RELATIVE PERCENT: 1 % (ref 1–4)
GLUCOSE BLD-MCNC: 109 MG/DL (ref 75–110)
GLUCOSE BLD-MCNC: 110 MG/DL (ref 75–110)
GLUCOSE BLD-MCNC: 117 MG/DL (ref 75–110)
GLUCOSE BLD-MCNC: 139 MG/DL (ref 75–110)
HCT VFR BLD CALC: 28.5 % (ref 40.7–50.3)
HEMOGLOBIN: 8.9 G/DL (ref 13–17)
IMMATURE GRANULOCYTES: 1 %
LYMPHOCYTES # BLD: 4 % (ref 24–43)
MCH RBC QN AUTO: 29.8 PG (ref 25.2–33.5)
MCHC RBC AUTO-ENTMCNC: 31.2 G/DL (ref 28.4–34.8)
MCV RBC AUTO: 95.3 FL (ref 82.6–102.9)
MONOCYTES # BLD: 9 % (ref 3–12)
MORPHOLOGY: NORMAL
NRBC AUTOMATED: 0 PER 100 WBC
PDW BLD-RTO: 13.6 % (ref 11.8–14.4)
PLATELET # BLD: 913 K/UL (ref 138–453)
PLATELET ESTIMATE: ABNORMAL
PMV BLD AUTO: 9.2 FL (ref 8.1–13.5)
RBC # BLD: 2.99 M/UL (ref 4.21–5.77)
RBC # BLD: ABNORMAL 10*6/UL
SEG NEUTROPHILS: 84 % (ref 36–65)
SEGMENTED NEUTROPHILS ABSOLUTE COUNT: 18.22 K/UL (ref 1.5–8.1)
WBC # BLD: 21.7 K/UL (ref 3.5–11.3)
WBC # BLD: ABNORMAL 10*3/UL

## 2019-07-31 PROCEDURE — 6370000000 HC RX 637 (ALT 250 FOR IP): Performed by: STUDENT IN AN ORGANIZED HEALTH CARE EDUCATION/TRAINING PROGRAM

## 2019-07-31 PROCEDURE — 6360000002 HC RX W HCPCS: Performed by: STUDENT IN AN ORGANIZED HEALTH CARE EDUCATION/TRAINING PROGRAM

## 2019-07-31 PROCEDURE — 2580000003 HC RX 258: Performed by: STUDENT IN AN ORGANIZED HEALTH CARE EDUCATION/TRAINING PROGRAM

## 2019-07-31 PROCEDURE — 71045 X-RAY EXAM CHEST 1 VIEW: CPT

## 2019-07-31 PROCEDURE — 2060000000 HC ICU INTERMEDIATE R&B

## 2019-07-31 PROCEDURE — 93005 ELECTROCARDIOGRAM TRACING: CPT

## 2019-07-31 PROCEDURE — 74230 X-RAY XM SWLNG FUNCJ C+: CPT

## 2019-07-31 PROCEDURE — 92611 MOTION FLUOROSCOPY/SWALLOW: CPT

## 2019-07-31 PROCEDURE — 82947 ASSAY GLUCOSE BLOOD QUANT: CPT

## 2019-07-31 PROCEDURE — 31720 CLEARANCE OF AIRWAYS: CPT

## 2019-07-31 PROCEDURE — 36415 COLL VENOUS BLD VENIPUNCTURE: CPT

## 2019-07-31 PROCEDURE — 85025 COMPLETE CBC W/AUTO DIFF WBC: CPT

## 2019-07-31 RX ORDER — PROPRANOLOL HYDROCHLORIDE 10 MG/1
10 TABLET ORAL ONCE
Status: COMPLETED | OUTPATIENT
Start: 2019-07-31 | End: 2019-07-31

## 2019-07-31 RX ORDER — SODIUM CHLORIDE, SODIUM LACTATE, POTASSIUM CHLORIDE, AND CALCIUM CHLORIDE .6; .31; .03; .02 G/100ML; G/100ML; G/100ML; G/100ML
500 INJECTION, SOLUTION INTRAVENOUS ONCE
Status: COMPLETED | OUTPATIENT
Start: 2019-07-31 | End: 2019-07-31

## 2019-07-31 RX ORDER — UREA 10 %
2 LOTION (ML) TOPICAL NIGHTLY
Status: DISCONTINUED | OUTPATIENT
Start: 2019-07-31 | End: 2019-08-08

## 2019-07-31 RX ADMIN — Medication 2 MG: at 21:36

## 2019-07-31 RX ADMIN — CLONIDINE HYDROCHLORIDE 0.1 MG: 0.1 TABLET ORAL at 21:34

## 2019-07-31 RX ADMIN — ENOXAPARIN SODIUM 30 MG: 100 INJECTION SUBCUTANEOUS at 09:56

## 2019-07-31 RX ADMIN — Medication 100 MG: at 09:57

## 2019-07-31 RX ADMIN — CLONIDINE HYDROCHLORIDE 0.1 MG: 0.1 TABLET ORAL at 09:57

## 2019-07-31 RX ADMIN — QUETIAPINE FUMARATE 100 MG: 100 TABLET ORAL at 21:33

## 2019-07-31 RX ADMIN — SODIUM CHLORIDE, POTASSIUM CHLORIDE, SODIUM LACTATE AND CALCIUM CHLORIDE: 600; 310; 30; 20 INJECTION, SOLUTION INTRAVENOUS at 22:38

## 2019-07-31 RX ADMIN — POTASSIUM CHLORIDE 20 MEQ: 1.5 POWDER, FOR SOLUTION ORAL at 21:36

## 2019-07-31 RX ADMIN — Medication 100 MG: at 21:33

## 2019-07-31 RX ADMIN — THERA TABS 1 TABLET: TAB at 09:57

## 2019-07-31 RX ADMIN — MAGNESIUM GLUCONATE 500 MG ORAL TABLET 400 MG: 500 TABLET ORAL at 21:33

## 2019-07-31 RX ADMIN — ACETAMINOPHEN 1000 MG: 500 TABLET ORAL at 03:32

## 2019-07-31 RX ADMIN — PIPERACILLIN SODIUM AND TAZOBACTAM SODIUM 4.5 G: 4; .5 INJECTION, POWDER, LYOPHILIZED, FOR SOLUTION INTRAVENOUS at 17:16

## 2019-07-31 RX ADMIN — TRAZODONE HYDROCHLORIDE 100 MG: 100 TABLET ORAL at 21:34

## 2019-07-31 RX ADMIN — GABAPENTIN 900 MG: 300 CAPSULE ORAL at 21:34

## 2019-07-31 RX ADMIN — PIPERACILLIN SODIUM AND TAZOBACTAM SODIUM 4.5 G: 4; .5 INJECTION, POWDER, LYOPHILIZED, FOR SOLUTION INTRAVENOUS at 00:22

## 2019-07-31 RX ADMIN — FOLIC ACID 1 MG: 1 TABLET ORAL at 09:57

## 2019-07-31 RX ADMIN — ONDANSETRON 4 MG: 2 INJECTION INTRAMUSCULAR; INTRAVENOUS at 22:38

## 2019-07-31 RX ADMIN — ACETAMINOPHEN 1000 MG: 500 TABLET ORAL at 11:14

## 2019-07-31 RX ADMIN — QUETIAPINE FUMARATE 100 MG: 100 TABLET ORAL at 09:56

## 2019-07-31 RX ADMIN — POTASSIUM CHLORIDE 20 MEQ: 1.5 POWDER, FOR SOLUTION ORAL at 09:30

## 2019-07-31 RX ADMIN — OXYCODONE HYDROCHLORIDE 5 MG: 5 TABLET ORAL at 03:44

## 2019-07-31 RX ADMIN — VANCOMYCIN HYDROCHLORIDE 1000 MG: 1 INJECTION, SOLUTION INTRAVENOUS at 04:37

## 2019-07-31 RX ADMIN — SODIUM CHLORIDE, POTASSIUM CHLORIDE, SODIUM LACTATE AND CALCIUM CHLORIDE 500 ML: 600; 310; 30; 20 INJECTION, SOLUTION INTRAVENOUS at 12:36

## 2019-07-31 RX ADMIN — VANCOMYCIN HYDROCHLORIDE 1000 MG: 1 INJECTION, SOLUTION INTRAVENOUS at 17:16

## 2019-07-31 RX ADMIN — ACETAMINOPHEN 1000 MG: 500 TABLET ORAL at 21:33

## 2019-07-31 RX ADMIN — MAGNESIUM GLUCONATE 500 MG ORAL TABLET 400 MG: 500 TABLET ORAL at 09:57

## 2019-07-31 RX ADMIN — PROPRANOLOL HYDROCHLORIDE 10 MG: 10 TABLET ORAL at 11:14

## 2019-07-31 RX ADMIN — GABAPENTIN 900 MG: 300 CAPSULE ORAL at 16:40

## 2019-07-31 RX ADMIN — GABAPENTIN 900 MG: 300 CAPSULE ORAL at 09:56

## 2019-07-31 RX ADMIN — PIPERACILLIN SODIUM AND TAZOBACTAM SODIUM 4.5 G: 4; .5 INJECTION, POWDER, LYOPHILIZED, FOR SOLUTION INTRAVENOUS at 09:56

## 2019-07-31 ASSESSMENT — PAIN SCALES - GENERAL
PAINLEVEL_OUTOF10: 0
PAINLEVEL_OUTOF10: 5
PAINLEVEL_OUTOF10: 0
PAINLEVEL_OUTOF10: 0

## 2019-07-31 ASSESSMENT — PAIN - FUNCTIONAL ASSESSMENT: PAIN_FUNCTIONAL_ASSESSMENT: 0-10

## 2019-07-31 NOTE — PROGRESS NOTES
PROGRESS NOTE          PATIENT NAME: Kriss [facility] RECORD NO. [id number]  DATE: 7/31/2019  SURGEON: Joann Madison  PRIMARY CARE PHYSICIAN: Keya Connell MD    HD: # [id number]    ASSESSMENT    Patient Active Problem List   Diagnosis    Fall    Closed fracture of multiple ribs of right side    Contusion of right lung    Fracture of transverse process of thoracic vertebra ([address]    Hemothorax on right    Pneumothorax    Closed fracture of right iliac wing (HCC)    Hypokalemia    Fx dorsal vertebra-closed ([address]    Traumatic closed fracture of distal clavicle with minimal displacement, left, initial encounter       [facility]    1. Neuro  1. Pain control: cherie 900mg Q8H, tylenol mayra 10mg Q4H, flexeril D/C's due to altered mental status. 2. Fent PRN  3. Seroquel 100mg BID     2. CV  1. Hypertensive into the 180's, now down to 90/60. Given 500 ml LR bolus   2. Propranolol was stopped yesterday due to low BP, weaning patient's clonidine. Due to bout of hypertension this morning one 10mg propranolol was give. 3. Pulm  1. R 1st-12th rib fractures, R small hemo/pneumo, pulm contusion  2. Extubated 7/26  3. Patient is too disoriented to perform incentive spirometry, have been following up clinically with auscultation and with x-rays when patient has an acute change or chest tube has been pulled. 4. Patient's final chest tube pulled today at 1400, follow up imaging at 1800.      4. Renal  1. BUN:Cr    24/1.03 on 7/30  5.   GI  1. Tube feeds were stopped at one point last night due to gurgling and vomitus. Chest tube advanced secondary to imaging, tube feeds restarted    2. Colace, Mag-Ox  3. NGT placed 7/26, barium swallow study planned for today      6.   ID:  1.  White count down from up from 20.1 to 21.7  2. 7.   Tubes/Lines  1. DC CT #1     7.   MSK        - R comminuted iliac wing fx and left distal clavicular fx                    -Partial WBAT RLE      SUBJECTIVE    Jordyn Morales is (Obturator and Iliac Views), thank you Trauma/Fracture FINDINGS: There is a mildly comminuted and nondisplaced fracture of the right iliac wing, better visualized on previous CT. There is no visible fracture line extension to the right acetabulum or right ilioischial line. There is mild joint space narrowing and marginal spurring in the hips, more so on the right. Degenerative changes are noted in the lower lumbar spine. Mildly comminuted and nondisplaced fractures of the right iliac wing, better visualized on previous CT. No visible fracture line extension to the right acetabulum. Xr Pelvis (1-2 Views)    Result Date: 7/15/2019  EXAMINATION: ONE XRAY VIEW OF THE PELVIS 7/15/2019 4:38 am COMPARISON: CT abdomen pelvis done earlier same day. HISTORY: ORDERING SYSTEM PROVIDED HISTORY: Trauma/Fracture TECHNOLOGIST PROVIDED HISTORY: AP, Inlet and Outlet views please, thank you. Trauma/Fracture Reason for Exam: trauma/fx. Acuity: Acute Type of Exam: Initial FINDINGS: Single frontal view of the pelvis. Acute nondisplaced fracture of the right iliac wing is better seen on CT done earlier same day. No other acute fracture is identified. No aggressive skeletal lesion. Normal alignment of the bilateral sacroiliac joints, hip joints and symphysis pubis. Excreted contrast material in the urinary bladder. Acute nondisplaced fracture of the right iliac wing is better seen on CT done earlier same day. No other acute fracture is identified. Xr Clavicle Left    Result Date: 7/25/2019  EXAMINATION: TWO XRAY VIEWS OF THE LEFT CLAVICLE 7/25/2019 9:09 am COMPARISON: 07/16/2019 HISTORY: ORDERING SYSTEM PROVIDED HISTORY: Trauma/Fracture TECHNOLOGIST PROVIDED HISTORY: Trauma/Fracture 61-year-old male with left clavicular fracture FINDINGS: Again seen is a slightly displaced distal left clavicular fracture, similar in appearance and alignment compared with 07/16/2019.  Mild degenerative change of the left Multiplanar reformatted images are provided for review. Dose modulation, iterative reconstruction, and/or weight based adjustment of the mA/kV was utilized to reduce the radiation dose to as low as reasonably achievable. COMPARISON: Chest radiograph today. Chest CT 07/15/2019. HISTORY: ORDERING SYSTEM PROVIDED HISTORY: CHEST TRAUMA, BLUNT, HIGH ENERGY, FIRST EXAM TECHNOLOGIST PROVIDED HISTORY: This replaces the contrasted study thansk FINDINGS: Mediastinum: The endotracheal tube terminates in appropriate position above the ursula. The enteric tube terminates at the distal esophagus. The heart and great vessels appear normal in size. Sequela of old granulomatous disease. No pericardial effusion. Lungs/pleura: Right chest tube in place terminating posteriorly at the apex. The chest tube is opacified. Small anterior apical right pneumothorax. Scattered ground-glass and interstitial opacities are present in the posterior right upper lobe. Airspace disease in the right lung is noted consistent with a combination of compressive atelectasis and inflammatory change. Complex right pleural fluid is noted, partially loculated posterior medially as well as anteromedially at the apex. High-density blood products are noted laterally that abut the displaced rib fractures. Trace left pleural fluid is noted with dependent atelectasis and scattered areas of ground-glass opacities in the left lung apex. These focal opacities in the apex are new findings since the initial CT exam.  No pneumothorax on the left. Calcified granuloma in the left upper lobe. Upper Abdomen: No acute findings. Soft Tissues/Bones: Redemonstration of multiple right rib fractures involving the 1st-11th ribs. The entirety of the 12th rib is not visualized on this exam..  The 8th, 9th and 10th ribs are notably displaced. Redemonstration of right transverse process fractures at multiple levels involving T2, T3, T4, T6 and T9.   Findings of old trauma fractures of the right T2, T3, T4, T6 and T9 right transverse processes. 4.  Acute, comminuted and nondisplaced fracture of the right iliac wing. 5.  No solid abdominal organ injury. 6.  Mild multilevel degenerative disease in the thoracic and lumbar spine. Ct Lumbar Spine Wo Contrast    Result Date: 7/15/2019  EXAMINATION: CT OF THE CHEST, ABDOMEN, AND PELVIS WITH CONTRAST; CT OF THE LUMBAR SPINE WITHOUT CONTRAST; CT OF THE THORACIC SPINE WITHOUT CONTRAST 7/15/2019 1:45 am TECHNIQUE: CT of the chest, abdomen and pelvis was performed with the administration of intravenous contrast. Multiplanar reformatted images are provided for review. Dose modulation, iterative reconstruction, and/or weight based adjustment of the mA/kV was utilized to reduce the radiation dose to as low as reasonably achievable.; CT of the lumbar spine was performed without the administration of intravenous contrast. Multiplanar reformatted images are provided for review. Dose modulation, iterative reconstruction, and/or weight based adjustment of the mA/kV was utilized to reduce the radiation dose to as low as reasonably achievable.; CT of the thoracic spine was performed without the administration of intravenous contrast. Multiplanar reformatted images are provided for review. Dose modulation, iterative reconstruction, and/or weight based adjustment of the mA/kV was utilized to reduce the radiation dose to as low as reasonably achievable. COMPARISON: None HISTORY: ORDERING SYSTEM PROVIDED HISTORY: fall TECHNOLOGIST PROVIDED HISTORY: ; ORDERING SYSTEM PROVIDED HISTORY: fall TECHNOLOGIST PROVIDED HISTORY: fall; ORDERING SYSTEM PROVIDED HISTORY: fall FINDINGS: Chest: Mediastinum: Normal heart and pericardium. Minimal atherosclerosis of the thoracic aorta without evidence of acute injury. No periaortic hemorrhage. Normal caliber main pulmonary artery.   Small calcified bilateral hilar/perihilar lymph nodes suggestive of remote granulomatous Right-sided chest tube and postsurgical changes status post ORIF multiple right-sided rib fractures; overlying staples. Parenchymal and pleural findings right lung unchanged. Minimal atelectasis or scarring left base. Cardiomediastinal shadow on bony structures stable. Stable findings. Enteric tube should be advanced 5 cm. Xr Chest Portable    Result Date: 7/29/2019  EXAMINATION: ONE XRAY VIEW OF THE CHEST 7/29/2019 6:56 am COMPARISON: July 28, 2019 HISTORY: ORDERING SYSTEM PROVIDED HISTORY: right chest tube TECHNOLOGIST PROVIDED HISTORY: right chest tube Ongoing evaluation. FINDINGS: Lines and tubes are stable. Heart size and mediastinal contours are unchanged. The lungs are stable. Redemonstration multiple right-sided rib fractures. No change. Xr Chest Portable    Result Date: 7/28/2019  EXAMINATION: ONE XRAY VIEW OF THE CHEST 7/28/2019 9:59 pm COMPARISON: 13 hours ago HISTORY: ORDERING SYSTEM PROVIDED HISTORY: one of two chest tubes removed TECHNOLOGIST PROVIDED HISTORY: one of two chest tubes removed Reason for Exam: One chest tube removed FINDINGS: Enteric tube in the stomach. Chest tube terminates near the right upper lung field. No pneumothorax. Right interstitial infiltrate and small effusion unchanged. Chest tube appears to been removed from the right lung base. Left lung is clear. Heart and mediastinum normal.  Multiple rib fractures on the right. 4 metallic plates right hemithorax and skin staples again noted. Basilar chest tube appears to been removed. No change right apical chest tube. No pneumothorax. Small right effusion and interstitial infiltrate unchanged.      Xr Chest Portable    Result Date: 7/28/2019  EXAMINATION: ONE XRAY VIEW OF THE CHEST 7/28/2019 8:02 am COMPARISON: July 27, 2019, July 26, 2019 HISTORY: ORDERING SYSTEM PROVIDED HISTORY: chest tube to water seal TECHNOLOGIST PROVIDED HISTORY: chest tube to water seal FINDINGS: Postoperative findings in the fracture ORIF is again noted. Subcutaneous gas is seen along the right axilla and right chest wall with overlying skin staples. Left lung remains relatively clear. Stable diffuse airspace disease throughout the right lung. Stable small right-sided pleural effusion and scarring/atelectasis at the right lung base. Visualized osseous structures similar in appearance compared with the prior study. Multiple right-sided rib fracture deformities. No free air. No large obvious pneumothorax. Overall, relatively stable portable chest compared with 07/25/2019, 519 hours. Xr Chest Portable    Result Date: 7/25/2019  EXAMINATION: ONE XRAY VIEW OF THE CHEST 7/25/2019 6:24 am COMPARISON: July 24, 2019 HISTORY: ORDERING SYSTEM PROVIDED HISTORY: Intubated, right chest tube TECHNOLOGIST PROVIDED HISTORY: Intubated, right chest tube Reason for Exam: supine port FINDINGS: Endotracheal tube with tip 6.5 cm from the ursula. Right central venous catheter with tip projecting in the region of the mid SVC. No significant change in right-sided chest tubes. Postsurgical changes from internal fixation of right rib fractures. Subcutaneous emphysema along the right chest again noted. Left lung is clear. Slight improvement aeration of the right lung. No significant change otherwise. Slight improvement in aeration of the right lung. No significant change otherwise. Xr Chest Portable    Result Date: 7/24/2019  EXAMINATION: ONE XRAY VIEW OF THE CHEST 7/24/2019 9:23 pm COMPARISON: 5 hours ago HISTORY: ORDERING SYSTEM PROVIDED HISTORY: advancement of ETT TECHNOLOGIST PROVIDED HISTORY: advancement of ETT FINDINGS: ET and enteric tubes unchanged. Right subclavian central line unchanged. 3 chest tubes on the right unchanged. Plates and screws transfix 4 ribs. Volume loss on the right and pleuroparenchymal reaction. No pneumothorax. Left lung clear. Bony thorax otherwise intact. Skin staples on the right.  Subcutaneous gas on the right. Moderate interstitial infiltrates and pleuroparenchymal reaction on the right unchanged. No pneumothorax. Life support apparatus unchanged. Hardware right ribs. Xr Chest Portable    Result Date: 7/24/2019  EXAMINATION: ONE XRAY VIEW OF THE CHEST 7/24/2019 3:58 pm COMPARISON: AP chest from earlier the same day at 14:17 HISTORY: ORDERING SYSTEM PROVIDED HISTORY: S/p central line, OR TECHNOLOGIST PROVIDED HISTORY: S/p central line, OR FINDINGS: Again noted is a right IJ catheter with its tip in the SVC, unchanged. ETT tip stable at the sternoclavicular joint level. Enteric tube tip and side hole project below the left hemidiaphragm. Multiple right-sided chest tubes, rib fixation hardware, and overlying staples. Unchanged mild subcutaneous air. Cardiomediastinal shadow stable. Better aeration left lung with a few scattered calcified granulomas. Focal densities right lung with persistent volume loss and small pleural effusion, similar by comparison. No pneumothorax. Multiple additional right-sided rib and left clavicular fractures, unchanged. Stable findings, including right IJ central line. No pneumothorax. Stable postoperative changes right hemithorax with airspace and mild pleural disease; additional stable findings, as above. Xr Chest Portable    Result Date: 7/24/2019  EXAMINATION: ONE XRAY VIEW OF THE CHEST 7/24/2019 6:08 am COMPARISON: 07/23/2019, 544 hours HISTORY: ORDERING SYSTEM PROVIDED HISTORY: Intubated, right chest tube TECHNOLOGIST PROVIDED HISTORY: Intubated, right chest tube Reason for Exam: uprt port 61-year-old male who intubated; right-sided chest tube FINDINGS: Portable upright view of the chest. Endotracheal tube distal tip overlying the upper trachea approximately 8.2 cm above the level of the ursula. Enteric tube extends towards the GE junction with distal tip excluded from the field of view.  Right-sided chest tube projects over the right upper lung zone, stable. Cardiac monitor leads overlie the chest. No large obvious pneumothorax. No free air. Stable small bilateral pleural effusions, right greater than left. Hazy ground-glass opacity within the right lung, similar to the prior study. Calcified granuloma projects over the left mid lung zone. Right-sided rib fractures remain unchanged. Left-sided distal clavicular fracture again noted. 1. Stable appearance of airspace disease throughout the right lung. Stable small bilateral pleural effusions, right greater than left. 2. Right-sided rib fractures and distal left clavicular fracture again noted. 3. Endotracheal, enteric and right-sided chest tube as above. Xr Chest Portable    Result Date: 7/23/2019  EXAMINATION: ONE XRAY VIEW OF THE CHEST 7/23/2019 5:55 am COMPARISON: July 22, 2019 HISTORY: ORDERING SYSTEM PROVIDED HISTORY: Intubated, right chest tube TECHNOLOGIST PROVIDED HISTORY: Intubated, right chest tube FINDINGS: Endotracheal tube, nasogastric tube, and right-sided chest tubes appear in unchanged position. Cardiac and mediastinal contours are unchanged. Unchanged right pleural effusion with mild diffuse pulmonary opacification of the right hemithorax. Multifocal right lung pulmonary opacities appear unchanged. Left lung remains relatively clear. No evidence of a pneumothorax. No new osseous abnormalities are identified. Right-sided rib fractures appear unchanged. 1. No significant interval change since July 22, 2019. 2. Persistent right pleural effusion with diffuse opacification of right hemithorax. Multifocal right lung pulmonary opacities appear unchanged. RECOMMENDATION: Continued radiographic follow-up.      Xr Chest Portable    Result Date: 7/22/2019  EXAMINATION: ONE XRAY VIEW OF THE CHEST 7/22/2019 6:24 am COMPARISON: 07/21/2019, 629 hours HISTORY: ORDERING SYSTEM PROVIDED HISTORY: Intubated, right chest tube TECHNOLOGIST PROVIDED HISTORY: Intubated, right chest tube Reason L5 pars interarticularis defects. No destructive osseous lesion. Mild multilevel degenerative disc and facet joint disease. No definite severe lumbar spinal canal stenosis. Mild-to-moderate bilateral L4-L5 neural foraminal narrowing. Bilateral sacroiliac joint degeneration. 1.  Multiple acute right-sided rib fractures with surrounding small amount of chest wall hemorrhage and gas (grade 2). Small right-sided hemothorax. Trace right pneumothorax. 2.   Patchy and heterogeneous opacities with consolidation throughout the right lung, most pronounced in the right lower lobe and posterior right upper lobe. Findings likely represent a combination of atelectasis and pulmonary contusion (grade 3). 3.  Acute nondisplaced fractures of the right T2, T3, T4, T6 and T9 right transverse processes. 4.  Acute, comminuted and nondisplaced fracture of the right iliac wing. 5.  No solid abdominal organ injury. 6.  Mild multilevel degenerative disease in the thoracic and lumbar spine. Fl Modified Barium Swallow W Video    Result Date: 2019  DAILY Ivy     2019  3:24 PM INSTRUMENTAL SWALLOW REPORT MODIFIED BARIUM SWALLOW NAME: Owen Marrero   : 1960 MRN: [id number]   Date of Eval: 2019      Referring Diagnosis(es):  Past Medical History:  has a past medical history of Hemochromatosis and Hypertension. Past Surgical History:  has a past surgical history that includes thoracotomy (Right, 2019) and sternum separation repair (Right, 2019).  Current Diet Solid Consistency: NPO Current Diet Liquid Consistency: NPO  Type of Study: Initial MBS  Recent CXR: (  19  ) Impression Minimal clearing of right lung opacities.     Patient Complaints/Reason for Referral: Owen Marrero was referred for a MBS to assess the efficiency of his swallow function, assess for aspiration, and to make recommendations regarding safe dietary consistencies, effective compensatory strategies, and safe eating

## 2019-07-31 NOTE — PLAN OF CARE
Hi Cerda RN  Outcome: Ongoing  7/31/2019 0528 by Leny De La Torre RN  Outcome: Ongoing     Problem: Injury - Risk of, Physical Injury:  Goal: Will remain free from falls  Description  Will remain free from falls   7/31/2019 1820 by Hi Cerda RN  Outcome: Ongoing  7/31/2019 0528 by Leny De La Torre RN  Outcome: Ongoing  Goal: Absence of physical injury  Description  Absence of physical injury   7/31/2019 1820 by Hi Cerda RN  Outcome: Ongoing  7/31/2019 0528 by Leny De La Torre RN  Outcome: Ongoing     Problem: Mood - Altered:  Goal: Mood stable  Description  Mood stable  7/31/2019 1820 by Hi Cerda RN  Outcome: Ongoing  7/31/2019 0528 by Leny De La Torre RN  Outcome: Ongoing  Goal: Absence of abusive behavior  Description  Absence of abusive behavior  7/31/2019 1820 by Hi Cerda RN  Outcome: Ongoing  7/31/2019 0528 by Leny De La Torre RN  Outcome: Ongoing  Goal: Verbalizations of feeling emotionally comfortable while being cared for will increase  Description  Verbalizations of feeling emotionally comfortable while being cared for will increase  7/31/2019 1820 by Hi Cerda RN  Outcome: Ongoing  7/31/2019 0528 by Leny De La Torre RN  Outcome: Ongoing     Problem: Psychomotor Activity - Altered:  Goal: Absence of psychomotor disturbance signs and symptoms  Description  Absence of psychomotor disturbance signs and symptoms  7/31/2019 1820 by Hi Cerda RN  Outcome: Ongoing  7/31/2019 0528 by Leny De La Torre RN  Outcome: Ongoing     Problem: Sensory Perception - Impaired:  Goal: Demonstrations of improved sensory functioning will increase  Description  Demonstrations of improved sensory functioning will increase  7/31/2019 1820 by Hi Cerda RN  Outcome: Ongoing  7/31/2019 0528 by Leny De La Torre RN  Outcome: Ongoing  Goal: Decrease in sensory misperception frequency  Description  Decrease in sensory misperception frequency  7/31/2019 1820 by Hi Cerda RN  Outcome: Ongoing  7/31/2019 0528 by Radha Morris

## 2019-07-31 NOTE — PROCEDURES
INSTRUMENTAL SWALLOW REPORT  MODIFIED BARIUM SWALLOW    NAME: Zahraa Arroyo   : 1960  MRN: [id number]       Date of Eval: 2019              Referring Diagnosis(es):      Past Medical History:  has a past medical history of Hemochromatosis and Hypertension. Past Surgical History:  has a past surgical history that includes thoracotomy (Right, 2019) and sternum separation repair (Right, 2019). Current Diet Solid Consistency: NPO  Current Diet Liquid Consistency: NPO       Type of Study: Initial MBS       Recent CXR: (  19  )    Impression   Minimal clearing of right lung opacities.               Patient Complaints/Reason for Referral:  Zahraa Arroyo was referred for a MBS to assess the efficiency of his swallow function, assess for aspiration, and to make recommendations regarding safe dietary consistencies, effective compensatory strategies, and safe eating environment. Onset of problem: Per chart, Zahraa Arroyo is a 63 y.o. male that presented to the Emergency Department following a fall estimated 6-10ft while at [facility]. Patient was flown in via life flight. Admits to drinking alcohol all day. + LOC, + Incontinence. GCS 14 at scene for eye opening. Denies being in pain, clinically intoxicated. Pt denies PMH, daily medication use, AC/AP use, Allergies, last meal this morning. Initial GCS 15, In trauma bay pt initially /50s, decreased to 88/58 before NS was pressure bagged. O2 sat initially lower 90s, desat to upper 80s with good waveform, placed on nonrebreather with improvement to upper 90s SpO2. Behavior/Cognition/Vision/Hearing:  Behavior/Cognition: Confused; Agitated; Requires cueing;Distractible  Vision: Impaired  Vision Exceptions: Wears glasses for reading  Hearing: Within functional limits    Impressions:  Recommend pt remain NPO and medications via alternative means of nutrition. Pt with +wet/gurgly cough and voice quality before PO intake.  Pt

## 2019-08-01 ENCOUNTER — APPOINTMENT (OUTPATIENT)
Dept: CT IMAGING | Age: 59
DRG: 957 | End: 2019-08-01
Payer: COMMERCIAL

## 2019-08-01 ENCOUNTER — APPOINTMENT (OUTPATIENT)
Dept: GENERAL RADIOLOGY | Age: 59
DRG: 957 | End: 2019-08-01
Payer: COMMERCIAL

## 2019-08-01 LAB
ABSOLUTE EOS #: 1.01 K/UL (ref 0–0.4)
ABSOLUTE IMMATURE GRANULOCYTE: 0 K/UL (ref 0–0.3)
ABSOLUTE LYMPH #: 1.27 K/UL (ref 1–4.8)
ABSOLUTE MONO #: 1.77 K/UL (ref 0.1–0.8)
ALLEN TEST: POSITIVE
ALLEN TEST: POSITIVE
AMMONIA: 46 UMOL/L (ref 16–60)
ANION GAP SERPL CALCULATED.3IONS-SCNC: 15 MMOL/L (ref 9–17)
BASOPHILS # BLD: 0 % (ref 0–2)
BASOPHILS ABSOLUTE: 0 K/UL (ref 0–0.2)
BUN BLDV-MCNC: 25 MG/DL (ref 6–20)
BUN/CREAT BLD: ABNORMAL (ref 9–20)
CALCIUM SERPL-MCNC: 9.2 MG/DL (ref 8.6–10.4)
CHLORIDE BLD-SCNC: 106 MMOL/L (ref 98–107)
CO2: 23 MMOL/L (ref 20–31)
CREAT SERPL-MCNC: 1.02 MG/DL (ref 0.7–1.2)
DIFFERENTIAL TYPE: ABNORMAL
EKG ATRIAL RATE: 142 BPM
EKG P AXIS: 77 DEGREES
EKG P-R INTERVAL: 148 MS
EKG Q-T INTERVAL: 268 MS
EKG QRS DURATION: 76 MS
EKG QTC CALCULATION (BAZETT): 412 MS
EKG R AXIS: -9 DEGREES
EKG T AXIS: 40 DEGREES
EKG VENTRICULAR RATE: 142 BPM
EOSINOPHILS RELATIVE PERCENT: 4 % (ref 1–4)
FIO2: 100
FIO2: ABNORMAL
GFR AFRICAN AMERICAN: >60 ML/MIN
GFR NON-AFRICAN AMERICAN: >60 ML/MIN
GFR SERPL CREATININE-BSD FRML MDRD: ABNORMAL ML/MIN/{1.73_M2}
GFR SERPL CREATININE-BSD FRML MDRD: ABNORMAL ML/MIN/{1.73_M2}
GLUCOSE BLD-MCNC: 101 MG/DL (ref 75–110)
GLUCOSE BLD-MCNC: 106 MG/DL (ref 75–110)
GLUCOSE BLD-MCNC: 112 MG/DL (ref 75–110)
GLUCOSE BLD-MCNC: 115 MG/DL (ref 70–99)
GLUCOSE BLD-MCNC: 116 MG/DL (ref 75–110)
GLUCOSE BLD-MCNC: 131 MG/DL (ref 75–110)
HCT VFR BLD CALC: 26.8 % (ref 40.7–50.3)
HEMOGLOBIN: 8.2 G/DL (ref 13–17)
IMMATURE GRANULOCYTES: 0 %
LYMPHOCYTES # BLD: 5 % (ref 24–44)
MCH RBC QN AUTO: 29.8 PG (ref 25.2–33.5)
MCHC RBC AUTO-ENTMCNC: 30.6 G/DL (ref 28.4–34.8)
MCV RBC AUTO: 97.5 FL (ref 82.6–102.9)
MODE: ABNORMAL
MODE: ABNORMAL
MONOCYTES # BLD: 7 % (ref 1–7)
MORPHOLOGY: NORMAL
NEGATIVE BASE EXCESS, ART: ABNORMAL (ref 0–2)
NEGATIVE BASE EXCESS, ART: ABNORMAL (ref 0–2)
NRBC AUTOMATED: 0 PER 100 WBC
O2 DEVICE/FLOW/%: ABNORMAL
O2 DEVICE/FLOW/%: ABNORMAL
PATIENT TEMP: ABNORMAL
PATIENT TEMP: ABNORMAL
PDW BLD-RTO: 13.7 % (ref 11.8–14.4)
PLATELET # BLD: 802 K/UL (ref 138–453)
PLATELET ESTIMATE: ABNORMAL
PMV BLD AUTO: 9 FL (ref 8.1–13.5)
POC HCO3: 25.1 MMOL/L (ref 21–28)
POC HCO3: 26 MMOL/L (ref 21–28)
POC O2 SATURATION: 100 % (ref 94–98)
POC O2 SATURATION: 87 % (ref 94–98)
POC PCO2 TEMP: ABNORMAL MM HG
POC PCO2 TEMP: ABNORMAL MM HG
POC PCO2: 33.4 MM HG (ref 35–48)
POC PCO2: 42.7 MM HG (ref 35–48)
POC PH TEMP: ABNORMAL
POC PH TEMP: ABNORMAL
POC PH: 7.39 (ref 7.35–7.45)
POC PH: 7.48 (ref 7.35–7.45)
POC PO2 TEMP: ABNORMAL MM HG
POC PO2 TEMP: ABNORMAL MM HG
POC PO2: 154.2 MM HG (ref 83–108)
POC PO2: 53.3 MM HG (ref 83–108)
POSITIVE BASE EXCESS, ART: 1 (ref 0–3)
POSITIVE BASE EXCESS, ART: 2 (ref 0–3)
POTASSIUM SERPL-SCNC: 4.4 MMOL/L (ref 3.7–5.3)
RBC # BLD: 2.75 M/UL (ref 4.21–5.77)
RBC # BLD: ABNORMAL 10*6/UL
SAMPLE SITE: ABNORMAL
SAMPLE SITE: ABNORMAL
SEG NEUTROPHILS: 84 % (ref 36–66)
SEGMENTED NEUTROPHILS ABSOLUTE COUNT: 21.25 K/UL (ref 1.8–7.7)
SODIUM BLD-SCNC: 144 MMOL/L (ref 135–144)
TCO2 (CALC), ART: 26 MMOL/L (ref 22–29)
TCO2 (CALC), ART: 27 MMOL/L (ref 22–29)
WBC # BLD: 25.3 K/UL (ref 3.5–11.3)
WBC # BLD: ABNORMAL 10*3/UL

## 2019-08-01 PROCEDURE — 74018 RADEX ABDOMEN 1 VIEW: CPT

## 2019-08-01 PROCEDURE — 6360000002 HC RX W HCPCS: Performed by: SURGERY

## 2019-08-01 PROCEDURE — 6360000002 HC RX W HCPCS: Performed by: STUDENT IN AN ORGANIZED HEALTH CARE EDUCATION/TRAINING PROGRAM

## 2019-08-01 PROCEDURE — 6370000000 HC RX 637 (ALT 250 FOR IP): Performed by: STUDENT IN AN ORGANIZED HEALTH CARE EDUCATION/TRAINING PROGRAM

## 2019-08-01 PROCEDURE — 85025 COMPLETE CBC W/AUTO DIFF WBC: CPT

## 2019-08-01 PROCEDURE — 2700000000 HC OXYGEN THERAPY PER DAY

## 2019-08-01 PROCEDURE — 36415 COLL VENOUS BLD VENIPUNCTURE: CPT

## 2019-08-01 PROCEDURE — 82803 BLOOD GASES ANY COMBINATION: CPT

## 2019-08-01 PROCEDURE — 82947 ASSAY GLUCOSE BLOOD QUANT: CPT

## 2019-08-01 PROCEDURE — 80048 BASIC METABOLIC PNL TOTAL CA: CPT

## 2019-08-01 PROCEDURE — 36600 WITHDRAWAL OF ARTERIAL BLOOD: CPT

## 2019-08-01 PROCEDURE — 70450 CT HEAD/BRAIN W/O DYE: CPT

## 2019-08-01 PROCEDURE — 2000000000 HC ICU R&B

## 2019-08-01 PROCEDURE — 94761 N-INVAS EAR/PLS OXIMETRY MLT: CPT

## 2019-08-01 PROCEDURE — 2580000003 HC RX 258: Performed by: STUDENT IN AN ORGANIZED HEALTH CARE EDUCATION/TRAINING PROGRAM

## 2019-08-01 PROCEDURE — 71045 X-RAY EXAM CHEST 1 VIEW: CPT

## 2019-08-01 PROCEDURE — 94640 AIRWAY INHALATION TREATMENT: CPT

## 2019-08-01 PROCEDURE — 6370000000 HC RX 637 (ALT 250 FOR IP): Performed by: SURGERY

## 2019-08-01 PROCEDURE — 82140 ASSAY OF AMMONIA: CPT

## 2019-08-01 PROCEDURE — 89220 SPUTUM SPECIMEN COLLECTION: CPT

## 2019-08-01 PROCEDURE — 31720 CLEARANCE OF AIRWAYS: CPT

## 2019-08-01 PROCEDURE — 92526 ORAL FUNCTION THERAPY: CPT

## 2019-08-01 RX ORDER — ACETYLCYSTEINE 200 MG/ML
400 SOLUTION ORAL; RESPIRATORY (INHALATION) 3 TIMES DAILY
Status: DISCONTINUED | OUTPATIENT
Start: 2019-08-01 | End: 2019-08-01

## 2019-08-01 RX ORDER — FENTANYL CITRATE 50 UG/ML
25 INJECTION, SOLUTION INTRAMUSCULAR; INTRAVENOUS
Status: DISCONTINUED | OUTPATIENT
Start: 2019-08-01 | End: 2019-08-01

## 2019-08-01 RX ORDER — ACETAMINOPHEN 650 MG/1
650 SUPPOSITORY RECTAL ONCE
Status: COMPLETED | OUTPATIENT
Start: 2019-08-01 | End: 2019-08-01

## 2019-08-01 RX ORDER — ALBUTEROL SULFATE 2.5 MG/3ML
2.5 SOLUTION RESPIRATORY (INHALATION) EVERY 4 HOURS PRN
Status: DISCONTINUED | OUTPATIENT
Start: 2019-08-01 | End: 2019-08-15 | Stop reason: HOSPADM

## 2019-08-01 RX ORDER — ALBUTEROL SULFATE 2.5 MG/3ML
2.5 SOLUTION RESPIRATORY (INHALATION) 3 TIMES DAILY
Status: DISCONTINUED | OUTPATIENT
Start: 2019-08-01 | End: 2019-08-15 | Stop reason: HOSPADM

## 2019-08-01 RX ORDER — GABAPENTIN 300 MG/1
300 CAPSULE ORAL 3 TIMES DAILY
Status: DISCONTINUED | OUTPATIENT
Start: 2019-08-01 | End: 2019-08-05

## 2019-08-01 RX ORDER — HALOPERIDOL 5 MG/ML
5 INJECTION INTRAMUSCULAR
Status: DISPENSED | OUTPATIENT
Start: 2019-08-01 | End: 2019-08-02

## 2019-08-01 RX ORDER — QUETIAPINE FUMARATE 100 MG/1
200 TABLET, FILM COATED ORAL 2 TIMES DAILY
Status: DISCONTINUED | OUTPATIENT
Start: 2019-08-01 | End: 2019-08-08

## 2019-08-01 RX ORDER — ACETYLCYSTEINE 200 MG/ML
400 SOLUTION ORAL; RESPIRATORY (INHALATION) 3 TIMES DAILY
Status: DISCONTINUED | OUTPATIENT
Start: 2019-08-01 | End: 2019-08-15 | Stop reason: HOSPADM

## 2019-08-01 RX ADMIN — QUETIAPINE FUMARATE 200 MG: 100 TABLET ORAL at 21:15

## 2019-08-01 RX ADMIN — PIPERACILLIN SODIUM AND TAZOBACTAM SODIUM 4.5 G: 4; .5 INJECTION, POWDER, LYOPHILIZED, FOR SOLUTION INTRAVENOUS at 09:45

## 2019-08-01 RX ADMIN — GABAPENTIN 300 MG: 300 CAPSULE ORAL at 15:30

## 2019-08-01 RX ADMIN — OXYCODONE HYDROCHLORIDE 5 MG: 5 TABLET ORAL at 15:30

## 2019-08-01 RX ADMIN — HALOPERIDOL LACTATE 5 MG: 5 INJECTION INTRAMUSCULAR at 21:16

## 2019-08-01 RX ADMIN — ENOXAPARIN SODIUM 30 MG: 100 INJECTION SUBCUTANEOUS at 09:47

## 2019-08-01 RX ADMIN — ACETAMINOPHEN 1000 MG: 500 TABLET ORAL at 21:15

## 2019-08-01 RX ADMIN — Medication 400 MG: at 09:27

## 2019-08-01 RX ADMIN — POTASSIUM CHLORIDE 20 MEQ: 1.5 POWDER, FOR SOLUTION ORAL at 21:33

## 2019-08-01 RX ADMIN — MAGNESIUM GLUCONATE 500 MG ORAL TABLET 400 MG: 500 TABLET ORAL at 21:15

## 2019-08-01 RX ADMIN — PIPERACILLIN SODIUM AND TAZOBACTAM SODIUM 4.5 G: 4; .5 INJECTION, POWDER, LYOPHILIZED, FOR SOLUTION INTRAVENOUS at 00:21

## 2019-08-01 RX ADMIN — Medication 2 MG: at 21:15

## 2019-08-01 RX ADMIN — MICONAZOLE NITRATE: 20 CREAM TOPICAL at 21:33

## 2019-08-01 RX ADMIN — CEFEPIME 2 G: 2 INJECTION, POWDER, FOR SOLUTION INTRAVENOUS at 21:13

## 2019-08-01 RX ADMIN — ENOXAPARIN SODIUM 30 MG: 100 INJECTION SUBCUTANEOUS at 00:21

## 2019-08-01 RX ADMIN — ACETAMINOPHEN 650 MG: 650 SUPPOSITORY RECTAL at 09:47

## 2019-08-01 RX ADMIN — ALBUTEROL SULFATE 2.5 MG: 2.5 SOLUTION RESPIRATORY (INHALATION) at 22:06

## 2019-08-01 RX ADMIN — FENTANYL CITRATE 25 MCG: 50 INJECTION, SOLUTION INTRAMUSCULAR; INTRAVENOUS at 17:42

## 2019-08-01 RX ADMIN — FENTANYL CITRATE 25 MCG: 50 INJECTION, SOLUTION INTRAMUSCULAR; INTRAVENOUS at 13:48

## 2019-08-01 RX ADMIN — Medication 400 MG: at 22:06

## 2019-08-01 RX ADMIN — VANCOMYCIN HYDROCHLORIDE 1000 MG: 1 INJECTION, SOLUTION INTRAVENOUS at 04:30

## 2019-08-01 RX ADMIN — Medication 10 ML: at 21:16

## 2019-08-01 RX ADMIN — SODIUM CHLORIDE, PRESERVATIVE FREE 10 ML: 5 INJECTION INTRAVENOUS at 21:33

## 2019-08-01 RX ADMIN — Medication 100 MG: at 15:30

## 2019-08-01 RX ADMIN — Medication 100 MG: at 21:15

## 2019-08-01 RX ADMIN — MICONAZOLE NITRATE: 20 CREAM TOPICAL at 15:00

## 2019-08-01 RX ADMIN — TRAZODONE HYDROCHLORIDE 100 MG: 100 TABLET ORAL at 21:16

## 2019-08-01 RX ADMIN — DOCUSATE SODIUM 100 MG: 50 LIQUID ORAL at 21:15

## 2019-08-01 RX ADMIN — ALBUTEROL SULFATE 2.5 MG: 2.5 SOLUTION RESPIRATORY (INHALATION) at 09:24

## 2019-08-01 RX ADMIN — Medication 400 MG: at 15:40

## 2019-08-01 RX ADMIN — ENOXAPARIN SODIUM 30 MG: 100 INJECTION SUBCUTANEOUS at 21:15

## 2019-08-01 RX ADMIN — ALBUTEROL SULFATE 2.5 MG: 2.5 SOLUTION RESPIRATORY (INHALATION) at 15:39

## 2019-08-01 RX ADMIN — OXYCODONE HYDROCHLORIDE 5 MG: 5 TABLET ORAL at 21:16

## 2019-08-01 RX ADMIN — VANCOMYCIN HYDROCHLORIDE 1000 MG: 1 INJECTION, SOLUTION INTRAVENOUS at 16:16

## 2019-08-01 RX ADMIN — GABAPENTIN 300 MG: 300 CAPSULE ORAL at 21:15

## 2019-08-01 ASSESSMENT — PAIN SCALES - GENERAL
PAINLEVEL_OUTOF10: 10
PAINLEVEL_OUTOF10: 10
PAINLEVEL_OUTOF10: 5
PAINLEVEL_OUTOF10: 10
PAINLEVEL_OUTOF10: 4
PAINLEVEL_OUTOF10: 5
PAINLEVEL_OUTOF10: 8
PAINLEVEL_OUTOF10: 10

## 2019-08-01 ASSESSMENT — PAIN DESCRIPTION - FREQUENCY: FREQUENCY: CONTINUOUS

## 2019-08-01 ASSESSMENT — PAIN DESCRIPTION - LOCATION: LOCATION: RIB CAGE;BACK

## 2019-08-01 ASSESSMENT — PAIN DESCRIPTION - ONSET: ONSET: GRADUAL

## 2019-08-01 ASSESSMENT — PAIN DESCRIPTION - PAIN TYPE: TYPE: ACUTE PAIN

## 2019-08-01 ASSESSMENT — PAIN DESCRIPTION - DESCRIPTORS: DESCRIPTORS: ACHING

## 2019-08-01 ASSESSMENT — PAIN DESCRIPTION - ORIENTATION: ORIENTATION: RIGHT

## 2019-08-01 ASSESSMENT — PAIN - FUNCTIONAL ASSESSMENT: PAIN_FUNCTIONAL_ASSESSMENT: PREVENTS OR INTERFERES SOME ACTIVE ACTIVITIES AND ADLS

## 2019-08-01 NOTE — PROGRESS NOTES
Assessment  Score 1 2 3   CXR and Breath Sounds   []  Clear []  No atelectasis  Basilar aeration []  Atelectasis or absent basilar breath sounds   Incentive Spirometry Volume  (Per IBW)   []  Greater than or equal to 15ml/Kg []  less than 15ml/Kg []  less than 15ml/Kg   Surgery within last 2 weeks []  None or general   []  Abdominal or thoracic surgery  []  Abdominal or thoracic   Chronic Pulmonary Historyre []  No []  Yes []  Yes     [x]  Secretion Management Assessment  Score 1 2 3   Bilateral Breath Sounds   []  Occasional Rhonchi [x]  Scattered Rhonchi []  Course Rhonchi and/or poor aeration   Sputum    []  Small amount of thin secretions [x]  Moderate amount of viscous secretions []  Copius, Viscious Yellow/ Secretions   CXR as reported by physician []  clear  []  Unavailable [x]  Infiltrates and/or consolidation  []  Unavailable []  Mucus Plugging and or lobar consolidation  []  Unavailable   Cough []  Strong, productive cough [x]  Weak productive cough []  No cough or weak non-productive cough   Bijal Luis  2:38 AM                            FEMALE                                  MALE                            FEV1 Predicted Normal Values                        FEV1 Predicted Normal Values          Age                                     Height in Feet and Inches       Age                                     Height in Feet and Inches       4' 11\" 5' 1\" 5' 3\" 5' 5\" 5' 7\" 5' 9\" 5' 11\" 6' 1\"  4' 11\" 5' 1\" 5' 3\" 5' 5\" 5' 7\" 5' 9\" 5' 11\" 6' 1\"   42 - 45 2.49 2.66 2.84 3.03 3.22 3.42 3.62 3.83 42 - 45 2.82 3.03 3.26 3.49 3.72 3.96 4.22 4.47   46 - 49 2.40 2.57 2.76 2.94 3.14 3.33 3.54 3.75 46 - 49 2.70 2.92 3.14 3.37 3.61 3.85 4.10 4.36   50 - 53 2.31 2.48 2.66 2.85 3.04 3.24 3.45 3.66 50 - 53 2.58 2.80 3.02 3.25 3.49 3.73 3.98 4.24   54 - 57 2.21 2.38 2.57 2.75 2.95 3.14 3.35 3.56 54 - 57 2.46 2.67 2.89 3.12 3.36 3.60 3.85 4.11   58 - 61 2.10 2.28 2.46 2.65 2.84 3.04 3.24 3.45 58 - 61 2.32 2.54 2.76 2.99

## 2019-08-01 NOTE — PROGRESS NOTES
Nutrition Assessment (Enteral Nutrition)    Type and Reason for Visit: Reassess    Nutrition Recommendations:   - As able, restart TF of Pivot 1.5 (immune enhancing) slowly advancing to goal rate of 55 mL/hr. Monitor TF tolerance/adequacy of intakes - modify as needed. - If alterative TF formula requested, suggest Vital AF 1.2 (semi-elemental) goal rate 65 mL/hr.  - Continue NPO - monitor for repeat swallow study as appropriate. Nutrition Assessment: Pt remains nutritionally compromised aeb failed MBSS on 7/31. Pt's NG dislodged/pulled overnight and has been replaced. RN reports TF to be restarted. Noted pt has been having some difficulty with residuals and vomiting. Malnutrition Assessment:  · Malnutrition Status: No malnutrition  · Context: Acute illness or injury  · Findings of the 6 clinical characteristics of malnutrition (Minimum of 2 out of 6 clinical characteristics is required to make the diagnosis of moderate or severe Protein Calorie Malnutrition based on AND/ASPEN Guidelines):  1. Energy Intake-Greater than 75% of estimated energy requirement, Greater than or equal to 7 days(with TF running at goal rate)    2. Weight Loss-7.5% loss or greater, in 1 month  3. Fat Loss-No significant subcutaneous fat loss  4. Muscle Loss-No significant muscle mass loss  5. Fluid Accumulation-No significant fluid accumulation, Extremities, Generalized    Nutrition Risk Level:  Moderate    Nutrition Needs:  · Estimated Daily Total Kcal: 8927-0653 kcal/day  · Estimated Daily Protein (g): 105-125 gm/day    Nutrition Diagnosis:   · Problem: Inadequate oral intake  · Etiology: related to Difficulty swallowing     Signs and symptoms:  as evidenced by NPO status due to medical condition, Swallow study results, Nutrition support - EN    Objective Information:  · Nutrition-Focused Physical Findings: +BM  · Wound Type: Skin Tears(Excoriation of right hip/buttock noted.)  · Current Nutrition Therapies:  · Oral

## 2019-08-01 NOTE — PROGRESS NOTES
Speech Language Pathology  Speech Language Pathology  [address] [facility]    Dysphagia Treatment Note    Date: 8/1/2019  Patients Name: Zahraa Arroyo  MRN: [id number]  Diagnosis:  Patient Active Problem List   Diagnosis Code    Fall W19. Karon Watton    Closed fracture of multiple ribs of right side S22.41XA    Contusion of right lung S27.321A    Fracture of transverse process of thoracic vertebra (HCC) S22.009A    Hemothorax on right J94.2    Pneumothorax J93.9    Closed fracture of right iliac wing (HCC) S32.301A    Hypokalemia E87.6    Fx dorsal vertebra-closed ([address] S22.009A    Traumatic closed fracture of distal clavicle with minimal displacement, left, initial encounter S42.032A       Pain: 0    Dysphagia Treatment  Treatment time: 10:46-11:04    Subjective: [x] Alert [x] Cooperative     [x] Confused     [] Agitated    [] Lethargic    Objective/Assessment:     Pt. Seen for O/M treatment program for dysphagia. Pt. Completed O/M exercises X15 X2 sets with minimal visual cues. Pt. Unable to complete daphne maneuver. Pt required moderate verbal cues to complete lingual resistance exercises. Plan:  [x] Continue  services    [] Discharge from :      Discharge recommendations: [] Inpatient Rehab   [] [place]   [] Outpatient Therapy  [] Follow up at trauma clinic   [x] Other: Further therapy recommended at discharge.      Completed by Salvador Condon,  Clinician    Co-signed by Mariam Lefort M.S. CCC/SLP

## 2019-08-01 NOTE — PROGRESS NOTES
PROGRESS NOTE          PATIENT NAME: Buck Rice  MEDICAL RECORD NO. [id number]  DATE: 2019  SURGEON: Leticia Prieto   PRIMARY CARE PHYSICIAN: Shamir Murillo MD    HD: # [id number]    ASSESSMENT    Patient Active Problem List   Diagnosis    Fall    Closed fracture of multiple ribs of right side    Contusion of right lung    Fracture of transverse process of thoracic vertebra ([address]    Hemothorax on right    Pneumothorax    Closed fracture of right iliac wing (HCC)    Hypokalemia    Fx dorsal vertebra-closed ([address]    Traumatic closed fracture of distal clavicle with minimal displacement, left, initial encounter       [facility]    1. Neuro  1. Pain control: cherie 900mg Q8H, tylenol mayra 10mg Q4H,  2. Fent PRN  3. Seroquel 100mg BID     2. CV  1. Current bp 145/76    3. Pulm  1. R 1st-12th rib fractures, R small hemo/pneumo, pulm contusion  2. Extubated   3. Patent desaturated to 71 last night, rapid response called. Patient was suctioned and placed on high flow nasal cannula, satting 100% while in the room.       4. Renal  1. Follow up BMP  5.   GI  Patient removed NG tube yesterday night, will be replaced this morning   6.   ID:  1. Follow up CBC today   1. Final chest tube was removed yesterday, . Chest x-ray 4 hours post read no pneumothorax      7.   MSK        - R comminuted iliac wing fx and left distal clavicular fx                    -Partial WBAT RLE      SUBJECTIVE    Buck Rice is doing better since I saw him yesterday. Patient was calmly answering questions and seemed more lucid. Patient denies headache, fever, chills, shortness of breath, chest pain, abdominal pain, change in bowel or bladder function. Patient is breathing comfortably on high flow.        OBJECTIVE  VITALS: Temp: Temp: 100.7 °F (38.2 °C)Temp  Av.1 °F (37.3 °C)  Min: 98.3 °F (36.8 °C)  Max: 100.7 °F (97.4 °C) BP Systolic (85VQJ), HJF:223 , Min:93 , VMZ:391   Diastolic (82RRL), XFB:48, Min:53,

## 2019-08-01 NOTE — PROGRESS NOTES
This note also relates to the following rows which could not be included:  Resp - Cannot attach notes to unvalidated device data  SpO2 - Cannot attach notes to unvalidated device data

## 2019-08-02 LAB
ABSOLUTE EOS #: 0.2 K/UL (ref 0–0.4)
ABSOLUTE EOS #: 0.62 K/UL (ref 0–0.4)
ABSOLUTE IMMATURE GRANULOCYTE: 0 K/UL (ref 0–0.3)
ABSOLUTE IMMATURE GRANULOCYTE: 0 K/UL (ref 0–0.3)
ABSOLUTE LYMPH #: 1.03 K/UL (ref 1–4.8)
ABSOLUTE LYMPH #: 2.65 K/UL (ref 1–4.8)
ABSOLUTE MONO #: 1.03 K/UL (ref 0.1–0.8)
ABSOLUTE MONO #: 2.24 K/UL (ref 0.1–0.8)
ANION GAP SERPL CALCULATED.3IONS-SCNC: 15 MMOL/L (ref 9–17)
ANION GAP SERPL CALCULATED.3IONS-SCNC: 18 MMOL/L (ref 9–17)
BASOPHILS # BLD: 1 % (ref 0–2)
BASOPHILS # BLD: 1 % (ref 0–2)
BASOPHILS ABSOLUTE: 0.2 K/UL (ref 0–0.2)
BASOPHILS ABSOLUTE: 0.21 K/UL (ref 0–0.2)
BUN BLDV-MCNC: 24 MG/DL (ref 6–20)
BUN BLDV-MCNC: 27 MG/DL (ref 6–20)
BUN/CREAT BLD: ABNORMAL (ref 9–20)
BUN/CREAT BLD: ABNORMAL (ref 9–20)
CALCIUM SERPL-MCNC: 8.9 MG/DL (ref 8.6–10.4)
CALCIUM SERPL-MCNC: 9.1 MG/DL (ref 8.6–10.4)
CHLORIDE BLD-SCNC: 106 MMOL/L (ref 98–107)
CHLORIDE BLD-SCNC: 109 MMOL/L (ref 98–107)
CO2: 21 MMOL/L (ref 20–31)
CO2: 22 MMOL/L (ref 20–31)
CREAT SERPL-MCNC: 0.99 MG/DL (ref 0.7–1.2)
CREAT SERPL-MCNC: 1 MG/DL (ref 0.7–1.2)
DIFFERENTIAL TYPE: ABNORMAL
DIFFERENTIAL TYPE: ABNORMAL
EOSINOPHILS RELATIVE PERCENT: 1 % (ref 1–4)
EOSINOPHILS RELATIVE PERCENT: 3 % (ref 1–4)
GFR AFRICAN AMERICAN: >60 ML/MIN
GFR AFRICAN AMERICAN: >60 ML/MIN
GFR NON-AFRICAN AMERICAN: >60 ML/MIN
GFR NON-AFRICAN AMERICAN: >60 ML/MIN
GFR SERPL CREATININE-BSD FRML MDRD: ABNORMAL ML/MIN/{1.73_M2}
GLUCOSE BLD-MCNC: 113 MG/DL (ref 75–110)
GLUCOSE BLD-MCNC: 115 MG/DL (ref 70–99)
GLUCOSE BLD-MCNC: 120 MG/DL (ref 75–110)
GLUCOSE BLD-MCNC: 124 MG/DL (ref 75–110)
GLUCOSE BLD-MCNC: 125 MG/DL (ref 75–110)
GLUCOSE BLD-MCNC: 137 MG/DL (ref 70–99)
HCT VFR BLD CALC: 24.7 % (ref 40.7–50.3)
HCT VFR BLD CALC: 25.3 % (ref 40.7–50.3)
HEMOGLOBIN: 7.5 G/DL (ref 13–17)
HEMOGLOBIN: 7.8 G/DL (ref 13–17)
IMMATURE GRANULOCYTES: 0 %
IMMATURE GRANULOCYTES: 0 %
LYMPHOCYTES # BLD: 13 % (ref 24–44)
LYMPHOCYTES # BLD: 5 % (ref 24–44)
MAGNESIUM: 2.3 MG/DL (ref 1.6–2.6)
MCH RBC QN AUTO: 29.9 PG (ref 25.2–33.5)
MCH RBC QN AUTO: 30.1 PG (ref 25.2–33.5)
MCHC RBC AUTO-ENTMCNC: 30.4 G/DL (ref 28.4–34.8)
MCHC RBC AUTO-ENTMCNC: 30.8 G/DL (ref 28.4–34.8)
MCV RBC AUTO: 96.9 FL (ref 82.6–102.9)
MCV RBC AUTO: 99.2 FL (ref 82.6–102.9)
MONOCYTES # BLD: 11 % (ref 1–7)
MONOCYTES # BLD: 5 % (ref 1–7)
MORPHOLOGY: NORMAL
MORPHOLOGY: NORMAL
NRBC AUTOMATED: 0 PER 100 WBC
NRBC AUTOMATED: 0 PER 100 WBC
PDW BLD-RTO: 13.4 % (ref 11.8–14.4)
PDW BLD-RTO: 13.6 % (ref 11.8–14.4)
PLATELET # BLD: 729 K/UL (ref 138–453)
PLATELET # BLD: 783 K/UL (ref 138–453)
PLATELET ESTIMATE: ABNORMAL
PLATELET ESTIMATE: ABNORMAL
PMV BLD AUTO: 9.1 FL (ref 8.1–13.5)
PMV BLD AUTO: 9.3 FL (ref 8.1–13.5)
POTASSIUM SERPL-SCNC: 3.6 MMOL/L (ref 3.7–5.3)
POTASSIUM SERPL-SCNC: 4.1 MMOL/L (ref 3.7–5.3)
RBC # BLD: 2.49 M/UL (ref 4.21–5.77)
RBC # BLD: 2.61 M/UL (ref 4.21–5.77)
RBC # BLD: ABNORMAL 10*6/UL
RBC # BLD: ABNORMAL 10*6/UL
SEG NEUTROPHILS: 74 % (ref 36–66)
SEG NEUTROPHILS: 86 % (ref 36–66)
SEGMENTED NEUTROPHILS ABSOLUTE COUNT: 15.11 K/UL (ref 1.8–7.7)
SEGMENTED NEUTROPHILS ABSOLUTE COUNT: 17.61 K/UL (ref 1.8–7.7)
SODIUM BLD-SCNC: 142 MMOL/L (ref 135–144)
SODIUM BLD-SCNC: 149 MMOL/L (ref 135–144)
VANCOMYCIN TROUGH DATE LAST DOSE: NORMAL
VANCOMYCIN TROUGH DOSE AMOUNT: NORMAL
VANCOMYCIN TROUGH TIME LAST DOSE: NORMAL
VANCOMYCIN TROUGH: 17.5 UG/ML (ref 10–20)
WBC # BLD: 20.4 K/UL (ref 3.5–11.3)
WBC # BLD: 20.5 K/UL (ref 3.5–11.3)
WBC # BLD: ABNORMAL 10*3/UL
WBC # BLD: ABNORMAL 10*3/UL

## 2019-08-02 PROCEDURE — 97530 THERAPEUTIC ACTIVITIES: CPT

## 2019-08-02 PROCEDURE — 80048 BASIC METABOLIC PNL TOTAL CA: CPT

## 2019-08-02 PROCEDURE — 82947 ASSAY GLUCOSE BLOOD QUANT: CPT

## 2019-08-02 PROCEDURE — 97110 THERAPEUTIC EXERCISES: CPT

## 2019-08-02 PROCEDURE — 6370000000 HC RX 637 (ALT 250 FOR IP): Performed by: SURGERY

## 2019-08-02 PROCEDURE — 36415 COLL VENOUS BLD VENIPUNCTURE: CPT

## 2019-08-02 PROCEDURE — 6370000000 HC RX 637 (ALT 250 FOR IP): Performed by: STUDENT IN AN ORGANIZED HEALTH CARE EDUCATION/TRAINING PROGRAM

## 2019-08-02 PROCEDURE — 6360000002 HC RX W HCPCS: Performed by: STUDENT IN AN ORGANIZED HEALTH CARE EDUCATION/TRAINING PROGRAM

## 2019-08-02 PROCEDURE — 94640 AIRWAY INHALATION TREATMENT: CPT

## 2019-08-02 PROCEDURE — 2580000003 HC RX 258: Performed by: STUDENT IN AN ORGANIZED HEALTH CARE EDUCATION/TRAINING PROGRAM

## 2019-08-02 PROCEDURE — 93005 ELECTROCARDIOGRAM TRACING: CPT | Performed by: STUDENT IN AN ORGANIZED HEALTH CARE EDUCATION/TRAINING PROGRAM

## 2019-08-02 PROCEDURE — 83735 ASSAY OF MAGNESIUM: CPT

## 2019-08-02 PROCEDURE — 94669 MECHANICAL CHEST WALL OSCILL: CPT

## 2019-08-02 PROCEDURE — 80202 ASSAY OF VANCOMYCIN: CPT

## 2019-08-02 PROCEDURE — 97116 GAIT TRAINING THERAPY: CPT

## 2019-08-02 PROCEDURE — 85025 COMPLETE CBC W/AUTO DIFF WBC: CPT

## 2019-08-02 PROCEDURE — 6360000002 HC RX W HCPCS: Performed by: SURGERY

## 2019-08-02 PROCEDURE — 31720 CLEARANCE OF AIRWAYS: CPT

## 2019-08-02 PROCEDURE — 2000000000 HC ICU R&B

## 2019-08-02 PROCEDURE — 2700000000 HC OXYGEN THERAPY PER DAY

## 2019-08-02 PROCEDURE — 94762 N-INVAS EAR/PLS OXIMTRY CONT: CPT

## 2019-08-02 RX ORDER — IBUPROFEN 400 MG/1
400 TABLET ORAL EVERY 6 HOURS PRN
Status: DISCONTINUED | OUTPATIENT
Start: 2019-08-02 | End: 2019-08-03

## 2019-08-02 RX ORDER — HALOPERIDOL 5 MG/ML
5 INJECTION INTRAMUSCULAR EVERY 4 HOURS PRN
Status: COMPLETED | OUTPATIENT
Start: 2019-08-02 | End: 2019-08-03

## 2019-08-02 RX ADMIN — HALOPERIDOL LACTATE 5 MG: 5 INJECTION INTRAMUSCULAR at 03:50

## 2019-08-02 RX ADMIN — ALBUTEROL SULFATE 2.5 MG: 2.5 SOLUTION RESPIRATORY (INHALATION) at 14:06

## 2019-08-02 RX ADMIN — MICONAZOLE NITRATE: 20 CREAM TOPICAL at 21:14

## 2019-08-02 RX ADMIN — Medication 100 MG: at 20:34

## 2019-08-02 RX ADMIN — CLONIDINE HYDROCHLORIDE 0.3 MG: 0.2 TABLET ORAL at 16:16

## 2019-08-02 RX ADMIN — FOLIC ACID 1 MG: 1 TABLET ORAL at 08:44

## 2019-08-02 RX ADMIN — THERA TABS 1 TABLET: TAB at 08:48

## 2019-08-02 RX ADMIN — ACETAMINOPHEN 1000 MG: 500 TABLET ORAL at 04:15

## 2019-08-02 RX ADMIN — Medication 400 MG: at 20:27

## 2019-08-02 RX ADMIN — Medication 400 MG: at 14:06

## 2019-08-02 RX ADMIN — Medication 10 ML: at 09:16

## 2019-08-02 RX ADMIN — CEFEPIME 2 G: 2 INJECTION, POWDER, FOR SOLUTION INTRAVENOUS at 15:52

## 2019-08-02 RX ADMIN — OXYCODONE HYDROCHLORIDE 5 MG: 5 TABLET ORAL at 04:15

## 2019-08-02 RX ADMIN — CEFEPIME 2 G: 2 INJECTION, POWDER, FOR SOLUTION INTRAVENOUS at 09:09

## 2019-08-02 RX ADMIN — GABAPENTIN 300 MG: 300 CAPSULE ORAL at 08:46

## 2019-08-02 RX ADMIN — Medication 100 MG: at 12:57

## 2019-08-02 RX ADMIN — ENOXAPARIN SODIUM 30 MG: 100 INJECTION SUBCUTANEOUS at 20:31

## 2019-08-02 RX ADMIN — SODIUM CHLORIDE, PRESERVATIVE FREE 10 ML: 5 INJECTION INTRAVENOUS at 20:35

## 2019-08-02 RX ADMIN — VANCOMYCIN HYDROCHLORIDE 1000 MG: 1 INJECTION, SOLUTION INTRAVENOUS at 03:50

## 2019-08-02 RX ADMIN — ACETAMINOPHEN 1000 MG: 500 TABLET ORAL at 20:30

## 2019-08-02 RX ADMIN — DOCUSATE SODIUM 100 MG: 50 LIQUID ORAL at 20:32

## 2019-08-02 RX ADMIN — QUETIAPINE FUMARATE 200 MG: 100 TABLET ORAL at 08:49

## 2019-08-02 RX ADMIN — Medication 400 MG: at 09:12

## 2019-08-02 RX ADMIN — ENOXAPARIN SODIUM 30 MG: 100 INJECTION SUBCUTANEOUS at 08:45

## 2019-08-02 RX ADMIN — GABAPENTIN 300 MG: 300 CAPSULE ORAL at 20:32

## 2019-08-02 RX ADMIN — GABAPENTIN 300 MG: 300 CAPSULE ORAL at 12:57

## 2019-08-02 RX ADMIN — VANCOMYCIN HYDROCHLORIDE 1000 MG: 1 INJECTION, SOLUTION INTRAVENOUS at 17:23

## 2019-08-02 RX ADMIN — SODIUM CHLORIDE, PRESERVATIVE FREE 10 ML: 5 INJECTION INTRAVENOUS at 09:13

## 2019-08-02 RX ADMIN — ALBUTEROL SULFATE 2.5 MG: 2.5 SOLUTION RESPIRATORY (INHALATION) at 20:28

## 2019-08-02 RX ADMIN — POTASSIUM CHLORIDE 20 MEQ: 1.5 POWDER, FOR SOLUTION ORAL at 08:49

## 2019-08-02 RX ADMIN — Medication 2 MG: at 20:31

## 2019-08-02 RX ADMIN — MAGNESIUM GLUCONATE 500 MG ORAL TABLET 400 MG: 500 TABLET ORAL at 08:52

## 2019-08-02 RX ADMIN — SODIUM CHLORIDE, POTASSIUM CHLORIDE, SODIUM LACTATE AND CALCIUM CHLORIDE: 600; 310; 30; 20 INJECTION, SOLUTION INTRAVENOUS at 10:52

## 2019-08-02 RX ADMIN — MAGNESIUM GLUCONATE 500 MG ORAL TABLET 400 MG: 500 TABLET ORAL at 20:32

## 2019-08-02 RX ADMIN — HALOPERIDOL LACTATE 5 MG: 5 INJECTION INTRAMUSCULAR at 21:54

## 2019-08-02 RX ADMIN — QUETIAPINE FUMARATE 200 MG: 100 TABLET ORAL at 20:34

## 2019-08-02 RX ADMIN — HALOPERIDOL LACTATE 5 MG: 5 INJECTION INTRAMUSCULAR at 01:35

## 2019-08-02 RX ADMIN — ALBUTEROL SULFATE 2.5 MG: 2.5 SOLUTION RESPIRATORY (INHALATION) at 09:11

## 2019-08-02 RX ADMIN — Medication 100 MG: at 08:44

## 2019-08-02 RX ADMIN — POTASSIUM CHLORIDE 20 MEQ: 1.5 POWDER, FOR SOLUTION ORAL at 20:31

## 2019-08-02 RX ADMIN — MICONAZOLE NITRATE: 20 CREAM TOPICAL at 08:50

## 2019-08-02 RX ADMIN — ACETAMINOPHEN 1000 MG: 500 TABLET ORAL at 10:45

## 2019-08-02 RX ADMIN — TRAZODONE HYDROCHLORIDE 100 MG: 100 TABLET ORAL at 20:31

## 2019-08-02 RX ADMIN — CEFEPIME 2 G: 2 INJECTION, POWDER, FOR SOLUTION INTRAVENOUS at 04:59

## 2019-08-02 RX ADMIN — CLONIDINE HYDROCHLORIDE 0.1 MG: 0.1 TABLET ORAL at 08:43

## 2019-08-02 ASSESSMENT — PAIN SCALES - GENERAL
PAINLEVEL_OUTOF10: 0
PAINLEVEL_OUTOF10: 0
PAINLEVEL_OUTOF10: 8
PAINLEVEL_OUTOF10: 0

## 2019-08-02 NOTE — PROGRESS NOTES
Pharmacy Note  Vancomycin Consult    Ignacia Cuba is a 63 y.o. male started on Vancomycin for pneumonia; consult received from Dr. Albert Cerrato to manage therapy. Also receiving the following antibiotics: Cefepime. Patient Active Problem List   Diagnosis    Fall    Closed fracture of multiple ribs of right side    Contusion of right lung    Fracture of transverse process of thoracic vertebra (HCC)    Hemothorax on right    Pneumothorax    Closed fracture of right iliac wing (HCC)    Hypokalemia    Fx dorsal vertebra-closed (HCC)    Traumatic closed fracture of distal clavicle with minimal displacement, left, initial encounter       Allergies:  Patient has no known allergies. Temp max: 39.5 C    Recent Labs     08/01/19  0905 08/02/19  0517   BUN 25* 24*       Recent Labs     08/01/19  0905 08/02/19  0517   CREATININE 1.02 1.00       Recent Labs     08/01/19  0905 08/02/19  0517   WBC 25.3* 20.4*         Intake/Output Summary (Last 24 hours) at 8/2/2019 1148  Last data filed at 8/2/2019 1049  Gross per 24 hour   Intake 1838.11 ml   Output 1950 ml   Net -111.89 ml       Culture Date      Source                       Results  07.20                   Blood x2                     No growth  07.25                   MRSA Swab               Negative  07.29                   Blood x2                     Pending    Ht Readings from Last 1 Encounters:   07/15/19 6' 1\" (1.854 m)        Wt Readings from Last 1 Encounters:   08/02/19 167 lb 12.3 oz (76.1 kg)         Body mass index is 22.13 kg/m². Estimated Creatinine Clearance: 87 mL/min (based on SCr of 1 mg/dL). Goal Trough Level: 15-20 mcg/mL    Assessment/Plan:  Patient currently on 1000mg IV Q12H, will check a trough prior to dose this evening and adjust if necessary. Thank you for the consult. Will continue to follow.   Daniel Miles PharmD BCPS  8/2/2019 11:48 AM

## 2019-08-02 NOTE — PLAN OF CARE
OXYGENATION/RESPIRATORY FUNCTION  Goal: Patient will maintain patent airway  8/2/2019 0226 by Stephan Eisenmenger, RN  Outcome: Ongoing  8/1/2019 1851 by Marissa Malik RN  Outcome: Ongoing  Goal: Patient will achieve/maintain normal respiratory rate/effort  Description  Respiratory rate and effort will be within normal limits for the patient   8/2/2019 0226 by Stephan Eisenmenger, RN  Outcome: Ongoing  8/1/2019 1851 by Marissa Malik RN  Outcome: Ongoing     Problem: SKIN INTEGRITY  Goal: Skin integrity is maintained or improved  8/2/2019 0226 by Stephan Eisenmenger, RN  Outcome: Ongoing  8/1/2019 1851 by Marissa Malik RN  Outcome: Ongoing     Problem: Nutrition  Goal: Optimal nutrition therapy  8/2/2019 0226 by Stephan Eisenmenger, RN  Outcome: Ongoing  8/1/2019 1851 by Marissa Malik RN  Outcome: Not Met This Shift     Electronically signed by Stephan Eisenmenger, RN on 8/2/2019 at 2:26 AM

## 2019-08-02 NOTE — PLAN OF CARE
Problem: Pain:  Goal: Pain level will decrease  Description  Pain level will decrease   8/2/2019 0929 by Sudeep Rand RN  Outcome: Ongoing  8/2/2019 0226 by Lissa Quijano RN  Outcome: Ongoing  Goal: Control of acute pain  Description  Control of acute pain   8/2/2019 0929 by Sudeep Rand RN  Outcome: Ongoing  8/2/2019 0226 by Lissa Quijano RN  Outcome: Ongoing  Goal: Control of chronic pain  Description  Control of chronic pain   8/2/2019 0929 by Sudeep Rand RN  Outcome: Ongoing  8/2/2019 0226 by Lissa Quijano RN  Outcome: Ongoing     Problem: Falls - Risk of:  Goal: Will remain free from falls  Description  Will remain free from falls   8/2/2019 0929 by Sudeep Rand RN  Outcome: Ongoing  8/2/2019 0226 by Lissa Quijano RN  Outcome: Ongoing  Goal: Absence of physical injury  Description  Absence of physical injury   8/2/2019 0929 by Sudeep Rand RN  Outcome: Ongoing  8/2/2019 0226 by Lissa Quijano RN  Outcome: Ongoing     Problem: Nutrition  Goal: Optimal nutrition therapy  8/2/2019 0929 by Sudeep Rand RN  Outcome: Ongoing  8/2/2019 0226 by Lissa Quijano RN  Outcome: Ongoing     Problem: Injury - Risk of, Physical Injury:  Goal: Will remain free from falls  Description  Will remain free from falls   8/2/2019 0929 by Sudeep Rand RN  Outcome: Ongoing  8/2/2019 0226 by Lissa Quijano RN  Outcome: Ongoing  Goal: Absence of physical injury  Description  Absence of physical injury   8/2/2019 0929 by Sudeep Rand RN  Outcome: Ongoing  8/2/2019 0226 by Lissa Quijano RN  Outcome: Ongoing     Problem: Injury - Risk of, Physical Injury:  Goal: Will remain free from falls  Description  Will remain free from falls   8/2/2019 0929 by Sudeep Rand RN  Outcome: Ongoing  8/2/2019 0226 by Lissa Quijano RN  Outcome: Ongoing  Goal: Absence of physical injury  Description  Absence of physical injury   8/2/2019 0929 by Sudeep Rand RN  Outcome: Ongoing  8/2/2019 0226 by

## 2019-08-03 ENCOUNTER — APPOINTMENT (OUTPATIENT)
Dept: GENERAL RADIOLOGY | Age: 59
DRG: 957 | End: 2019-08-03
Payer: COMMERCIAL

## 2019-08-03 LAB
ABSOLUTE EOS #: 0.71 K/UL (ref 0–0.44)
ABSOLUTE IMMATURE GRANULOCYTE: 0.18 K/UL (ref 0–0.3)
ABSOLUTE LYMPH #: 1.77 K/UL (ref 1.1–3.7)
ABSOLUTE MONO #: 1.95 K/UL (ref 0.1–1.2)
ANION GAP SERPL CALCULATED.3IONS-SCNC: 13 MMOL/L (ref 9–17)
ANION GAP SERPL CALCULATED.3IONS-SCNC: 18 MMOL/L (ref 9–17)
BASOPHILS # BLD: 1 % (ref 0–2)
BASOPHILS ABSOLUTE: 0.18 K/UL (ref 0–0.2)
BNP INTERPRETATION: NORMAL
BUN BLDV-MCNC: 27 MG/DL (ref 6–20)
BUN BLDV-MCNC: 28 MG/DL (ref 6–20)
BUN/CREAT BLD: ABNORMAL (ref 9–20)
BUN/CREAT BLD: ABNORMAL (ref 9–20)
CALCIUM SERPL-MCNC: 8.9 MG/DL (ref 8.6–10.4)
CALCIUM SERPL-MCNC: 9.7 MG/DL (ref 8.6–10.4)
CHLORIDE BLD-SCNC: 105 MMOL/L (ref 98–107)
CHLORIDE BLD-SCNC: 107 MMOL/L (ref 98–107)
CO2: 21 MMOL/L (ref 20–31)
CO2: 23 MMOL/L (ref 20–31)
CREAT SERPL-MCNC: 0.94 MG/DL (ref 0.7–1.2)
CREAT SERPL-MCNC: 1.1 MG/DL (ref 0.7–1.2)
DIFFERENTIAL TYPE: ABNORMAL
EOSINOPHILS RELATIVE PERCENT: 4 % (ref 1–4)
GFR AFRICAN AMERICAN: >60 ML/MIN
GFR AFRICAN AMERICAN: >60 ML/MIN
GFR NON-AFRICAN AMERICAN: >60 ML/MIN
GFR NON-AFRICAN AMERICAN: >60 ML/MIN
GFR SERPL CREATININE-BSD FRML MDRD: ABNORMAL ML/MIN/{1.73_M2}
GLUCOSE BLD-MCNC: 122 MG/DL (ref 70–99)
GLUCOSE BLD-MCNC: 133 MG/DL (ref 70–99)
HCT VFR BLD CALC: 24.9 % (ref 40.7–50.3)
HEMOGLOBIN: 7.2 G/DL (ref 13–17)
IMMATURE GRANULOCYTES: 1 %
LYMPHOCYTES # BLD: 10 % (ref 24–43)
MCH RBC QN AUTO: 28.8 PG (ref 25.2–33.5)
MCHC RBC AUTO-ENTMCNC: 28.9 G/DL (ref 28.4–34.8)
MCV RBC AUTO: 99.6 FL (ref 82.6–102.9)
MONOCYTES # BLD: 11 % (ref 3–12)
MORPHOLOGY: NORMAL
NRBC AUTOMATED: 0 PER 100 WBC
PDW BLD-RTO: 13.7 % (ref 11.8–14.4)
PLATELET # BLD: 706 K/UL (ref 138–453)
PLATELET ESTIMATE: ABNORMAL
PMV BLD AUTO: 9.3 FL (ref 8.1–13.5)
POTASSIUM SERPL-SCNC: 3.9 MMOL/L (ref 3.7–5.3)
POTASSIUM SERPL-SCNC: 3.9 MMOL/L (ref 3.7–5.3)
PRO-BNP: 284 PG/ML
RBC # BLD: 2.5 M/UL (ref 4.21–5.77)
RBC # BLD: ABNORMAL 10*6/UL
SEG NEUTROPHILS: 73 % (ref 36–65)
SEGMENTED NEUTROPHILS ABSOLUTE COUNT: 12.91 K/UL (ref 1.5–8.1)
SODIUM BLD-SCNC: 141 MMOL/L (ref 135–144)
SODIUM BLD-SCNC: 146 MMOL/L (ref 135–144)
WBC # BLD: 17.7 K/UL (ref 3.5–11.3)
WBC # BLD: ABNORMAL 10*3/UL

## 2019-08-03 PROCEDURE — 6370000000 HC RX 637 (ALT 250 FOR IP): Performed by: STUDENT IN AN ORGANIZED HEALTH CARE EDUCATION/TRAINING PROGRAM

## 2019-08-03 PROCEDURE — 71045 X-RAY EXAM CHEST 1 VIEW: CPT

## 2019-08-03 PROCEDURE — 2580000003 HC RX 258: Performed by: STUDENT IN AN ORGANIZED HEALTH CARE EDUCATION/TRAINING PROGRAM

## 2019-08-03 PROCEDURE — 6360000002 HC RX W HCPCS: Performed by: STUDENT IN AN ORGANIZED HEALTH CARE EDUCATION/TRAINING PROGRAM

## 2019-08-03 PROCEDURE — 83880 ASSAY OF NATRIURETIC PEPTIDE: CPT

## 2019-08-03 PROCEDURE — 85025 COMPLETE CBC W/AUTO DIFF WBC: CPT

## 2019-08-03 PROCEDURE — 36415 COLL VENOUS BLD VENIPUNCTURE: CPT

## 2019-08-03 PROCEDURE — 6370000000 HC RX 637 (ALT 250 FOR IP): Performed by: SURGERY

## 2019-08-03 PROCEDURE — 80048 BASIC METABOLIC PNL TOTAL CA: CPT

## 2019-08-03 PROCEDURE — 94640 AIRWAY INHALATION TREATMENT: CPT

## 2019-08-03 PROCEDURE — 2000000000 HC ICU R&B

## 2019-08-03 PROCEDURE — 94669 MECHANICAL CHEST WALL OSCILL: CPT

## 2019-08-03 RX ORDER — FUROSEMIDE 10 MG/ML
60 INJECTION INTRAMUSCULAR; INTRAVENOUS ONCE
Status: COMPLETED | OUTPATIENT
Start: 2019-08-03 | End: 2019-08-03

## 2019-08-03 RX ADMIN — GABAPENTIN 300 MG: 300 CAPSULE ORAL at 08:37

## 2019-08-03 RX ADMIN — FOLIC ACID 1 MG: 1 TABLET ORAL at 08:34

## 2019-08-03 RX ADMIN — ACETAMINOPHEN 1000 MG: 500 TABLET ORAL at 04:08

## 2019-08-03 RX ADMIN — IBUPROFEN 400 MG: 400 TABLET, FILM COATED ORAL at 08:35

## 2019-08-03 RX ADMIN — VANCOMYCIN HYDROCHLORIDE 1000 MG: 1 INJECTION, SOLUTION INTRAVENOUS at 16:33

## 2019-08-03 RX ADMIN — Medication 2 MG: at 20:35

## 2019-08-03 RX ADMIN — Medication 100 MG: at 14:37

## 2019-08-03 RX ADMIN — CLONIDINE HYDROCHLORIDE 0.3 MG: 0.2 TABLET ORAL at 16:35

## 2019-08-03 RX ADMIN — ENOXAPARIN SODIUM 30 MG: 100 INJECTION SUBCUTANEOUS at 08:33

## 2019-08-03 RX ADMIN — Medication 400 MG: at 20:36

## 2019-08-03 RX ADMIN — CEFEPIME 2 G: 2 INJECTION, POWDER, FOR SOLUTION INTRAVENOUS at 00:00

## 2019-08-03 RX ADMIN — THERA TABS 1 TABLET: TAB at 08:37

## 2019-08-03 RX ADMIN — HALOPERIDOL LACTATE 5 MG: 5 INJECTION INTRAMUSCULAR at 18:47

## 2019-08-03 RX ADMIN — MAGNESIUM GLUCONATE 500 MG ORAL TABLET 400 MG: 500 TABLET ORAL at 08:37

## 2019-08-03 RX ADMIN — ACETAMINOPHEN 1000 MG: 500 TABLET ORAL at 18:35

## 2019-08-03 RX ADMIN — Medication 100 MG: at 08:34

## 2019-08-03 RX ADMIN — CLONIDINE HYDROCHLORIDE 0.3 MG: 0.2 TABLET ORAL at 23:56

## 2019-08-03 RX ADMIN — CEFEPIME 2 G: 2 INJECTION, POWDER, FOR SOLUTION INTRAVENOUS at 15:30

## 2019-08-03 RX ADMIN — ALBUTEROL SULFATE 2.5 MG: 2.5 SOLUTION RESPIRATORY (INHALATION) at 16:02

## 2019-08-03 RX ADMIN — QUETIAPINE FUMARATE 200 MG: 100 TABLET ORAL at 08:37

## 2019-08-03 RX ADMIN — FUROSEMIDE 60 MG: 10 INJECTION, SOLUTION INTRAMUSCULAR; INTRAVENOUS at 12:07

## 2019-08-03 RX ADMIN — Medication 400 MG: at 12:06

## 2019-08-03 RX ADMIN — ALBUTEROL SULFATE 2.5 MG: 2.5 SOLUTION RESPIRATORY (INHALATION) at 20:34

## 2019-08-03 RX ADMIN — IBUPROFEN 400 MG: 100 SUSPENSION ORAL at 20:34

## 2019-08-03 RX ADMIN — QUETIAPINE FUMARATE 200 MG: 100 TABLET ORAL at 20:35

## 2019-08-03 RX ADMIN — POTASSIUM CHLORIDE 20 MEQ: 1.5 POWDER, FOR SOLUTION ORAL at 08:36

## 2019-08-03 RX ADMIN — CLONIDINE HYDROCHLORIDE 0.3 MG: 0.2 TABLET ORAL at 08:35

## 2019-08-03 RX ADMIN — VANCOMYCIN HYDROCHLORIDE 1000 MG: 1 INJECTION, SOLUTION INTRAVENOUS at 04:09

## 2019-08-03 RX ADMIN — SODIUM CHLORIDE, PRESERVATIVE FREE 10 ML: 5 INJECTION INTRAVENOUS at 20:40

## 2019-08-03 RX ADMIN — SODIUM CHLORIDE, PRESERVATIVE FREE 10 ML: 5 INJECTION INTRAVENOUS at 08:40

## 2019-08-03 RX ADMIN — CEFEPIME 2 G: 2 INJECTION, POWDER, FOR SOLUTION INTRAVENOUS at 23:56

## 2019-08-03 RX ADMIN — GABAPENTIN 300 MG: 300 CAPSULE ORAL at 14:00

## 2019-08-03 RX ADMIN — POTASSIUM CHLORIDE 20 MEQ: 1.5 POWDER, FOR SOLUTION ORAL at 20:36

## 2019-08-03 RX ADMIN — CEFEPIME 2 G: 2 INJECTION, POWDER, FOR SOLUTION INTRAVENOUS at 08:00

## 2019-08-03 RX ADMIN — GABAPENTIN 300 MG: 300 CAPSULE ORAL at 20:35

## 2019-08-03 RX ADMIN — Medication 100 MG: at 20:36

## 2019-08-03 RX ADMIN — ONDANSETRON 4 MG: 2 INJECTION INTRAMUSCULAR; INTRAVENOUS at 20:40

## 2019-08-03 RX ADMIN — CLONIDINE HYDROCHLORIDE 0.3 MG: 0.2 TABLET ORAL at 00:00

## 2019-08-03 RX ADMIN — TRAZODONE HYDROCHLORIDE 100 MG: 100 TABLET ORAL at 20:35

## 2019-08-03 RX ADMIN — ACETAMINOPHEN 1000 MG: 500 TABLET ORAL at 11:55

## 2019-08-03 RX ADMIN — MICONAZOLE NITRATE: 20 CREAM TOPICAL at 10:09

## 2019-08-03 ASSESSMENT — PAIN SCALES - GENERAL
PAINLEVEL_OUTOF10: 0
PAINLEVEL_OUTOF10: 7
PAINLEVEL_OUTOF10: 0
PAINLEVEL_OUTOF10: 0
PAINLEVEL_OUTOF10: 3
PAINLEVEL_OUTOF10: 0
PAINLEVEL_OUTOF10: 8
PAINLEVEL_OUTOF10: 0
PAINLEVEL_OUTOF10: 0

## 2019-08-03 ASSESSMENT — PAIN DESCRIPTION - PAIN TYPE: TYPE: ACUTE PAIN

## 2019-08-03 ASSESSMENT — PAIN DESCRIPTION - DESCRIPTORS: DESCRIPTORS: ACHING

## 2019-08-03 ASSESSMENT — PAIN DESCRIPTION - ORIENTATION: ORIENTATION: LOWER

## 2019-08-03 ASSESSMENT — PAIN DESCRIPTION - LOCATION: LOCATION: BACK

## 2019-08-03 NOTE — PROGRESS NOTES
08/02/19 0826   Cough/Sputum   Sputum How Obtained Nasal tracheal   $Obtained Sample $Nasotracheal Suction   Cough Congested;Weak   Frequency Occasional   Sputum Amount Large   Sputum Color Tan;Yellow   Tenacity Thick       Pt tolerates NT sxn well with improved rhonchi post sxn.

## 2019-08-03 NOTE — PLAN OF CARE
Problem: Pain:  Description  Pain management should include both nonpharmacologic and pharmacologic interventions.   Goal: Pain level will decrease  Description  Pain level will decrease   Outcome: Ongoing  Goal: Control of acute pain  Description  Control of acute pain   Outcome: Ongoing  Goal: Control of chronic pain  Description  Control of chronic pain   Outcome: Ongoing     Problem: Falls - Risk of:  Goal: Will remain free from falls  Description  Will remain free from falls   Outcome: Met This Shift  Goal: Absence of physical injury  Description  Absence of physical injury   Outcome: Met This Shift     Problem: Nutrition  Goal: Optimal nutrition therapy  Outcome: Ongoing     Problem: Confusion - Acute:  Goal: Absence of continued neurological deterioration signs and symptoms  Description  Absence of continued neurological deterioration signs and symptoms  Outcome: Ongoing  Goal: Mental status will be restored to baseline  Description  Mental status will be restored to baseline  Outcome: Ongoing     Problem: Discharge Planning:  Goal: Ability to perform activities of daily living will improve  Description  Ability to perform activities of daily living will improve  Outcome: Ongoing  Goal: Participates in care planning  Description  Participates in care planning  Outcome: Ongoing     Problem: Injury - Risk of, Physical Injury:  Goal: Will remain free from falls  Description  Will remain free from falls   Outcome: Met This Shift  Goal: Absence of physical injury  Description  Absence of physical injury   Outcome: Met This Shift     Problem: Mood - Altered:  Goal: Mood stable  Description  Mood stable  Outcome: Ongoing  Goal: Absence of abusive behavior  Description  Absence of abusive behavior  Outcome: Ongoing  Goal: Verbalizations of feeling emotionally comfortable while being cared for will increase  Description  Verbalizations of feeling emotionally comfortable while being cared for will increase  Outcome:

## 2019-08-04 ENCOUNTER — APPOINTMENT (OUTPATIENT)
Dept: GENERAL RADIOLOGY | Age: 59
DRG: 957 | End: 2019-08-04
Payer: COMMERCIAL

## 2019-08-04 LAB
ABSOLUTE EOS #: 1.09 K/UL (ref 0–0.44)
ABSOLUTE IMMATURE GRANULOCYTE: 0.31 K/UL (ref 0–0.3)
ABSOLUTE LYMPH #: 1.55 K/UL (ref 1.1–3.7)
ABSOLUTE MONO #: 1.71 K/UL (ref 0.1–1.2)
ANION GAP SERPL CALCULATED.3IONS-SCNC: 14 MMOL/L (ref 9–17)
BASOPHILS # BLD: 1 % (ref 0–2)
BASOPHILS ABSOLUTE: 0.16 K/UL (ref 0–0.2)
BUN BLDV-MCNC: 32 MG/DL (ref 6–20)
BUN/CREAT BLD: ABNORMAL (ref 9–20)
CALCIUM SERPL-MCNC: 9.1 MG/DL (ref 8.6–10.4)
CHLORIDE BLD-SCNC: 105 MMOL/L (ref 98–107)
CO2: 23 MMOL/L (ref 20–31)
CREAT SERPL-MCNC: 1.04 MG/DL (ref 0.7–1.2)
CULTURE: NORMAL
CULTURE: NORMAL
DIFFERENTIAL TYPE: ABNORMAL
EKG ATRIAL RATE: 106 BPM
EKG P AXIS: 72 DEGREES
EKG P-R INTERVAL: 166 MS
EKG Q-T INTERVAL: 342 MS
EKG QRS DURATION: 84 MS
EKG QTC CALCULATION (BAZETT): 454 MS
EKG R AXIS: 8 DEGREES
EKG T AXIS: 32 DEGREES
EKG VENTRICULAR RATE: 106 BPM
EOSINOPHILS RELATIVE PERCENT: 7 % (ref 1–4)
GFR AFRICAN AMERICAN: >60 ML/MIN
GFR NON-AFRICAN AMERICAN: >60 ML/MIN
GFR SERPL CREATININE-BSD FRML MDRD: ABNORMAL ML/MIN/{1.73_M2}
GFR SERPL CREATININE-BSD FRML MDRD: ABNORMAL ML/MIN/{1.73_M2}
GLUCOSE BLD-MCNC: 111 MG/DL (ref 70–99)
HCT VFR BLD CALC: 24.5 % (ref 40.7–50.3)
HEMOGLOBIN: 7.5 G/DL (ref 13–17)
IMMATURE GRANULOCYTES: 2 %
LYMPHOCYTES # BLD: 10 % (ref 24–43)
Lab: NORMAL
Lab: NORMAL
MCH RBC QN AUTO: 29.8 PG (ref 25.2–33.5)
MCHC RBC AUTO-ENTMCNC: 30.6 G/DL (ref 28.4–34.8)
MCV RBC AUTO: 97.2 FL (ref 82.6–102.9)
MONOCYTES # BLD: 11 % (ref 3–12)
MORPHOLOGY: NORMAL
NRBC AUTOMATED: 0 PER 100 WBC
PDW BLD-RTO: 13.8 % (ref 11.8–14.4)
PLATELET # BLD: 716 K/UL (ref 138–453)
PLATELET ESTIMATE: ABNORMAL
PMV BLD AUTO: 9.4 FL (ref 8.1–13.5)
POTASSIUM SERPL-SCNC: 3.9 MMOL/L (ref 3.7–5.3)
RBC # BLD: 2.52 M/UL (ref 4.21–5.77)
RBC # BLD: ABNORMAL 10*6/UL
SEG NEUTROPHILS: 69 % (ref 36–65)
SEGMENTED NEUTROPHILS ABSOLUTE COUNT: 10.68 K/UL (ref 1.5–8.1)
SODIUM BLD-SCNC: 142 MMOL/L (ref 135–144)
SPECIMEN DESCRIPTION: NORMAL
SPECIMEN DESCRIPTION: NORMAL
WBC # BLD: 15.5 K/UL (ref 3.5–11.3)
WBC # BLD: ABNORMAL 10*3/UL

## 2019-08-04 PROCEDURE — 6370000000 HC RX 637 (ALT 250 FOR IP): Performed by: STUDENT IN AN ORGANIZED HEALTH CARE EDUCATION/TRAINING PROGRAM

## 2019-08-04 PROCEDURE — 6360000002 HC RX W HCPCS: Performed by: STUDENT IN AN ORGANIZED HEALTH CARE EDUCATION/TRAINING PROGRAM

## 2019-08-04 PROCEDURE — 2580000003 HC RX 258: Performed by: STUDENT IN AN ORGANIZED HEALTH CARE EDUCATION/TRAINING PROGRAM

## 2019-08-04 PROCEDURE — 94640 AIRWAY INHALATION TREATMENT: CPT

## 2019-08-04 PROCEDURE — 85025 COMPLETE CBC W/AUTO DIFF WBC: CPT

## 2019-08-04 PROCEDURE — 94669 MECHANICAL CHEST WALL OSCILL: CPT

## 2019-08-04 PROCEDURE — 80048 BASIC METABOLIC PNL TOTAL CA: CPT

## 2019-08-04 PROCEDURE — 6370000000 HC RX 637 (ALT 250 FOR IP): Performed by: SURGERY

## 2019-08-04 PROCEDURE — 36415 COLL VENOUS BLD VENIPUNCTURE: CPT

## 2019-08-04 PROCEDURE — 31720 CLEARANCE OF AIRWAYS: CPT

## 2019-08-04 PROCEDURE — 94762 N-INVAS EAR/PLS OXIMTRY CONT: CPT

## 2019-08-04 PROCEDURE — 2000000000 HC ICU R&B

## 2019-08-04 PROCEDURE — 71045 X-RAY EXAM CHEST 1 VIEW: CPT

## 2019-08-04 PROCEDURE — 2700000000 HC OXYGEN THERAPY PER DAY

## 2019-08-04 RX ADMIN — CEFEPIME 2 G: 2 INJECTION, POWDER, FOR SOLUTION INTRAVENOUS at 15:54

## 2019-08-04 RX ADMIN — CEFEPIME 2 G: 2 INJECTION, POWDER, FOR SOLUTION INTRAVENOUS at 07:58

## 2019-08-04 RX ADMIN — THERA TABS 1 TABLET: TAB at 08:00

## 2019-08-04 RX ADMIN — CLONIDINE HYDROCHLORIDE 0.3 MG: 0.2 TABLET ORAL at 08:00

## 2019-08-04 RX ADMIN — GABAPENTIN 300 MG: 300 CAPSULE ORAL at 20:04

## 2019-08-04 RX ADMIN — ENOXAPARIN SODIUM 30 MG: 100 INJECTION SUBCUTANEOUS at 07:59

## 2019-08-04 RX ADMIN — Medication 100 MG: at 14:43

## 2019-08-04 RX ADMIN — Medication 400 MG: at 16:36

## 2019-08-04 RX ADMIN — VANCOMYCIN HYDROCHLORIDE 1000 MG: 1 INJECTION, SOLUTION INTRAVENOUS at 06:22

## 2019-08-04 RX ADMIN — QUETIAPINE FUMARATE 200 MG: 100 TABLET ORAL at 20:04

## 2019-08-04 RX ADMIN — ACETAMINOPHEN 1000 MG: 500 TABLET ORAL at 04:06

## 2019-08-04 RX ADMIN — ALBUTEROL SULFATE 2.5 MG: 2.5 SOLUTION RESPIRATORY (INHALATION) at 16:46

## 2019-08-04 RX ADMIN — MICONAZOLE NITRATE: 20 CREAM TOPICAL at 12:14

## 2019-08-04 RX ADMIN — Medication 10 ML: at 20:15

## 2019-08-04 RX ADMIN — GABAPENTIN 300 MG: 300 CAPSULE ORAL at 14:43

## 2019-08-04 RX ADMIN — GABAPENTIN 300 MG: 300 CAPSULE ORAL at 08:00

## 2019-08-04 RX ADMIN — ENOXAPARIN SODIUM 30 MG: 100 INJECTION SUBCUTANEOUS at 20:04

## 2019-08-04 RX ADMIN — QUETIAPINE FUMARATE 200 MG: 100 TABLET ORAL at 08:00

## 2019-08-04 RX ADMIN — ALBUTEROL SULFATE 2.5 MG: 2.5 SOLUTION RESPIRATORY (INHALATION) at 20:22

## 2019-08-04 RX ADMIN — SODIUM CHLORIDE, PRESERVATIVE FREE 10 ML: 5 INJECTION INTRAVENOUS at 08:00

## 2019-08-04 RX ADMIN — POTASSIUM CHLORIDE 20 MEQ: 1.5 POWDER, FOR SOLUTION ORAL at 08:00

## 2019-08-04 RX ADMIN — POTASSIUM CHLORIDE 20 MEQ: 1.5 POWDER, FOR SOLUTION ORAL at 20:04

## 2019-08-04 RX ADMIN — ALBUTEROL SULFATE 2.5 MG: 2.5 SOLUTION RESPIRATORY (INHALATION) at 08:19

## 2019-08-04 RX ADMIN — Medication 2 MG: at 20:04

## 2019-08-04 RX ADMIN — ACETAMINOPHEN 1000 MG: 500 TABLET ORAL at 20:05

## 2019-08-04 RX ADMIN — MICONAZOLE NITRATE: 20 CREAM TOPICAL at 20:04

## 2019-08-04 RX ADMIN — Medication 100 MG: at 20:12

## 2019-08-04 RX ADMIN — Medication 100 MG: at 08:00

## 2019-08-04 RX ADMIN — FOLIC ACID 1 MG: 1 TABLET ORAL at 08:00

## 2019-08-04 RX ADMIN — Medication 400 MG: at 08:21

## 2019-08-04 RX ADMIN — ACETAMINOPHEN 1000 MG: 500 TABLET ORAL at 10:56

## 2019-08-04 RX ADMIN — Medication 400 MG: at 20:23

## 2019-08-04 RX ADMIN — Medication 10 ML: at 08:01

## 2019-08-04 RX ADMIN — TRAZODONE HYDROCHLORIDE 100 MG: 100 TABLET ORAL at 20:12

## 2019-08-04 ASSESSMENT — PAIN SCALES - GENERAL
PAINLEVEL_OUTOF10: 0

## 2019-08-04 NOTE — PLAN OF CARE
perceptions  8/4/2019 0022 by Nilam Alston RN  Outcome: Ongoing  8/3/2019 1717 by Beth James RN  Outcome: Ongoing  Goal: Able to distinguish between reality-based and nonreality-based thinking  Description  Able to distinguish between reality-based and nonreality-based thinking  8/4/2019 0022 by Nilam Alston RN  Outcome: Ongoing  8/3/2019 1717 by Beth James RN  Outcome: Ongoing  Goal: Able to interrupt nonreality-based thinking  Description  Able to interrupt nonreality-based thinking  8/4/2019 0022 by Nilam Alston RN  Outcome: Ongoing  8/3/2019 1717 by Beth James RN  Outcome: Ongoing     Problem: Sleep Pattern Disturbance:  Goal: Appears well-rested  Description  Appears well-rested  8/4/2019 0022 by Nilam Alston RN  Outcome: Ongoing  8/3/2019 1717 by Beth James RN  Outcome: Ongoing     Problem: OXYGENATION/RESPIRATORY FUNCTION  Goal: Patient will maintain patent airway  8/4/2019 0022 by Nilam Alston RN  Outcome: Ongoing  8/3/2019 1717 by Beth James RN  Outcome: Ongoing  Goal: Patient will achieve/maintain normal respiratory rate/effort  Description  Respiratory rate and effort will be within normal limits for the patient   8/4/2019 0022 by Nilam Alston RN  Outcome: Ongoing  8/3/2019 1717 by Beth James RN  Outcome: Ongoing     Problem: SKIN INTEGRITY  Goal: Skin integrity is maintained or improved  8/4/2019 0022 by Nilam Alston RN  Outcome: Ongoing  8/3/2019 1717 by Beth James RN  Outcome: Ongoing     Problem: Nutrition  Goal: Optimal nutrition therapy  8/4/2019 0022 by Nilam Alston RN  Outcome: Ongoing  8/3/2019 1717 by Beth James RN  Outcome: Ongoing     Problem: Restraint Use - Nonviolent/Non-Self-Destructive Behavior  Goal: Absence of restraint-related injury  8/3/2019 1717 by Beth James RN  Outcome: Ongoing  Goal: Absence of restraint indications  8/3/2019 1717 by Beth James RN  Outcome: Ongoing     Problem: Confusion - Acute:  Goal: Mental status will be restored to baseline  Description  Mental

## 2019-08-05 LAB
ABSOLUTE EOS #: 0.91 K/UL (ref 0–0.44)
ABSOLUTE IMMATURE GRANULOCYTE: 0.3 K/UL (ref 0–0.3)
ABSOLUTE LYMPH #: 1.66 K/UL (ref 1.1–3.7)
ABSOLUTE MONO #: 1.81 K/UL (ref 0.1–1.2)
ANION GAP SERPL CALCULATED.3IONS-SCNC: 15 MMOL/L (ref 9–17)
BASOPHILS # BLD: 1 % (ref 0–2)
BASOPHILS ABSOLUTE: 0.15 K/UL (ref 0–0.2)
BUN BLDV-MCNC: 29 MG/DL (ref 6–20)
BUN/CREAT BLD: ABNORMAL (ref 9–20)
CALCIUM SERPL-MCNC: 9.4 MG/DL (ref 8.6–10.4)
CHLORIDE BLD-SCNC: 106 MMOL/L (ref 98–107)
CO2: 23 MMOL/L (ref 20–31)
CREAT SERPL-MCNC: 1 MG/DL (ref 0.7–1.2)
DIFFERENTIAL TYPE: ABNORMAL
EOSINOPHILS RELATIVE PERCENT: 6 % (ref 1–4)
GFR AFRICAN AMERICAN: >60 ML/MIN
GFR NON-AFRICAN AMERICAN: >60 ML/MIN
GFR SERPL CREATININE-BSD FRML MDRD: ABNORMAL ML/MIN/{1.73_M2}
GFR SERPL CREATININE-BSD FRML MDRD: ABNORMAL ML/MIN/{1.73_M2}
GLUCOSE BLD-MCNC: 106 MG/DL (ref 70–99)
HCT VFR BLD CALC: 26.6 % (ref 40.7–50.3)
HEMOGLOBIN: 8.1 G/DL (ref 13–17)
IMMATURE GRANULOCYTES: 2 %
LYMPHOCYTES # BLD: 11 % (ref 24–43)
MCH RBC QN AUTO: 29.3 PG (ref 25.2–33.5)
MCHC RBC AUTO-ENTMCNC: 30.5 G/DL (ref 28.4–34.8)
MCV RBC AUTO: 96.4 FL (ref 82.6–102.9)
MONOCYTES # BLD: 12 % (ref 3–12)
MORPHOLOGY: NORMAL
NRBC AUTOMATED: 0 PER 100 WBC
PDW BLD-RTO: 14 % (ref 11.8–14.4)
PLATELET # BLD: 742 K/UL (ref 138–453)
PLATELET ESTIMATE: ABNORMAL
PMV BLD AUTO: 9.6 FL (ref 8.1–13.5)
POTASSIUM SERPL-SCNC: 4.2 MMOL/L (ref 3.7–5.3)
RBC # BLD: 2.76 M/UL (ref 4.21–5.77)
RBC # BLD: ABNORMAL 10*6/UL
SEG NEUTROPHILS: 68 % (ref 36–65)
SEGMENTED NEUTROPHILS ABSOLUTE COUNT: 10.27 K/UL (ref 1.5–8.1)
SODIUM BLD-SCNC: 144 MMOL/L (ref 135–144)
WBC # BLD: 15.1 K/UL (ref 3.5–11.3)
WBC # BLD: ABNORMAL 10*3/UL

## 2019-08-05 PROCEDURE — 6370000000 HC RX 637 (ALT 250 FOR IP): Performed by: STUDENT IN AN ORGANIZED HEALTH CARE EDUCATION/TRAINING PROGRAM

## 2019-08-05 PROCEDURE — 80048 BASIC METABOLIC PNL TOTAL CA: CPT

## 2019-08-05 PROCEDURE — 2580000003 HC RX 258: Performed by: STUDENT IN AN ORGANIZED HEALTH CARE EDUCATION/TRAINING PROGRAM

## 2019-08-05 PROCEDURE — 31720 CLEARANCE OF AIRWAYS: CPT

## 2019-08-05 PROCEDURE — 85025 COMPLETE CBC W/AUTO DIFF WBC: CPT

## 2019-08-05 PROCEDURE — 6360000002 HC RX W HCPCS: Performed by: STUDENT IN AN ORGANIZED HEALTH CARE EDUCATION/TRAINING PROGRAM

## 2019-08-05 PROCEDURE — 97530 THERAPEUTIC ACTIVITIES: CPT

## 2019-08-05 PROCEDURE — 76937 US GUIDE VASCULAR ACCESS: CPT

## 2019-08-05 PROCEDURE — 94761 N-INVAS EAR/PLS OXIMETRY MLT: CPT

## 2019-08-05 PROCEDURE — 94640 AIRWAY INHALATION TREATMENT: CPT

## 2019-08-05 PROCEDURE — 2000000000 HC ICU R&B

## 2019-08-05 PROCEDURE — 36415 COLL VENOUS BLD VENIPUNCTURE: CPT

## 2019-08-05 PROCEDURE — 6370000000 HC RX 637 (ALT 250 FOR IP): Performed by: SURGERY

## 2019-08-05 PROCEDURE — 2500000003 HC RX 250 WO HCPCS: Performed by: STUDENT IN AN ORGANIZED HEALTH CARE EDUCATION/TRAINING PROGRAM

## 2019-08-05 RX ORDER — METOPROLOL TARTRATE 5 MG/5ML
5 INJECTION INTRAVENOUS ONCE
Status: COMPLETED | OUTPATIENT
Start: 2019-08-05 | End: 2019-08-05

## 2019-08-05 RX ADMIN — SODIUM CHLORIDE, PRESERVATIVE FREE 10 ML: 5 INJECTION INTRAVENOUS at 09:06

## 2019-08-05 RX ADMIN — Medication 2 MG: at 20:11

## 2019-08-05 RX ADMIN — Medication 10 ML: at 09:06

## 2019-08-05 RX ADMIN — QUETIAPINE FUMARATE 200 MG: 100 TABLET ORAL at 09:03

## 2019-08-05 RX ADMIN — ALBUTEROL SULFATE 2.5 MG: 2.5 SOLUTION RESPIRATORY (INHALATION) at 16:09

## 2019-08-05 RX ADMIN — Medication 100 MG: at 20:11

## 2019-08-05 RX ADMIN — Medication 400 MG: at 08:41

## 2019-08-05 RX ADMIN — CEFEPIME 2 G: 2 INJECTION, POWDER, FOR SOLUTION INTRAVENOUS at 15:19

## 2019-08-05 RX ADMIN — ACETAMINOPHEN 1000 MG: 500 TABLET ORAL at 03:35

## 2019-08-05 RX ADMIN — MICONAZOLE NITRATE: 20 CREAM TOPICAL at 20:11

## 2019-08-05 RX ADMIN — THERA TABS 1 TABLET: TAB at 09:05

## 2019-08-05 RX ADMIN — Medication 100 MG: at 15:19

## 2019-08-05 RX ADMIN — Medication 400 MG: at 20:22

## 2019-08-05 RX ADMIN — POTASSIUM CHLORIDE 20 MEQ: 1.5 POWDER, FOR SOLUTION ORAL at 09:05

## 2019-08-05 RX ADMIN — CEFEPIME 2 G: 2 INJECTION, POWDER, FOR SOLUTION INTRAVENOUS at 00:46

## 2019-08-05 RX ADMIN — Medication 400 MG: at 16:09

## 2019-08-05 RX ADMIN — CEFEPIME 2 G: 2 INJECTION, POWDER, FOR SOLUTION INTRAVENOUS at 23:28

## 2019-08-05 RX ADMIN — FOLIC ACID 1 MG: 1 TABLET ORAL at 09:05

## 2019-08-05 RX ADMIN — METOPROLOL TARTRATE 5 MG: 5 INJECTION, SOLUTION INTRAVENOUS at 23:35

## 2019-08-05 RX ADMIN — Medication 100 MG: at 09:05

## 2019-08-05 RX ADMIN — ACETAMINOPHEN 1000 MG: 500 TABLET ORAL at 11:56

## 2019-08-05 RX ADMIN — ALBUTEROL SULFATE 2.5 MG: 2.5 SOLUTION RESPIRATORY (INHALATION) at 20:22

## 2019-08-05 RX ADMIN — CEFEPIME 2 G: 2 INJECTION, POWDER, FOR SOLUTION INTRAVENOUS at 09:02

## 2019-08-05 RX ADMIN — TRAZODONE HYDROCHLORIDE 100 MG: 100 TABLET ORAL at 20:11

## 2019-08-05 RX ADMIN — POTASSIUM CHLORIDE 20 MEQ: 1.5 POWDER, FOR SOLUTION ORAL at 20:11

## 2019-08-05 RX ADMIN — ALBUTEROL SULFATE 2.5 MG: 2.5 SOLUTION RESPIRATORY (INHALATION) at 08:41

## 2019-08-05 RX ADMIN — SODIUM CHLORIDE, PRESERVATIVE FREE 10 ML: 5 INJECTION INTRAVENOUS at 20:11

## 2019-08-05 RX ADMIN — QUETIAPINE FUMARATE 200 MG: 100 TABLET ORAL at 20:11

## 2019-08-05 RX ADMIN — ACETAMINOPHEN 1000 MG: 500 TABLET ORAL at 18:21

## 2019-08-05 RX ADMIN — ENOXAPARIN SODIUM 30 MG: 100 INJECTION SUBCUTANEOUS at 09:02

## 2019-08-05 RX ADMIN — MICONAZOLE NITRATE: 20 CREAM TOPICAL at 09:05

## 2019-08-05 RX ADMIN — ENOXAPARIN SODIUM 30 MG: 100 INJECTION SUBCUTANEOUS at 20:11

## 2019-08-05 ASSESSMENT — PAIN SCALES - WONG BAKER
WONGBAKER_NUMERICALRESPONSE: 0

## 2019-08-05 ASSESSMENT — PAIN SCALES - GENERAL
PAINLEVEL_OUTOF10: 0

## 2019-08-05 ASSESSMENT — PAIN DESCRIPTION - DESCRIPTORS: DESCRIPTORS: ACHING

## 2019-08-05 ASSESSMENT — PAIN DESCRIPTION - ORIENTATION: ORIENTATION: LOWER

## 2019-08-05 ASSESSMENT — PAIN DESCRIPTION - ONSET: ONSET: GRADUAL

## 2019-08-05 ASSESSMENT — PAIN DESCRIPTION - LOCATION: LOCATION: BACK

## 2019-08-05 ASSESSMENT — PAIN DESCRIPTION - PAIN TYPE: TYPE: ACUTE PAIN

## 2019-08-05 ASSESSMENT — PAIN - FUNCTIONAL ASSESSMENT: PAIN_FUNCTIONAL_ASSESSMENT: PREVENTS OR INTERFERES SOME ACTIVE ACTIVITIES AND ADLS

## 2019-08-05 ASSESSMENT — PAIN DESCRIPTION - FREQUENCY: FREQUENCY: CONTINUOUS

## 2019-08-05 NOTE — CARE COORDINATION
Patient remains confused with bedside sitter at bedside.  Noted denial for LTACH in chart, will continue to monitor progress

## 2019-08-05 NOTE — PLAN OF CARE
Problem: Pain:  Goal: Pain level will decrease  Description  Pain level will decrease   Outcome: Ongoing  Goal: Control of acute pain  Description  Control of acute pain   Outcome: Ongoing  Goal: Control of chronic pain  Description  Control of chronic pain   Outcome: Ongoing     Problem: Falls - Risk of:  Goal: Will remain free from falls  Description  Will remain free from falls   Outcome: Ongoing  Goal: Absence of physical injury  Description  Absence of physical injury   Outcome: Ongoing     Problem: Confusion - Acute:  Goal: Absence of continued neurological deterioration signs and symptoms  Description  Absence of continued neurological deterioration signs and symptoms  Outcome: Ongoing  Goal: Mental status will be restored to baseline  Description  Mental status will be restored to baseline  Outcome: Ongoing     Problem: Discharge Planning:  Goal: Ability to perform activities of daily living will improve  Description  Ability to perform activities of daily living will improve  Outcome: Ongoing  Goal: Participates in care planning  Description  Participates in care planning  Outcome: Ongoing     Problem: Injury - Risk of, Physical Injury:  Goal: Will remain free from falls  Description  Will remain free from falls   Outcome: Ongoing  Goal: Absence of physical injury  Description  Absence of physical injury   Outcome: Ongoing     Problem: Mood - Altered:  Goal: Mood stable  Description  Mood stable  Outcome: Ongoing  Goal: Absence of abusive behavior  Description  Absence of abusive behavior  Outcome: Ongoing  Goal: Verbalizations of feeling emotionally comfortable while being cared for will increase  Description  Verbalizations of feeling emotionally comfortable while being cared for will increase  Outcome: Ongoing     Problem: Psychomotor Activity - Altered:  Goal: Absence of psychomotor disturbance signs and symptoms  Description  Absence of psychomotor disturbance signs and symptoms  Outcome: Ongoing

## 2019-08-05 NOTE — PROGRESS NOTES
examed. No acute events overnight. Mentation improving somewhat. Denies any f/c, n/c, sob     OBJECTIVE  VITALS: Temp: Temp: 98 °F (36.7 °C)Temp  Av.1 °F (36.7 °C)  Min: 97.5 °F (36.4 °C)  Max: 98.6 °F (37 °C) BP Systolic (50JDJ), SJD:729 , Min:116 , GAC:863   Diastolic (24UXX), RPU:79, Min:59, Max:96   Pulse Pulse  Av.1  Min: 99  Max: 119 Resp Resp  Av.5  Min: 9  Max: 29 Pulse ox SpO2  Av.1 %  Min: 94 %  Max: 100 %    CONSTITUTIONAL: alert, tracks, answer questions, just disoriented   HEENT: no conjunctival pallor, EOM intact, Trachea midline, NGT without OGT  LUNGS: equal chest rise and fall, no audible Wheezes or rhonchi  CV: RRR  GI: soft, nt.  Nd, no rebound or guarding  MUSCULOSKELETAL: moving all extremities  NEUROLOGIC: GCS 15, Alert  SKIN: lines C/D/I     LAB:  CBC:   Recent Labs     19  0446 19  0541 19  0444   WBC 17.7* 15.5* 15.1*   HGB 7.2* 7.5* 8.1*   HCT 24.9* 24.5* 26.6*   MCV 99.6 97.2 96.4   * 716* 742*     BMP:   Recent Labs     19  1550 19  0541 19  0444   * 142 144   K 3.9 3.9 4.2    105 106   CO2 21 23 23   BUN 28* 32* 29*   CREATININE 1.10 1.04 1.00   GLUCOSE 122* 111* 106*     Lamberto Cough, DO  19, 9:25 AM

## 2019-08-06 ENCOUNTER — APPOINTMENT (OUTPATIENT)
Dept: GENERAL RADIOLOGY | Age: 59
DRG: 957 | End: 2019-08-06
Payer: COMMERCIAL

## 2019-08-06 LAB
ABSOLUTE EOS #: 0.82 K/UL (ref 0–0.4)
ABSOLUTE IMMATURE GRANULOCYTE: 0.16 K/UL (ref 0–0.3)
ABSOLUTE LYMPH #: 1.8 K/UL (ref 1–4.8)
ABSOLUTE MONO #: 2.13 K/UL (ref 0.1–0.8)
ANION GAP SERPL CALCULATED.3IONS-SCNC: 16 MMOL/L (ref 9–17)
BASOPHILS # BLD: 0 % (ref 0–2)
BASOPHILS ABSOLUTE: 0 K/UL (ref 0–0.2)
BUN BLDV-MCNC: 30 MG/DL (ref 6–20)
BUN/CREAT BLD: ABNORMAL (ref 9–20)
CALCIUM SERPL-MCNC: 10 MG/DL (ref 8.6–10.4)
CHLORIDE BLD-SCNC: 105 MMOL/L (ref 98–107)
CO2: 22 MMOL/L (ref 20–31)
CREAT SERPL-MCNC: 0.95 MG/DL (ref 0.7–1.2)
DIFFERENTIAL TYPE: ABNORMAL
EOSINOPHILS RELATIVE PERCENT: 5 % (ref 1–4)
GFR AFRICAN AMERICAN: >60 ML/MIN
GFR NON-AFRICAN AMERICAN: >60 ML/MIN
GFR SERPL CREATININE-BSD FRML MDRD: ABNORMAL ML/MIN/{1.73_M2}
GFR SERPL CREATININE-BSD FRML MDRD: ABNORMAL ML/MIN/{1.73_M2}
GLUCOSE BLD-MCNC: 123 MG/DL (ref 70–99)
HCT VFR BLD CALC: 28.3 % (ref 40.7–50.3)
HEMOGLOBIN: 8.6 G/DL (ref 13–17)
IMMATURE GRANULOCYTES: 1 %
LYMPHOCYTES # BLD: 11 % (ref 24–44)
MCH RBC QN AUTO: 29.3 PG (ref 25.2–33.5)
MCHC RBC AUTO-ENTMCNC: 30.4 G/DL (ref 28.4–34.8)
MCV RBC AUTO: 96.3 FL (ref 82.6–102.9)
MONOCYTES # BLD: 13 % (ref 1–7)
MORPHOLOGY: NORMAL
NRBC AUTOMATED: 0 PER 100 WBC
PDW BLD-RTO: 14.1 % (ref 11.8–14.4)
PLATELET # BLD: 686 K/UL (ref 138–453)
PLATELET ESTIMATE: ABNORMAL
PMV BLD AUTO: 9.5 FL (ref 8.1–13.5)
POTASSIUM SERPL-SCNC: 4.2 MMOL/L (ref 3.7–5.3)
RBC # BLD: 2.94 M/UL (ref 4.21–5.77)
RBC # BLD: ABNORMAL 10*6/UL
SEG NEUTROPHILS: 70 % (ref 36–66)
SEGMENTED NEUTROPHILS ABSOLUTE COUNT: 11.49 K/UL (ref 1.8–7.7)
SODIUM BLD-SCNC: 143 MMOL/L (ref 135–144)
WBC # BLD: 16.4 K/UL (ref 3.5–11.3)
WBC # BLD: ABNORMAL 10*3/UL

## 2019-08-06 PROCEDURE — 6370000000 HC RX 637 (ALT 250 FOR IP): Performed by: STUDENT IN AN ORGANIZED HEALTH CARE EDUCATION/TRAINING PROGRAM

## 2019-08-06 PROCEDURE — 6360000002 HC RX W HCPCS: Performed by: STUDENT IN AN ORGANIZED HEALTH CARE EDUCATION/TRAINING PROGRAM

## 2019-08-06 PROCEDURE — 6370000000 HC RX 637 (ALT 250 FOR IP): Performed by: SURGERY

## 2019-08-06 PROCEDURE — 80048 BASIC METABOLIC PNL TOTAL CA: CPT

## 2019-08-06 PROCEDURE — 2580000003 HC RX 258: Performed by: STUDENT IN AN ORGANIZED HEALTH CARE EDUCATION/TRAINING PROGRAM

## 2019-08-06 PROCEDURE — 97535 SELF CARE MNGMENT TRAINING: CPT

## 2019-08-06 PROCEDURE — 85025 COMPLETE CBC W/AUTO DIFF WBC: CPT

## 2019-08-06 PROCEDURE — 97116 GAIT TRAINING THERAPY: CPT

## 2019-08-06 PROCEDURE — 2060000000 HC ICU INTERMEDIATE R&B

## 2019-08-06 PROCEDURE — 36415 COLL VENOUS BLD VENIPUNCTURE: CPT

## 2019-08-06 PROCEDURE — 2500000003 HC RX 250 WO HCPCS: Performed by: STUDENT IN AN ORGANIZED HEALTH CARE EDUCATION/TRAINING PROGRAM

## 2019-08-06 PROCEDURE — 97112 NEUROMUSCULAR REEDUCATION: CPT

## 2019-08-06 PROCEDURE — 97530 THERAPEUTIC ACTIVITIES: CPT

## 2019-08-06 PROCEDURE — 94640 AIRWAY INHALATION TREATMENT: CPT

## 2019-08-06 PROCEDURE — 97166 OT EVAL MOD COMPLEX 45 MIN: CPT

## 2019-08-06 PROCEDURE — 71045 X-RAY EXAM CHEST 1 VIEW: CPT

## 2019-08-06 PROCEDURE — 31720 CLEARANCE OF AIRWAYS: CPT

## 2019-08-06 PROCEDURE — 94761 N-INVAS EAR/PLS OXIMETRY MLT: CPT

## 2019-08-06 RX ORDER — METOPROLOL TARTRATE 5 MG/5ML
5 INJECTION INTRAVENOUS ONCE
Status: COMPLETED | OUTPATIENT
Start: 2019-08-06 | End: 2019-08-06

## 2019-08-06 RX ORDER — PROPRANOLOL HYDROCHLORIDE 10 MG/1
25 TABLET ORAL 2 TIMES DAILY
Status: DISCONTINUED | OUTPATIENT
Start: 2019-08-06 | End: 2019-08-15 | Stop reason: HOSPADM

## 2019-08-06 RX ADMIN — THERA TABS 1 TABLET: TAB at 07:57

## 2019-08-06 RX ADMIN — SODIUM CHLORIDE, PRESERVATIVE FREE 10 ML: 5 INJECTION INTRAVENOUS at 19:59

## 2019-08-06 RX ADMIN — POTASSIUM CHLORIDE 20 MEQ: 1.5 POWDER, FOR SOLUTION ORAL at 07:57

## 2019-08-06 RX ADMIN — TRAZODONE HYDROCHLORIDE 100 MG: 100 TABLET ORAL at 19:58

## 2019-08-06 RX ADMIN — FOLIC ACID 1 MG: 1 TABLET ORAL at 08:21

## 2019-08-06 RX ADMIN — ACETAMINOPHEN 1000 MG: 500 TABLET ORAL at 19:58

## 2019-08-06 RX ADMIN — ALBUTEROL SULFATE 2.5 MG: 2.5 SOLUTION RESPIRATORY (INHALATION) at 08:21

## 2019-08-06 RX ADMIN — ENOXAPARIN SODIUM 30 MG: 100 INJECTION SUBCUTANEOUS at 08:21

## 2019-08-06 RX ADMIN — CEFEPIME 2 G: 2 INJECTION, POWDER, FOR SOLUTION INTRAVENOUS at 23:27

## 2019-08-06 RX ADMIN — ALBUTEROL SULFATE 2.5 MG: 2.5 SOLUTION RESPIRATORY (INHALATION) at 21:01

## 2019-08-06 RX ADMIN — Medication 400 MG: at 08:22

## 2019-08-06 RX ADMIN — POTASSIUM CHLORIDE 20 MEQ: 1.5 POWDER, FOR SOLUTION ORAL at 19:58

## 2019-08-06 RX ADMIN — ACETAMINOPHEN 1000 MG: 500 TABLET ORAL at 04:11

## 2019-08-06 RX ADMIN — Medication 100 MG: at 19:58

## 2019-08-06 RX ADMIN — ENOXAPARIN SODIUM 30 MG: 100 INJECTION SUBCUTANEOUS at 19:59

## 2019-08-06 RX ADMIN — PROPRANOLOL HYDROCHLORIDE 25 MG: 10 TABLET ORAL at 13:44

## 2019-08-06 RX ADMIN — PROPRANOLOL HYDROCHLORIDE 25 MG: 10 TABLET ORAL at 19:57

## 2019-08-06 RX ADMIN — Medication 400 MG: at 14:12

## 2019-08-06 RX ADMIN — ANTI-FUNGAL POWDER MICONAZOLE NITRATE TALC FREE: 1.42 POWDER TOPICAL at 19:59

## 2019-08-06 RX ADMIN — SODIUM CHLORIDE, PRESERVATIVE FREE 10 ML: 5 INJECTION INTRAVENOUS at 08:13

## 2019-08-06 RX ADMIN — Medication 400 MG: at 21:06

## 2019-08-06 RX ADMIN — ANTI-FUNGAL POWDER MICONAZOLE NITRATE TALC FREE: 1.42 POWDER TOPICAL at 15:29

## 2019-08-06 RX ADMIN — METOPROLOL TARTRATE 5 MG: 5 INJECTION, SOLUTION INTRAVENOUS at 09:53

## 2019-08-06 RX ADMIN — MICONAZOLE NITRATE: 20 CREAM TOPICAL at 08:25

## 2019-08-06 RX ADMIN — QUETIAPINE FUMARATE 200 MG: 100 TABLET ORAL at 07:51

## 2019-08-06 RX ADMIN — ALBUTEROL SULFATE 2.5 MG: 2.5 SOLUTION RESPIRATORY (INHALATION) at 14:12

## 2019-08-06 RX ADMIN — Medication 2 MG: at 19:58

## 2019-08-06 RX ADMIN — QUETIAPINE FUMARATE 200 MG: 100 TABLET ORAL at 19:58

## 2019-08-06 RX ADMIN — CEFEPIME 2 G: 2 INJECTION, POWDER, FOR SOLUTION INTRAVENOUS at 07:49

## 2019-08-06 RX ADMIN — CEFEPIME 2 G: 2 INJECTION, POWDER, FOR SOLUTION INTRAVENOUS at 15:30

## 2019-08-06 RX ADMIN — Medication 10 ML: at 08:13

## 2019-08-06 RX ADMIN — Medication 100 MG: at 07:57

## 2019-08-06 RX ADMIN — ACETAMINOPHEN 1000 MG: 500 TABLET ORAL at 11:17

## 2019-08-06 RX ADMIN — Medication 100 MG: at 13:49

## 2019-08-06 ASSESSMENT — PAIN SCALES - GENERAL
PAINLEVEL_OUTOF10: 0
PAINLEVEL_OUTOF10: 0
PAINLEVEL_OUTOF10: 9
PAINLEVEL_OUTOF10: 9
PAINLEVEL_OUTOF10: 0

## 2019-08-06 ASSESSMENT — PAIN DESCRIPTION - ORIENTATION: ORIENTATION: RIGHT

## 2019-08-06 ASSESSMENT — PAIN SCALES - WONG BAKER: WONGBAKER_NUMERICALRESPONSE: 4

## 2019-08-06 ASSESSMENT — PAIN DESCRIPTION - LOCATION: LOCATION: ARM

## 2019-08-06 NOTE — PROGRESS NOTES
guard assistance  Transfers  Sit to Stand: Moderate Assistance  Stand to sit: Minimal Assistance(for safety since pt is impulsive)  Comment: pt at risk for falls, not follwing directions, impulsive and agitated, very figidity  Ambulation  Ambulation?: Yes  Ambulation 1  Surface: level tile  Device: Hand-Held Assist(x2)  Assistance: Maximum assistance  Quality of Gait: ataxic, impulsive  Gait Deviations: Staggers  Distance: 8 ft bedside chair-opposite side of bed     Balance  Posture: Good  Sitting - Static: Fair;+  Sitting - Dynamic: Fair;+  Standing - Static: Fair;-  Standing - Dynamic: Fair;-      Comment: attempts to stand and walk x3 w/pt responding w/mx A x2 on 4th attempt using constant encouragment from RN, PTA and pt's son. Goals  Short term goals  Time Frame for Short term goals: 10 visits  Short term goal 1: transfers with SBA  Short term goal 2: amb 150 ft with a RW x SBA  Short term goal 3: ascend/descend 4 steps with SBA  Short term goal 4:  To be indepedent with bed mobility  Patient Goals   Patient goals : Return home    Plan  Times per week: 1-2x daily  Times per day: Daily  Current Treatment Recommendations: Strengthening, Transfer Training, Endurance Training, Gait Training, Functional Mobility Training, Stair training, Pain Management, Safety Education & Training  Safety Devices  Type of devices: Patient at risk for falls, Call light within reach, Left in bed, Nurse notified, Sitter present, Telesitter in use  Restraints  Initially in place: No     Therapy Time   Individual Concurrent Group Co-treatment   Time In  1130         Time Out  1210         Minutes  36                 MILLI SOLANO, PTA

## 2019-08-06 NOTE — PLAN OF CARE
?  No SOB ? SOB on exertion ? SOB min activity ? At rest/acute   e FEV% Predicted       ? NA ? Above 69%  ? Unable ? Above 60-69%  ? Unable ? Above 50-59%  ? Unable ? Above 35-49%  ? Unable ? Less than 35%  ? Unable                 ? Hyperinflation Assessment  Score 1 2 3   CXR and Breath Sounds   ? Clear ? No atelectasis  Basilar aeration ? Atelectasis or absent basilar breath sounds   Incentive Spirometry Volume  (Per IBW)   ? Greater than or equal to 15ml/Kg ? less than 15ml/Kg ? less than 15ml/Kg   Surgery within last 2 weeks ? None or general   ?  Abdominal or thoracic surgery  ? Abdominal or thoracic   Chronic Pulmonary Historyre ? No ?  Yes ? Yes     ? Secretion Management Assessment  Score 1 2 3   Bilateral Breath Sounds   ? Occasional Rhonchi ? Scattered Rhonchi ? Course Rhonchi and/or poor aeration   Sputum    ? Small amount of thin secretions ? Moderate amount of viscous secretions ? Copius, Viscious Yellow/ Secretions   CXR as reported by physician ?  clear  ? Unavailable ? Infiltrates and/or consolidation  ? Unavailable ? Mucus Plugging and or lobar consolidation  ? Unavailable   Cough ? Strong, productive cough ? Weak productive cough ?   No cough or weak non-productive cough   MARA DONOHUE  10:26 AM                            FEMALE                                  MALE                            FEV1 Predicted Normal Values                        FEV1 Predicted Normal Values          Age                                     Height in Feet and Inches       Age                                     Height in Feet and Inches       4' 11\" 5' 1\" 5' 3\" 5' 5\" 5' 7\" 5' 9\" 5' 11\" 6' 1\"  4' 11\" 5' 1\" 5' 3\" 5' 5\" 5' 7\" 5' 9\" 5' 11\" 6' 1\"   42 - 45 2.49 2.66 2.84 3.03 3.22 3.42 3.62 3.83 42 - 45 2.82 3.03 3.26 3.49 3.72 3.96 4.22 4.47   46 - 49 2.40 2.57 2.76 2.94 3.14 3.33 3.54 3.75 46 - 49 2.70 2.92 3.14 3.37 3.61 3.85 4.10 4.36   50 - 53 2.31 2.48 2.66 2.85 3.04 3.24

## 2019-08-06 NOTE — PROGRESS NOTES
Occupational Therapy   Occupational Therapy Initial Assessment  Date: 2019   Patient Name: Cherrie Charles  MRN: [id number]     : 1960    Date of Service: 2019    Discharge Recommendations:   Patient may be able to tolerate x3 hrs of therapy pending patient's progress     OT Equipment Recommendations  Equipment Needed: Yes  Mobility Devices: ADL Assistive Devices  Walker: Rolling  ADL Assistive Devices: Toileting - Raised Toilet Seat with arms    Assessment   Performance deficits / Impairments: Decreased functional mobility ; Decreased safe awareness;Decreased balance;Decreased coordination;Decreased ADL status; Decreased cognition;Decreased endurance;Decreased high-level IADLs;Decreased strength  Assessment: Patient demonstrates decreased cognition, heavily impairing participation in functional activities, ADL and transfer tasks. Patient is limited by decreased safety awareness and adhering to NWB status. Patient expected to need skilled OT services at this time to address functional limitations. Patient is currently unsafe to return home without 24 hr supervision. Prognosis: Fair  Decision Making: Medium Complexity  Patient Education: Patient educated on extensively purpose of OT, importance of therapy participation,  OT POC, safety awareness and maintaining NWB status to LUE - fair return   REQUIRES OT FOLLOW UP: Yes  Activity Tolerance  Activity Tolerance: Treatment limited secondary to decreased cognition  Safety Devices  Safety Devices in place: Yes  Type of devices: All fall risk precautions in place;Nurse notified; Left in chair;Patient at risk for falls           Patient Diagnosis(es): The primary encounter diagnosis was Fall, initial encounter. A diagnosis of Closed fracture of multiple ribs of right side, initial encounter was also pertinent to this visit. has a past medical history of Hemochromatosis and Hypertension.    has a past surgical history that includes thoracotomy (Right, 7/24/2019) and sternum separation repair (Right, 7/24/2019). Treatment Diagnosis: fall       Restrictions  Restrictions/Precautions  Restrictions/Precautions: Weight Bearing  Required Braces or Orthoses?: No  Lower Extremity Weight Bearing Restrictions  Right Lower Extremity Weight Bearing: Weight Bearing As Tolerated  Upper Extremity Weight Bearing Restrictions  Left Upper Extremity Weight Bearing: Non Weight Bearing  Position Activity Restriction  Other position/activity restrictions: WBAT R LE (confirmed 8/5), up with assist. CLTS clear, TLSO brace for comfort     Subjective   General  Chart Reviewed: No  Patient assessed for rehabilitation services?: Yes  Family / Caregiver Present: No  Diagnosis: fall   Subjective  Subjective: co-eval with PT  General Comment  Comments: RN ok'd for therapy this morning.    Patient Currently in Pain: Yes(unable to assess verbally due to cognition)  Pain Assessment  Pain Assessment: Faces  Pain Level: (scheduled)  Montoya-Baker Pain Rating: Hurts little more  Pain Location: Arm  Pain Orientation: Right  Non-Pharmaceutical Pain Intervention(s): Distraction  Response to Pain Intervention: Patient Satisfied    Social/Functional History  Social/Functional History  Lives With: Son  Type of Home: House  Home Layout: Two level, Able to Live on Main level with bedroom/bathroom  Home Access: Stairs to enter without rails  Entrance Stairs - Number of Steps: 1  Bathroom Shower/Tub: Walk-in shower  Bathroom Toilet: Standard  Bathroom Equipment: Shower chair  Bathroom Accessibility: Accessible  Home Equipment: (pt reported no use of DME at baseline )  Receives Help From: Family  ADL Assistance: Independent  Homemaking Assistance: Independent  Homemaking Responsibilities: Yes  Meal Prep Responsibility: Secondary  Cleaning Responsibility: Secondary  Ambulation Assistance: Independent  Transfer Assistance: Independent  Active : Yes  Mode of Transportation: [employer]  Occupation: Retired  Leisure & Hobbies: Sports, time with family, boating, billards  Additional Comments: pt reported family able to assist PRN ( all information obtained from past evaluation )        Objective   Vision: Impaired  Vision Exceptions: Wears glasses for reading  Hearing: Within functional limits    Orientation  Overall Orientation Status: Impaired  Orientation Level: Disoriented to person;Disoriented to place; Disoriented to situation     Balance  Sitting Balance: Supervision(sitting unsupported in chair )  Standing Balance  Comment: trialed, but patient unable to sequence through   ADL  Feeding: Increased time to complete;Minimal assistance  Grooming: Moderate assistance;Setup; Increased time to complete;Verbal cueing  UE Bathing: Verbal cueing; Moderate assistance  LE Bathing: Moderate assistance;Verbal cueing  UE Dressing: Verbal cueing; Moderate assistance  LE Dressing: Minimal assistance;Setup;Verbal cueing(patient able to don sock once removed by OT and placed on his toes, able to complete task with increased time)  Toileting: Moderate assistance  Additional Comments: Pt very confused, but following commands this date. Patient needed heavy verbal cueing to engage in functional activity and extensive education on purpose of OT being present. Tone RUE  RUE Tone: Normotonic  Tone LUE  LUE Tone: Normotonic  Coordination  Movements Are Fluid And Coordinated: Yes     Bed mobility  Comment: sitting in chair upon arrival and retired to chair         Cognition  Overall Cognitive Status: Exceptions  Arousal/Alertness: Inconsistent responses to stimuli;Delayed responses to stimuli  Following Commands: Inconsistently follows commands; Follows one step commands with increased time; Follows one step commands with repetition  Attention Span: Difficulty dividing attention; Difficulty attending to directions  Memory: Decreased recall of recent events;Decreased recall of biographical Information  Safety Judgement: Decreased awareness of need for

## 2019-08-06 NOTE — PROGRESS NOTES
Nutrition Assessment (Enteral Nutrition)    Type and Reason for Visit: Reassess    Nutrition Recommendations: Continue current tube feeding. Will continue to monitor TF tolerance/adequacy- modify TF as needed. Nutrition Assessment: Pt remains NPO at this time. TF continues at goal rate of 55 ml/hr. Pt is tolerating TF ok per RN- no N/V or abd pain, but loose/watery stools continue. Nutrition Risk Level: Moderate    Nutrition Needs:  · Estimated Daily Total Kcal: 2100 kcal/day  · Estimated Daily Protein (g): 115 g pro/day     Nutrition Diagnosis:   · Problem: Inadequate oral intake  · Etiology: related to Difficulty swallowing     Signs and symptoms:  as evidenced by NPO status due to medical condition, Swallow study results, Nutrition support - EN    Objective Information:  · Current Nutrition Therapies:  · Oral Diet Orders: NPO   · Tube Feeding (TF) Orders:   · Feeding Route: Nasogastric  · Formula: Immune Enhancing  · Rate (ml/hr):55 mL/hr    · Volume (ml/day): 1320 mL/day  · Duration: Continuous  · Current TF & Flush Orders Provides: 1980 kcal and 124 g pro/day  · Goal TF & Flush Orders Provides: 1980 kcal and 124 g pro/day   · Additional Calories: none  · Anthropometric Measures:  · Ht: 6' 1\" (185.4 cm)   · Current Body Wt: 170 lb 3.1 oz (77.2 kg)  · Admission Body Wt: 194 lb 0.1 oz (88 kg)  · Ideal Body Wt: 184 lb (83.5 kg), % Ideal Body 92%  · BMI Classification: BMI 25.0 - 29.9 Overweight    Nutrition Interventions:   Continue current Tube Feeding  Continued Inpatient Monitoring, Education Not Indicated    Nutrition Evaluation:   · Evaluation: Progressing toward goals   · Goals: Meet % of estimated nutrient needs.    · Monitoring: TF Intake, TF Tolerance, Diarrhea, Mental Status/Confusion, Weight, Pertinent Labs, Chewing/Swallowing, Nutrition Progression      Electronically signed by Chelsey Guerrero RD, LD on 8/6/19 at 1:35 PM    Contact Number: [phone]

## 2019-08-07 PROCEDURE — 6370000000 HC RX 637 (ALT 250 FOR IP): Performed by: STUDENT IN AN ORGANIZED HEALTH CARE EDUCATION/TRAINING PROGRAM

## 2019-08-07 PROCEDURE — 2580000003 HC RX 258: Performed by: STUDENT IN AN ORGANIZED HEALTH CARE EDUCATION/TRAINING PROGRAM

## 2019-08-07 PROCEDURE — 31720 CLEARANCE OF AIRWAYS: CPT

## 2019-08-07 PROCEDURE — 6360000002 HC RX W HCPCS: Performed by: STUDENT IN AN ORGANIZED HEALTH CARE EDUCATION/TRAINING PROGRAM

## 2019-08-07 PROCEDURE — 6360000002 HC RX W HCPCS: Performed by: EMERGENCY MEDICINE

## 2019-08-07 PROCEDURE — 6370000000 HC RX 637 (ALT 250 FOR IP): Performed by: SURGERY

## 2019-08-07 PROCEDURE — 2500000003 HC RX 250 WO HCPCS: Performed by: STUDENT IN AN ORGANIZED HEALTH CARE EDUCATION/TRAINING PROGRAM

## 2019-08-07 PROCEDURE — 97530 THERAPEUTIC ACTIVITIES: CPT

## 2019-08-07 PROCEDURE — 94640 AIRWAY INHALATION TREATMENT: CPT

## 2019-08-07 PROCEDURE — 97116 GAIT TRAINING THERAPY: CPT

## 2019-08-07 PROCEDURE — 94762 N-INVAS EAR/PLS OXIMTRY CONT: CPT

## 2019-08-07 PROCEDURE — 2060000000 HC ICU INTERMEDIATE R&B

## 2019-08-07 RX ORDER — HALOPERIDOL 5 MG/ML
5 INJECTION INTRAMUSCULAR ONCE
Status: COMPLETED | OUTPATIENT
Start: 2019-08-07 | End: 2019-08-07

## 2019-08-07 RX ORDER — HALOPERIDOL 5 MG/ML
INJECTION INTRAMUSCULAR
Status: DISCONTINUED
Start: 2019-08-07 | End: 2019-08-08

## 2019-08-07 RX ADMIN — FOLIC ACID 1 MG: 1 TABLET ORAL at 11:46

## 2019-08-07 RX ADMIN — ALBUTEROL SULFATE 2.5 MG: 2.5 SOLUTION RESPIRATORY (INHALATION) at 07:41

## 2019-08-07 RX ADMIN — ACETAMINOPHEN 1000 MG: 500 TABLET ORAL at 13:02

## 2019-08-07 RX ADMIN — Medication 400 MG: at 14:40

## 2019-08-07 RX ADMIN — Medication 100 MG: at 21:25

## 2019-08-07 RX ADMIN — ENOXAPARIN SODIUM 30 MG: 100 INJECTION SUBCUTANEOUS at 21:26

## 2019-08-07 RX ADMIN — ACETAMINOPHEN 1000 MG: 500 TABLET ORAL at 02:53

## 2019-08-07 RX ADMIN — QUETIAPINE FUMARATE 200 MG: 100 TABLET ORAL at 21:25

## 2019-08-07 RX ADMIN — POTASSIUM CHLORIDE 20 MEQ: 1.5 POWDER, FOR SOLUTION ORAL at 21:25

## 2019-08-07 RX ADMIN — CEFEPIME 2 G: 2 INJECTION, POWDER, FOR SOLUTION INTRAVENOUS at 13:02

## 2019-08-07 RX ADMIN — TRAZODONE HYDROCHLORIDE 100 MG: 100 TABLET ORAL at 21:25

## 2019-08-07 RX ADMIN — ALBUTEROL SULFATE 2.5 MG: 2.5 SOLUTION RESPIRATORY (INHALATION) at 14:40

## 2019-08-07 RX ADMIN — PROPRANOLOL HYDROCHLORIDE 25 MG: 10 TABLET ORAL at 11:52

## 2019-08-07 RX ADMIN — ANTI-FUNGAL POWDER MICONAZOLE NITRATE TALC FREE: 1.42 POWDER TOPICAL at 21:25

## 2019-08-07 RX ADMIN — ANTI-FUNGAL POWDER MICONAZOLE NITRATE TALC FREE: 1.42 POWDER TOPICAL at 11:57

## 2019-08-07 RX ADMIN — QUETIAPINE FUMARATE 200 MG: 100 TABLET ORAL at 11:46

## 2019-08-07 RX ADMIN — HALOPERIDOL LACTATE 5 MG: 5 INJECTION, SOLUTION INTRAMUSCULAR at 18:43

## 2019-08-07 RX ADMIN — THERA TABS 1 TABLET: TAB at 11:51

## 2019-08-07 RX ADMIN — Medication 2 MG: at 21:25

## 2019-08-07 RX ADMIN — PROPRANOLOL HYDROCHLORIDE 30 MG: 10 TABLET ORAL at 21:24

## 2019-08-07 RX ADMIN — ACETAMINOPHEN 1000 MG: 500 TABLET ORAL at 21:25

## 2019-08-07 RX ADMIN — ENOXAPARIN SODIUM 30 MG: 100 INJECTION SUBCUTANEOUS at 11:52

## 2019-08-07 RX ADMIN — CEFEPIME 2 G: 2 INJECTION, POWDER, FOR SOLUTION INTRAVENOUS at 21:26

## 2019-08-07 RX ADMIN — Medication 100 MG: at 11:46

## 2019-08-07 RX ADMIN — Medication 400 MG: at 07:41

## 2019-08-07 RX ADMIN — POTASSIUM CHLORIDE 20 MEQ: 1.5 POWDER, FOR SOLUTION ORAL at 11:46

## 2019-08-07 RX ADMIN — ALBUTEROL SULFATE 2.5 MG: 2.5 SOLUTION RESPIRATORY (INHALATION) at 20:42

## 2019-08-07 RX ADMIN — Medication 400 MG: at 20:43

## 2019-08-07 RX ADMIN — Medication 10 ML: at 21:42

## 2019-08-07 ASSESSMENT — PAIN SCALES - GENERAL
PAINLEVEL_OUTOF10: 3
PAINLEVEL_OUTOF10: 0
PAINLEVEL_OUTOF10: 5
PAINLEVEL_OUTOF10: 9

## 2019-08-07 NOTE — PLAN OF CARE
Problem: Confusion - Acute:  Goal: Absence of continued neurological deterioration signs and symptoms  Description  Absence of continued neurological deterioration signs and symptoms  8/7/2019 1615 by Yousuf Alvarez RN  Outcome: Ongoing  8/7/2019 0735 by Vernon Seymour RN  Outcome: Ongoing

## 2019-08-07 NOTE — PLAN OF CARE
and symptoms  Description  Absence of psychomotor disturbance signs and symptoms  Outcome: Ongoing     Problem: Sensory Perception - Impaired:  Goal: Demonstrations of improved sensory functioning will increase  Description  Demonstrations of improved sensory functioning will increase  Outcome: Ongoing  Goal: Decrease in sensory misperception frequency  Description  Decrease in sensory misperception frequency  Outcome: Ongoing  Goal: Able to refrain from responding to false sensory perceptions  Description  Able to refrain from responding to false sensory perceptions  Outcome: Ongoing  Goal: Demonstrates accurate environmental perceptions  Description  Demonstrates accurate environmental perceptions  Outcome: Ongoing  Goal: Able to distinguish between reality-based and nonreality-based thinking  Description  Able to distinguish between reality-based and nonreality-based thinking  Outcome: Ongoing  Goal: Able to interrupt nonreality-based thinking  Description  Able to interrupt nonreality-based thinking  Outcome: Ongoing     Problem: Sleep Pattern Disturbance:  Goal: Appears well-rested  Description  Appears well-rested  Outcome: Ongoing     Problem: OXYGENATION/RESPIRATORY FUNCTION  Goal: Patient will maintain patent airway  Outcome: Ongoing  Goal: Patient will achieve/maintain normal respiratory rate/effort  Description  Respiratory rate and effort will be within normal limits for the patient   Outcome: Ongoing     Problem: SKIN INTEGRITY  Goal: Skin integrity is maintained or improved  Outcome: Ongoing     Problem: Nutrition  Goal: Optimal nutrition therapy  Outcome: Ongoing

## 2019-08-07 NOTE — PROGRESS NOTES
[facility]. XXXA] . Vitals:    08/07/19 0830   BP: (!) 145/65   Pulse: 102   Resp: 20   Temp: 98.6 °F (37 °C)   SpO2: 98%   . Patients home meds are   Prior to Admission medications    Medication Sig Start Date End Date Taking? Authorizing Provider   acetaminophen (TYLENOL) 500 MG tablet Take 2 tablets by mouth every 6 hours as needed for Pain 7/16/19 7/23/19 Yes Trinidad Casarez MD   ibuprofen (ADVIL;MOTRIN) 400 MG tablet Take 1 tablet by mouth every 4 hours 7/16/19  Yes Trinidad Casarez MD   gabapentin (NEURONTIN) 300 MG capsule Take 1 capsule by mouth 3 times daily for 7 days.  7/16/19 7/23/19 Yes Trinidad Casarez MD   .  Recent Surgical History: None = 0     Assessment     Peak Flow (asthma only)    Predicted: na  Personal Best: na  PEF na  % Predicted na  Peak Flow : not applicable = 0    MVJ5/APR    FEV1 Predicted na      FEV1 na    FEV1 % Predicted na  FVC na  IS volume na  IBW na    RR 20  Breath Sounds: Diminished / Rhonchi       · Bronchodilator assessment at level    · Hyperinflation assessment at level   · Secretion Management assessment at level   2  ·   · [x]    Bronchodilator Assessment  BRONCHODILATOR ASSESSMENT SCORE  Score 0 1 2 3 4 5   Breath Sounds   []  Patient Baseline []  No Wheeze good aeration [x]  Faint, scattered wheezing, good aeration []  Expiratory Wheezing and or moderately diminished []  Insp/Exp wheeze and/or very diminished []  Insp/Exp and/ or marked distress   Respiratory Rate   []  Patient Baseline []  Less than 20 []  Less than 20 [x]  20-25 []  Greater than 25 []  Greater than 25   Peak flow % of Pred or PB [x]  NA   []  Greater than 90%  []  81-90% []  71-80% []  Less than or equal to 70%  or unable to perform []  Unable due to Respiratory Distress   Dyspnea re []  Patient Baseline []  No SOB [x]  No SOB []  SOB on exertion []  SOB min activity []  At rest/acute   e FEV% Predicted       [x]  NA []  Above 69%  []  Unable

## 2019-08-07 NOTE — PROGRESS NOTES
Peer to Peer scheduled for Thursday, August 8 at 1:30pm with [facility] [place] physician. The assigned Trauma resident will call [phone], opt.1, opt.1, opt. 3 at the appointment time.  Reference No. - [id number]

## 2019-08-08 ENCOUNTER — APPOINTMENT (OUTPATIENT)
Dept: GENERAL RADIOLOGY | Age: 59
DRG: 957 | End: 2019-08-08
Payer: COMMERCIAL

## 2019-08-08 LAB
ABSOLUTE EOS #: 0.3 K/UL (ref 0–0.4)
ABSOLUTE IMMATURE GRANULOCYTE: 0 K/UL (ref 0–0.3)
ABSOLUTE LYMPH #: 2.1 K/UL (ref 1–4.8)
ABSOLUTE MONO #: 1.05 K/UL (ref 0.1–0.8)
ANION GAP SERPL CALCULATED.3IONS-SCNC: 15 MMOL/L (ref 9–17)
BASOPHILS # BLD: 0 % (ref 0–2)
BASOPHILS ABSOLUTE: 0 K/UL (ref 0–0.2)
BUN BLDV-MCNC: 42 MG/DL (ref 6–20)
BUN/CREAT BLD: ABNORMAL (ref 9–20)
CALCIUM SERPL-MCNC: 10.5 MG/DL (ref 8.6–10.4)
CHLORIDE BLD-SCNC: 104 MMOL/L (ref 98–107)
CO2: 23 MMOL/L (ref 20–31)
CREAT SERPL-MCNC: 1.06 MG/DL (ref 0.7–1.2)
DIFFERENTIAL TYPE: ABNORMAL
EOSINOPHILS RELATIVE PERCENT: 2 % (ref 1–4)
GFR AFRICAN AMERICAN: >60 ML/MIN
GFR NON-AFRICAN AMERICAN: >60 ML/MIN
GFR SERPL CREATININE-BSD FRML MDRD: ABNORMAL ML/MIN/{1.73_M2}
GFR SERPL CREATININE-BSD FRML MDRD: ABNORMAL ML/MIN/{1.73_M2}
GLUCOSE BLD-MCNC: 100 MG/DL (ref 70–99)
HCT VFR BLD CALC: 30.3 % (ref 40.7–50.3)
HEMOGLOBIN: 9.1 G/DL (ref 13–17)
IMMATURE GRANULOCYTES: 0 %
LYMPHOCYTES # BLD: 14 % (ref 24–44)
MCH RBC QN AUTO: 29 PG (ref 25.2–33.5)
MCHC RBC AUTO-ENTMCNC: 30 G/DL (ref 28.4–34.8)
MCV RBC AUTO: 96.5 FL (ref 82.6–102.9)
MONOCYTES # BLD: 7 % (ref 1–7)
MORPHOLOGY: NORMAL
NRBC AUTOMATED: 0 PER 100 WBC
PDW BLD-RTO: 13.7 % (ref 11.8–14.4)
PLATELET # BLD: 665 K/UL (ref 138–453)
PLATELET ESTIMATE: ABNORMAL
PMV BLD AUTO: 9.9 FL (ref 8.1–13.5)
POTASSIUM SERPL-SCNC: 4.8 MMOL/L (ref 3.7–5.3)
RBC # BLD: 3.14 M/UL (ref 4.21–5.77)
RBC # BLD: ABNORMAL 10*6/UL
SEG NEUTROPHILS: 77 % (ref 36–66)
SEGMENTED NEUTROPHILS ABSOLUTE COUNT: 11.55 K/UL (ref 1.8–7.7)
SODIUM BLD-SCNC: 142 MMOL/L (ref 135–144)
WBC # BLD: 15 K/UL (ref 3.5–11.3)
WBC # BLD: ABNORMAL 10*3/UL

## 2019-08-08 PROCEDURE — 6360000002 HC RX W HCPCS: Performed by: STUDENT IN AN ORGANIZED HEALTH CARE EDUCATION/TRAINING PROGRAM

## 2019-08-08 PROCEDURE — 6370000000 HC RX 637 (ALT 250 FOR IP): Performed by: STUDENT IN AN ORGANIZED HEALTH CARE EDUCATION/TRAINING PROGRAM

## 2019-08-08 PROCEDURE — 36415 COLL VENOUS BLD VENIPUNCTURE: CPT

## 2019-08-08 PROCEDURE — 73130 X-RAY EXAM OF HAND: CPT

## 2019-08-08 PROCEDURE — 2060000000 HC ICU INTERMEDIATE R&B

## 2019-08-08 PROCEDURE — 6370000000 HC RX 637 (ALT 250 FOR IP): Performed by: SURGERY

## 2019-08-08 PROCEDURE — 2580000003 HC RX 258: Performed by: STUDENT IN AN ORGANIZED HEALTH CARE EDUCATION/TRAINING PROGRAM

## 2019-08-08 PROCEDURE — 94640 AIRWAY INHALATION TREATMENT: CPT

## 2019-08-08 PROCEDURE — 31720 CLEARANCE OF AIRWAYS: CPT

## 2019-08-08 PROCEDURE — 97116 GAIT TRAINING THERAPY: CPT

## 2019-08-08 PROCEDURE — 71045 X-RAY EXAM CHEST 1 VIEW: CPT

## 2019-08-08 PROCEDURE — 97127 HC SP THER IVNTJ W/FOCUS COG FUNCJ: CPT

## 2019-08-08 PROCEDURE — 85025 COMPLETE CBC W/AUTO DIFF WBC: CPT

## 2019-08-08 PROCEDURE — 6370000000 HC RX 637 (ALT 250 FOR IP): Performed by: EMERGENCY MEDICINE

## 2019-08-08 PROCEDURE — 94762 N-INVAS EAR/PLS OXIMTRY CONT: CPT

## 2019-08-08 PROCEDURE — 80048 BASIC METABOLIC PNL TOTAL CA: CPT

## 2019-08-08 PROCEDURE — 97530 THERAPEUTIC ACTIVITIES: CPT

## 2019-08-08 RX ORDER — UREA 10 %
6 LOTION (ML) TOPICAL NIGHTLY
Status: DISCONTINUED | OUTPATIENT
Start: 2019-08-08 | End: 2019-08-15 | Stop reason: HOSPADM

## 2019-08-08 RX ORDER — QUETIAPINE FUMARATE 200 MG/1
200 TABLET, FILM COATED ORAL EVERY MORNING
Status: DISCONTINUED | OUTPATIENT
Start: 2019-08-09 | End: 2019-08-15 | Stop reason: HOSPADM

## 2019-08-08 RX ORDER — QUETIAPINE FUMARATE 300 MG/1
300 TABLET, FILM COATED ORAL NIGHTLY
Status: DISCONTINUED | OUTPATIENT
Start: 2019-08-08 | End: 2019-08-13

## 2019-08-08 RX ADMIN — THERA TABS 1 TABLET: TAB at 10:06

## 2019-08-08 RX ADMIN — ENOXAPARIN SODIUM 30 MG: 100 INJECTION SUBCUTANEOUS at 21:29

## 2019-08-08 RX ADMIN — POTASSIUM CHLORIDE 20 MEQ: 1.5 POWDER, FOR SOLUTION ORAL at 10:06

## 2019-08-08 RX ADMIN — CEFEPIME 2 G: 2 INJECTION, POWDER, FOR SOLUTION INTRAVENOUS at 05:29

## 2019-08-08 RX ADMIN — POTASSIUM CHLORIDE 20 MEQ: 1.5 POWDER, FOR SOLUTION ORAL at 21:28

## 2019-08-08 RX ADMIN — TRAZODONE HYDROCHLORIDE 100 MG: 100 TABLET ORAL at 21:28

## 2019-08-08 RX ADMIN — ALBUTEROL SULFATE 2.5 MG: 2.5 SOLUTION RESPIRATORY (INHALATION) at 19:10

## 2019-08-08 RX ADMIN — ENOXAPARIN SODIUM 30 MG: 100 INJECTION SUBCUTANEOUS at 10:06

## 2019-08-08 RX ADMIN — QUETIAPINE FUMARATE 300 MG: 300 TABLET ORAL at 21:28

## 2019-08-08 RX ADMIN — ACETAMINOPHEN 1000 MG: 500 TABLET ORAL at 18:39

## 2019-08-08 RX ADMIN — ACETAMINOPHEN 1000 MG: 500 TABLET ORAL at 11:58

## 2019-08-08 RX ADMIN — Medication 800 MG: at 07:29

## 2019-08-08 RX ADMIN — Medication 100 MG: at 10:05

## 2019-08-08 RX ADMIN — ALBUTEROL SULFATE 2.5 MG: 2.5 SOLUTION RESPIRATORY (INHALATION) at 07:28

## 2019-08-08 RX ADMIN — CEFEPIME 2 G: 2 INJECTION, POWDER, FOR SOLUTION INTRAVENOUS at 13:35

## 2019-08-08 RX ADMIN — PROPRANOLOL HYDROCHLORIDE 25 MG: 10 TABLET ORAL at 21:27

## 2019-08-08 RX ADMIN — FOLIC ACID 1 MG: 1 TABLET ORAL at 10:06

## 2019-08-08 RX ADMIN — IBUPROFEN 400 MG: 100 SUSPENSION ORAL at 15:37

## 2019-08-08 RX ADMIN — ANTI-FUNGAL POWDER MICONAZOLE NITRATE TALC FREE: 1.42 POWDER TOPICAL at 10:07

## 2019-08-08 RX ADMIN — PROPRANOLOL HYDROCHLORIDE 25 MG: 10 TABLET ORAL at 10:05

## 2019-08-08 RX ADMIN — ACETAMINOPHEN 1000 MG: 500 TABLET ORAL at 03:25

## 2019-08-08 RX ADMIN — ANTI-FUNGAL POWDER MICONAZOLE NITRATE TALC FREE: 1.42 POWDER TOPICAL at 21:42

## 2019-08-08 RX ADMIN — QUETIAPINE FUMARATE 200 MG: 100 TABLET ORAL at 10:05

## 2019-08-08 RX ADMIN — Medication 100 MG: at 14:00

## 2019-08-08 RX ADMIN — Medication 6 MG: at 21:28

## 2019-08-08 RX ADMIN — Medication 100 MG: at 21:28

## 2019-08-08 ASSESSMENT — PAIN SCALES - GENERAL
PAINLEVEL_OUTOF10: 5
PAINLEVEL_OUTOF10: 0
PAINLEVEL_OUTOF10: 3
PAINLEVEL_OUTOF10: 0
PAINLEVEL_OUTOF10: 7
PAINLEVEL_OUTOF10: 2
PAINLEVEL_OUTOF10: 0
PAINLEVEL_OUTOF10: 0

## 2019-08-08 NOTE — PROGRESS NOTES
Physical Therapy  Facility/Department: [facility] STEPDOWN  Daily Treatment Note  NAME: Ana Velazquez  : 1960  MRN: [id number]    Date of Service: 2019    Discharge Recommendations:  Patient would benefit from continued therapy after dischargeAssessment      Activity Tolerance  Activity Tolerance: Patient limited by cognitive status; Patient Tolerated treatment well  Other Comments  Comments: Pt up in chair post tx with chair alarm on and sitter present. Patient Diagnosis(es): The primary encounter diagnosis was Fall, initial encounter. A diagnosis of Closed fracture of multiple ribs of right side, initial encounter was also pertinent to this visit. has a past medical history of Hemochromatosis and Hypertension. has a past surgical history that includes thoracotomy (Right, 2019) and sternum separation repair (Right, 2019). Restrictions  Restrictions/Precautions  Restrictions/Precautions: Weight Bearing  Required Braces or Orthoses?: No  Lower Extremity Weight Bearing Restrictions  Right Lower Extremity Weight Bearing: Weight Bearing As Tolerated  Upper Extremity Weight Bearing Restrictions  Left Upper Extremity Weight Bearing: Non Weight Bearing  Position Activity Restriction  Other position/activity restrictions: WBAT R LE (confirmed ), up with assist. CLTS clear, TLSO brace for comfort   Subjective   General  Response To Previous Treatment: Patient with no complaints from previous session. Family / Caregiver Present: No  Subjective  Subjective: RN and pt agreed to PT, pt awake in bed upon arrival with sitter present   General Comment  Comments: Pt remains confused and impulsive but is easily redirected. Pain Screening  Patient Currently in Pain: Denies  Vital Signs  Patient Currently in Pain: Denies       Orientation  Orientation  Overall Orientation Status: Impaired  Orientation Level: Oriented to person;Disoriented to place; Disoriented to situation  Cognition      Objective Bed mobility  Scooting: Contact guard assistance;Minimal assistance  Transfers  Sit to Stand: Minimal Assistance  Stand to sit: Minimal Assistance  Stand Pivot Transfers: Moderate Assistance  Comment: Pt stood for approx 2' w/modA to prevent impulsive sitting down in chair while RN provided per care. Ambulation  Ambulation?: Yes  Ambulation 1  Surface: level tile  Device: Rolling Walker  Assistance: Moderate assistance  Quality of Gait: mild ataxia, mod A facilitating pt remaining within RW NOLBERTO and negotiating obstacles with AD  Gait Deviations: Decreased step length;Decreased step height  Distance: 30 ft x1     Balance  Posture: Good  Sitting - Static: Fair;+  Sitting - Dynamic: Fair;+  Standing - Static: Fair;-  Standing - Dynamic: Fair;-    Goals  Short term goals  Time Frame for Short term goals: 10 visits  Short term goal 1: transfers with SBA  Short term goal 2: amb 150 ft with a RW x SBA  Short term goal 3: ascend/descend 4 steps with SBA  Short term goal 4:  To be indepedent with bed mobility  Patient Goals   Patient goals : Return home    Plan  Times per week: 1-2x daily  Times per day: Daily  Current Treatment Recommendations: Strengthening, Transfer Training, Endurance Training, Gait Training, Functional Mobility Training, Stair training, Pain Management, Safety Education & Training  Safety Devices  Type of devices: Patient at risk for falls, Call light within reach, Left in bed, Nurse notified, Sitter present, Telesitter in use  Restraints  Initially in place: No     Therapy Time   Individual Concurrent Group Co-treatment   Time In  0900         Time Out  0925         Minutes  25                 MILLI SOLANO, PTA

## 2019-08-08 NOTE — PLAN OF CARE
Full skin assessment completed this shift. No new skin breakdown at this time. Pt repositioned q2h and prn. Pt kept clean and dry. Gel mattress in place on bed, heels floated. Will continue to monitor. Pt's pain assessed frequently with hourly rounding; assessed all pain characteristics including level, type, location, frequency, and onset. Pt medicated by RN per PRN orders. Non-pharmacologic interventions offered to pt as well. Pt states pain is tolerable at this time. Will continue to monitor. Pt assessed as a fall risk this shift. Pt remains free from falls at this time. Fall precautions in place, including falling star sign on door and fall risk band on pt. Floor free from obstacles, and bed is locked and in lowest position. Adequate lighting provided. Call light within reach; pt encouraged to call before getting OOB for any need. Bed alarm activated. One on One guard present. Will continue to monitor needs during hourly rounding.     Electronically signed by Daryl Shaffer RN on 8/8/2019 at 5:53 AM

## 2019-08-09 ENCOUNTER — APPOINTMENT (OUTPATIENT)
Dept: MRI IMAGING | Age: 59
DRG: 957 | End: 2019-08-09
Payer: COMMERCIAL

## 2019-08-09 LAB — GLUCOSE BLD-MCNC: 111 MG/DL (ref 75–110)

## 2019-08-09 PROCEDURE — 6370000000 HC RX 637 (ALT 250 FOR IP): Performed by: STUDENT IN AN ORGANIZED HEALTH CARE EDUCATION/TRAINING PROGRAM

## 2019-08-09 PROCEDURE — 2580000003 HC RX 258: Performed by: STUDENT IN AN ORGANIZED HEALTH CARE EDUCATION/TRAINING PROGRAM

## 2019-08-09 PROCEDURE — 6360000002 HC RX W HCPCS

## 2019-08-09 PROCEDURE — 70551 MRI BRAIN STEM W/O DYE: CPT

## 2019-08-09 PROCEDURE — 2060000000 HC ICU INTERMEDIATE R&B

## 2019-08-09 PROCEDURE — 97530 THERAPEUTIC ACTIVITIES: CPT

## 2019-08-09 PROCEDURE — 82947 ASSAY GLUCOSE BLOOD QUANT: CPT

## 2019-08-09 PROCEDURE — 6360000002 HC RX W HCPCS: Performed by: STUDENT IN AN ORGANIZED HEALTH CARE EDUCATION/TRAINING PROGRAM

## 2019-08-09 PROCEDURE — 6360000002 HC RX W HCPCS: Performed by: EMERGENCY MEDICINE

## 2019-08-09 PROCEDURE — 6370000000 HC RX 637 (ALT 250 FOR IP): Performed by: EMERGENCY MEDICINE

## 2019-08-09 PROCEDURE — 94640 AIRWAY INHALATION TREATMENT: CPT

## 2019-08-09 RX ORDER — HALOPERIDOL 5 MG/ML
5 INJECTION INTRAMUSCULAR ONCE
Status: COMPLETED | OUTPATIENT
Start: 2019-08-09 | End: 2019-08-09

## 2019-08-09 RX ORDER — LORAZEPAM 2 MG/ML
INJECTION INTRAMUSCULAR
Status: COMPLETED
Start: 2019-08-09 | End: 2019-08-09

## 2019-08-09 RX ADMIN — Medication 6 MG: at 20:14

## 2019-08-09 RX ADMIN — ACETAMINOPHEN 1000 MG: 500 TABLET ORAL at 11:49

## 2019-08-09 RX ADMIN — ANTI-FUNGAL POWDER MICONAZOLE NITRATE TALC FREE: 1.42 POWDER TOPICAL at 19:22

## 2019-08-09 RX ADMIN — PROPRANOLOL HYDROCHLORIDE 25 MG: 10 TABLET ORAL at 08:37

## 2019-08-09 RX ADMIN — ANTI-FUNGAL POWDER MICONAZOLE NITRATE TALC FREE: 1.42 POWDER TOPICAL at 08:39

## 2019-08-09 RX ADMIN — ENOXAPARIN SODIUM 30 MG: 100 INJECTION SUBCUTANEOUS at 08:37

## 2019-08-09 RX ADMIN — POTASSIUM CHLORIDE 20 MEQ: 1.5 POWDER, FOR SOLUTION ORAL at 20:15

## 2019-08-09 RX ADMIN — Medication 100 MG: at 08:37

## 2019-08-09 RX ADMIN — ENOXAPARIN SODIUM 30 MG: 100 INJECTION SUBCUTANEOUS at 20:14

## 2019-08-09 RX ADMIN — LORAZEPAM 2 MG: 2 INJECTION INTRAMUSCULAR at 16:24

## 2019-08-09 RX ADMIN — HALOPERIDOL LACTATE 5 MG: 5 INJECTION, SOLUTION INTRAMUSCULAR at 13:58

## 2019-08-09 RX ADMIN — ACETAMINOPHEN 1000 MG: 500 TABLET ORAL at 20:14

## 2019-08-09 RX ADMIN — QUETIAPINE FUMARATE 300 MG: 300 TABLET ORAL at 20:14

## 2019-08-09 RX ADMIN — Medication 100 MG: at 20:14

## 2019-08-09 RX ADMIN — Medication 400 MG: at 20:02

## 2019-08-09 RX ADMIN — ALBUTEROL SULFATE 2.5 MG: 2.5 SOLUTION RESPIRATORY (INHALATION) at 20:02

## 2019-08-09 RX ADMIN — Medication 10 ML: at 19:21

## 2019-08-09 RX ADMIN — Medication 100 MG: at 13:59

## 2019-08-09 RX ADMIN — TRAZODONE HYDROCHLORIDE 100 MG: 100 TABLET ORAL at 20:14

## 2019-08-09 RX ADMIN — Medication 10 ML: at 08:38

## 2019-08-09 RX ADMIN — THERA TABS 1 TABLET: TAB at 08:37

## 2019-08-09 RX ADMIN — FOLIC ACID 1 MG: 1 TABLET ORAL at 08:37

## 2019-08-09 RX ADMIN — ACETAMINOPHEN 1000 MG: 500 TABLET ORAL at 04:00

## 2019-08-09 RX ADMIN — QUETIAPINE FUMARATE 200 MG: 200 TABLET ORAL at 08:38

## 2019-08-09 RX ADMIN — POTASSIUM CHLORIDE 20 MEQ: 1.5 POWDER, FOR SOLUTION ORAL at 08:37

## 2019-08-09 RX ADMIN — PROPRANOLOL HYDROCHLORIDE 25 MG: 10 TABLET ORAL at 20:15

## 2019-08-09 ASSESSMENT — PAIN SCALES - GENERAL
PAINLEVEL_OUTOF10: 0

## 2019-08-09 NOTE — PROGRESS NOTES
STEPDOWN PROGRESS NOTE        PATIENT NAME: Carolina Martinez  MEDICAL RECORD NO. [id number]  DATE: 2019    PRIMARY CARE PHYSICIAN: Javon Lyons MD    HD: # 25    ASSESSMENT    Patient Active Problem List   Diagnosis    Fall    Closed fracture of multiple ribs of right side    Contusion of right lung    Fracture of transverse process of thoracic vertebra ([address]    Hemothorax on right    Pneumothorax    Closed fracture of right iliac wing (HCC)    Hypokalemia    Fx dorsal vertebra-closed ([address]    Traumatic closed fracture of distal clavicle with minimal displacement, left, initial encounter       MEDICAL DECISION MAKING AND PLAN    Overnight Events: Slept well per pt and nursing report. Sitter reports pt would only sleep for 20 minutes at a time but did sleep most of the night. He was undisturbed by staff. Sleep hygeine continues to improve; blinds open and lights on during the day; darkness and white noise (fan) at night. Pt is pleasant but disoriented, and mood remains labile: when asked to participate in CAM-ICU RASS exam, he refused and became irritable. Neuro:  1. A&Ox1 as on previous days  2. Will discuss MRI today for further evaluation of delirium vs. Intracranial pathology  3. Able to clear own airway today when instructed; did not require deep suction yesterday  4. Continues to be easily redirectable though he is intermittently uncooperative with exam  5. PADS Therapy:  a. Multimodal Pain Control: Tylenol, lidoderm, motrin PRN  b. Agitation: Seroquel 200mg qAM and 300mg qhs, trazodone 100mg qhs  c. Delirium and Sedation: Melatonin      Cardiovascular:  1. Well-perfused with no MRG and normal heart sounds. 2. No peripheral edema  3. Vital Signs  a. Heart Rate: Min 80 - Max 98 (Av)  b. Blood Pressure: Min 117/69 - Max 142/99 (Avg: approximately 120/80)  c. MAP: Min 84 - Max 105 (Av)    Heme:  1. H&H: Pending (9.1)  2. Products since admission: 3u PRBC    Pulmonology:  1.  Breathing

## 2019-08-09 NOTE — PROGRESS NOTES
08/08/19 2112   Cough/Sputum   Sputum How Obtained Nasal tracheal;Suctioned   $Obtained Sample $Nasotracheal Suction   Cough Congested;Moist;Productive   Frequency Occasional   Sputum Amount Moderate   Sputum Color Tan;Yellow;Cloudy   Tenacity Thick

## 2019-08-10 ENCOUNTER — APPOINTMENT (OUTPATIENT)
Dept: GENERAL RADIOLOGY | Age: 59
DRG: 957 | End: 2019-08-10
Payer: COMMERCIAL

## 2019-08-10 LAB
ABSOLUTE EOS #: 1.01 K/UL (ref 0–0.4)
ABSOLUTE IMMATURE GRANULOCYTE: 0 K/UL (ref 0–0.3)
ABSOLUTE LYMPH #: 2.52 K/UL (ref 1–4.8)
ABSOLUTE MONO #: 1.34 K/UL (ref 0.1–0.8)
BASOPHILS # BLD: 0 % (ref 0–2)
BASOPHILS ABSOLUTE: 0 K/UL (ref 0–0.2)
DIFFERENTIAL TYPE: ABNORMAL
EOSINOPHILS RELATIVE PERCENT: 6 % (ref 1–4)
GLUCOSE BLD-MCNC: 115 MG/DL (ref 75–110)
GLUCOSE BLD-MCNC: 98 MG/DL (ref 75–110)
HCT VFR BLD CALC: 33.7 % (ref 40.7–50.3)
HEMOGLOBIN: 9.7 G/DL (ref 13–17)
IMMATURE GRANULOCYTES: 0 %
LYMPHOCYTES # BLD: 15 % (ref 24–44)
MCH RBC QN AUTO: 28.4 PG (ref 25.2–33.5)
MCHC RBC AUTO-ENTMCNC: 28.8 G/DL (ref 28.4–34.8)
MCV RBC AUTO: 98.8 FL (ref 82.6–102.9)
MONOCYTES # BLD: 8 % (ref 1–7)
MORPHOLOGY: NORMAL
NRBC AUTOMATED: 0 PER 100 WBC
PDW BLD-RTO: 13.9 % (ref 11.8–14.4)
PLATELET # BLD: 596 K/UL (ref 138–453)
PLATELET ESTIMATE: ABNORMAL
PMV BLD AUTO: 9.5 FL (ref 8.1–13.5)
RBC # BLD: 3.41 M/UL (ref 4.21–5.77)
RBC # BLD: ABNORMAL 10*6/UL
SEG NEUTROPHILS: 71 % (ref 36–66)
SEGMENTED NEUTROPHILS ABSOLUTE COUNT: 11.93 K/UL (ref 1.8–7.7)
WBC # BLD: 16.8 K/UL (ref 3.5–11.3)
WBC # BLD: ABNORMAL 10*3/UL

## 2019-08-10 PROCEDURE — 36415 COLL VENOUS BLD VENIPUNCTURE: CPT

## 2019-08-10 PROCEDURE — 94761 N-INVAS EAR/PLS OXIMETRY MLT: CPT

## 2019-08-10 PROCEDURE — 2060000000 HC ICU INTERMEDIATE R&B

## 2019-08-10 PROCEDURE — 6370000000 HC RX 637 (ALT 250 FOR IP): Performed by: STUDENT IN AN ORGANIZED HEALTH CARE EDUCATION/TRAINING PROGRAM

## 2019-08-10 PROCEDURE — 6360000002 HC RX W HCPCS: Performed by: STUDENT IN AN ORGANIZED HEALTH CARE EDUCATION/TRAINING PROGRAM

## 2019-08-10 PROCEDURE — 82947 ASSAY GLUCOSE BLOOD QUANT: CPT

## 2019-08-10 PROCEDURE — 73000 X-RAY EXAM OF COLLAR BONE: CPT

## 2019-08-10 PROCEDURE — 2580000003 HC RX 258: Performed by: STUDENT IN AN ORGANIZED HEALTH CARE EDUCATION/TRAINING PROGRAM

## 2019-08-10 PROCEDURE — 6370000000 HC RX 637 (ALT 250 FOR IP): Performed by: EMERGENCY MEDICINE

## 2019-08-10 PROCEDURE — 85025 COMPLETE CBC W/AUTO DIFF WBC: CPT

## 2019-08-10 PROCEDURE — 94640 AIRWAY INHALATION TREATMENT: CPT

## 2019-08-10 RX ADMIN — ENOXAPARIN SODIUM 30 MG: 100 INJECTION SUBCUTANEOUS at 20:09

## 2019-08-10 RX ADMIN — ACETAMINOPHEN 1000 MG: 500 TABLET ORAL at 03:32

## 2019-08-10 RX ADMIN — THERA TABS 1 TABLET: TAB at 08:39

## 2019-08-10 RX ADMIN — Medication 10 ML: at 19:54

## 2019-08-10 RX ADMIN — Medication 100 MG: at 14:33

## 2019-08-10 RX ADMIN — Medication 6 MG: at 19:53

## 2019-08-10 RX ADMIN — Medication 400 MG: at 07:41

## 2019-08-10 RX ADMIN — Medication 100 MG: at 19:57

## 2019-08-10 RX ADMIN — Medication 400 MG: at 14:23

## 2019-08-10 RX ADMIN — ENOXAPARIN SODIUM 30 MG: 100 INJECTION SUBCUTANEOUS at 08:32

## 2019-08-10 RX ADMIN — QUETIAPINE FUMARATE 200 MG: 200 TABLET ORAL at 08:37

## 2019-08-10 RX ADMIN — Medication 400 MG: at 19:41

## 2019-08-10 RX ADMIN — QUETIAPINE FUMARATE 300 MG: 300 TABLET ORAL at 19:57

## 2019-08-10 RX ADMIN — Medication 10 ML: at 08:42

## 2019-08-10 RX ADMIN — POTASSIUM CHLORIDE 20 MEQ: 1.5 POWDER, FOR SOLUTION ORAL at 08:39

## 2019-08-10 RX ADMIN — PROPRANOLOL HYDROCHLORIDE 25 MG: 10 TABLET ORAL at 19:56

## 2019-08-10 RX ADMIN — ALBUTEROL SULFATE 2.5 MG: 2.5 SOLUTION RESPIRATORY (INHALATION) at 19:41

## 2019-08-10 RX ADMIN — POTASSIUM CHLORIDE 20 MEQ: 1.5 POWDER, FOR SOLUTION ORAL at 19:53

## 2019-08-10 RX ADMIN — ANTI-FUNGAL POWDER MICONAZOLE NITRATE TALC FREE: 1.42 POWDER TOPICAL at 08:43

## 2019-08-10 RX ADMIN — PROPRANOLOL HYDROCHLORIDE 25 MG: 10 TABLET ORAL at 08:38

## 2019-08-10 RX ADMIN — ALBUTEROL SULFATE 2.5 MG: 2.5 SOLUTION RESPIRATORY (INHALATION) at 14:23

## 2019-08-10 RX ADMIN — ACETAMINOPHEN 1000 MG: 500 TABLET ORAL at 19:53

## 2019-08-10 RX ADMIN — ANTI-FUNGAL POWDER MICONAZOLE NITRATE TALC FREE: 1.42 POWDER TOPICAL at 19:56

## 2019-08-10 RX ADMIN — TRAZODONE HYDROCHLORIDE 100 MG: 100 TABLET ORAL at 19:53

## 2019-08-10 RX ADMIN — ALBUTEROL SULFATE 2.5 MG: 2.5 SOLUTION RESPIRATORY (INHALATION) at 07:44

## 2019-08-10 RX ADMIN — Medication 100 MG: at 08:37

## 2019-08-10 RX ADMIN — FOLIC ACID 1 MG: 1 TABLET ORAL at 08:39

## 2019-08-10 RX ADMIN — ACETAMINOPHEN 1000 MG: 500 TABLET ORAL at 13:04

## 2019-08-10 ASSESSMENT — PAIN SCALES - GENERAL
PAINLEVEL_OUTOF10: 0
PAINLEVEL_OUTOF10: 0
PAINLEVEL_OUTOF10: 4
PAINLEVEL_OUTOF10: 2
PAINLEVEL_OUTOF10: 0

## 2019-08-10 NOTE — PROGRESS NOTES
Writer completed bedside swallow study per orders. Patient was able to tolerate ice, water and applesauce. Provider updated and NG tube to be  removed.

## 2019-08-10 NOTE — PROGRESS NOTES
LAB:  CBC:   Recent Labs     08/08/19  0546 08/10/19  0545   WBC 15.0* 16.8*   HGB 9.1* 9.7*   HCT 30.3* 33.7*   MCV 96.5 98.8   * 596*     BMP:   Recent Labs     08/08/19  0737      K 4.8      CO2 23   BUN 42*   CREATININE 1.06   GLUCOSE 100*         RADIOLOGY:  CXR: Xr Hand Right (min 3 Views)    Result Date: 8/8/2019  EXAMINATION: THREE XRAY VIEWS OF THE RIGHT HAND 8/8/2019 6:01 pm COMPARISON: None. HISTORY: ORDERING SYSTEM PROVIDED HISTORY: Pt thrashed around yesterday and possibly injured R hand, bruise over R dorsolateral hand that is TTP proximal to second knuckle TECHNOLOGIST PROVIDED HISTORY: Pt thrashed around yesterday and possibly injured R hand, bruise over R dorsolateral hand that is TTP proximal to second knuckle FINDINGS: There is no evidence of acute fracture. There is normal alignment. No acute joint abnormality. No focal osseous lesion. No focal soft tissue abnormality. No acute osseous abnormality. Xr Chest Portable    Result Date: 8/8/2019  EXAMINATION: ONE XRAY VIEW OF THE CHEST 8/8/2019 11:07 am COMPARISON: August 6, 2019 HISTORY: ORDERING SYSTEM PROVIDED HISTORY: compare atelectasis from 8/6. TECHNOLOGIST PROVIDED HISTORY: compare atelectasis from 8/6. Reason for Exam: compare atelectasis Acuity: Unknown Type of Exam: Unknown FINDINGS: Enteric tube extends beyond the gastroesophageal junction. Left mid lung calcified granuloma. Left lung is clear. Postsurgical changes are again seen within the right lung with partial clearing at the right lung base. Small residual pleural effusion. Partial clearing at the right lung base. Bernardo Ontiveros MD  8/9/19, 7:49 AM                     Trauma Attending Jones Reina      I have reviewed the above [facility] note(s) and confirmed the key elements of the medical history and physical exam. I have seen and examined the pt.   I have discussed the findings, established the care plan and recommendations with Resident, [facility]

## 2019-08-11 LAB
ABSOLUTE EOS #: 0.81 K/UL (ref 0–0.44)
ABSOLUTE IMMATURE GRANULOCYTE: 0.21 K/UL (ref 0–0.3)
ABSOLUTE LYMPH #: 2.34 K/UL (ref 1.1–3.7)
ABSOLUTE MONO #: 1.43 K/UL (ref 0.1–1.2)
BASOPHILS # BLD: 1 % (ref 0–2)
BASOPHILS ABSOLUTE: 0.11 K/UL (ref 0–0.2)
DIFFERENTIAL TYPE: ABNORMAL
EOSINOPHILS RELATIVE PERCENT: 7 % (ref 1–4)
HCT VFR BLD CALC: 28.6 % (ref 40.7–50.3)
HEMOGLOBIN: 8.7 G/DL (ref 13–17)
IMMATURE GRANULOCYTES: 2 %
LYMPHOCYTES # BLD: 19 % (ref 24–43)
MCH RBC QN AUTO: 28.7 PG (ref 25.2–33.5)
MCHC RBC AUTO-ENTMCNC: 30.4 G/DL (ref 28.4–34.8)
MCV RBC AUTO: 94.4 FL (ref 82.6–102.9)
MONOCYTES # BLD: 12 % (ref 3–12)
NRBC AUTOMATED: 0 PER 100 WBC
PDW BLD-RTO: 13.9 % (ref 11.8–14.4)
PLATELET # BLD: 489 K/UL (ref 138–453)
PLATELET ESTIMATE: ABNORMAL
PMV BLD AUTO: 9.6 FL (ref 8.1–13.5)
RBC # BLD: 3.03 M/UL (ref 4.21–5.77)
RBC # BLD: ABNORMAL 10*6/UL
SEG NEUTROPHILS: 59 % (ref 36–65)
SEGMENTED NEUTROPHILS ABSOLUTE COUNT: 7.19 K/UL (ref 1.5–8.1)
WBC # BLD: 12.1 K/UL (ref 3.5–11.3)
WBC # BLD: ABNORMAL 10*3/UL

## 2019-08-11 PROCEDURE — 94761 N-INVAS EAR/PLS OXIMETRY MLT: CPT

## 2019-08-11 PROCEDURE — 6370000000 HC RX 637 (ALT 250 FOR IP): Performed by: STUDENT IN AN ORGANIZED HEALTH CARE EDUCATION/TRAINING PROGRAM

## 2019-08-11 PROCEDURE — 36415 COLL VENOUS BLD VENIPUNCTURE: CPT

## 2019-08-11 PROCEDURE — 2580000003 HC RX 258: Performed by: STUDENT IN AN ORGANIZED HEALTH CARE EDUCATION/TRAINING PROGRAM

## 2019-08-11 PROCEDURE — 94640 AIRWAY INHALATION TREATMENT: CPT

## 2019-08-11 PROCEDURE — 6370000000 HC RX 637 (ALT 250 FOR IP): Performed by: EMERGENCY MEDICINE

## 2019-08-11 PROCEDURE — 6360000002 HC RX W HCPCS: Performed by: STUDENT IN AN ORGANIZED HEALTH CARE EDUCATION/TRAINING PROGRAM

## 2019-08-11 PROCEDURE — 2060000000 HC ICU INTERMEDIATE R&B

## 2019-08-11 PROCEDURE — 85025 COMPLETE CBC W/AUTO DIFF WBC: CPT

## 2019-08-11 RX ADMIN — ENOXAPARIN SODIUM 30 MG: 100 INJECTION SUBCUTANEOUS at 08:17

## 2019-08-11 RX ADMIN — Medication 400 MG: at 13:44

## 2019-08-11 RX ADMIN — ALBUTEROL SULFATE 2.5 MG: 2.5 SOLUTION RESPIRATORY (INHALATION) at 19:33

## 2019-08-11 RX ADMIN — Medication 6 MG: at 20:53

## 2019-08-11 RX ADMIN — ACETAMINOPHEN 1000 MG: 500 TABLET ORAL at 03:21

## 2019-08-11 RX ADMIN — ANTI-FUNGAL POWDER MICONAZOLE NITRATE TALC FREE: 1.42 POWDER TOPICAL at 08:20

## 2019-08-11 RX ADMIN — Medication 10 ML: at 20:54

## 2019-08-11 RX ADMIN — POTASSIUM CHLORIDE 20 MEQ: 1.5 POWDER, FOR SOLUTION ORAL at 20:54

## 2019-08-11 RX ADMIN — Medication 400 MG: at 07:29

## 2019-08-11 RX ADMIN — ALBUTEROL SULFATE 2.5 MG: 2.5 SOLUTION RESPIRATORY (INHALATION) at 13:45

## 2019-08-11 RX ADMIN — FOLIC ACID 1 MG: 1 TABLET ORAL at 08:20

## 2019-08-11 RX ADMIN — Medication 400 MG: at 19:33

## 2019-08-11 RX ADMIN — Medication 100 MG: at 08:18

## 2019-08-11 RX ADMIN — ENOXAPARIN SODIUM 30 MG: 100 INJECTION SUBCUTANEOUS at 20:54

## 2019-08-11 RX ADMIN — THERA TABS 1 TABLET: TAB at 08:17

## 2019-08-11 RX ADMIN — ALBUTEROL SULFATE 2.5 MG: 2.5 SOLUTION RESPIRATORY (INHALATION) at 07:29

## 2019-08-11 RX ADMIN — ANTI-FUNGAL POWDER MICONAZOLE NITRATE TALC FREE: 1.42 POWDER TOPICAL at 20:54

## 2019-08-11 RX ADMIN — Medication 10 ML: at 08:20

## 2019-08-11 RX ADMIN — POTASSIUM CHLORIDE 20 MEQ: 1.5 POWDER, FOR SOLUTION ORAL at 08:17

## 2019-08-11 RX ADMIN — ACETAMINOPHEN 1000 MG: 500 TABLET ORAL at 20:53

## 2019-08-11 RX ADMIN — Medication 100 MG: at 20:54

## 2019-08-11 RX ADMIN — PROPRANOLOL HYDROCHLORIDE 25 MG: 10 TABLET ORAL at 20:54

## 2019-08-11 RX ADMIN — PROPRANOLOL HYDROCHLORIDE 25 MG: 10 TABLET ORAL at 08:17

## 2019-08-11 RX ADMIN — QUETIAPINE FUMARATE 200 MG: 200 TABLET ORAL at 08:19

## 2019-08-11 RX ADMIN — TRAZODONE HYDROCHLORIDE 100 MG: 100 TABLET ORAL at 20:54

## 2019-08-11 ASSESSMENT — PAIN SCALES - GENERAL
PAINLEVEL_OUTOF10: 1
PAINLEVEL_OUTOF10: 0
PAINLEVEL_OUTOF10: 0

## 2019-08-11 NOTE — PROGRESS NOTES
resident, TECSS nurse and bedside nurse. The following confirms and/or amends the IMPRESSION, MEDICAL DECISION MAKING and PLAN from above.     ASSESSMENT    Patient Active Problem List   Diagnosis    Fall    Closed fracture of multiple ribs of right side    Contusion of right lung    Fracture of transverse process of thoracic vertebra (HCC)    Hemothorax on right    Pneumothorax    Closed fracture of right iliac wing (HCC)    Hypokalemia    Fx dorsal vertebra-closed ([address]    Traumatic closed fracture of distal clavicle with minimal displacement, left, initial encounter       [facility] Luci Burrell MD  8/11/2019  8:44 AM

## 2019-08-11 NOTE — PLAN OF CARE
Problem: Falls - Risk of:  Goal: Will remain free from falls  Description  Will remain free from falls   Outcome: Met This Shift   Pt remains free of falls at this time. Bed locked in lowest position, siderails up, call light in reach. Encouraged pt to call for assistance as needed for safety. Falling star posted outside of room. Will continue to monitor.

## 2019-08-11 NOTE — PROGRESS NOTES
After breathing treatments,. worked with pt using acapella and IS, pt needs some encouragement, and gives good effort. Strong congested cough after acapella. IS, pt. needs to remember to takes slow deeper breaths.

## 2019-08-12 ENCOUNTER — APPOINTMENT (OUTPATIENT)
Dept: CT IMAGING | Age: 59
DRG: 957 | End: 2019-08-12
Payer: COMMERCIAL

## 2019-08-12 PROCEDURE — 1200000000 HC SEMI PRIVATE

## 2019-08-12 PROCEDURE — 6370000000 HC RX 637 (ALT 250 FOR IP): Performed by: STUDENT IN AN ORGANIZED HEALTH CARE EDUCATION/TRAINING PROGRAM

## 2019-08-12 PROCEDURE — 97530 THERAPEUTIC ACTIVITIES: CPT

## 2019-08-12 PROCEDURE — 72125 CT NECK SPINE W/O DYE: CPT

## 2019-08-12 PROCEDURE — 97535 SELF CARE MNGMENT TRAINING: CPT

## 2019-08-12 PROCEDURE — 6360000002 HC RX W HCPCS: Performed by: STUDENT IN AN ORGANIZED HEALTH CARE EDUCATION/TRAINING PROGRAM

## 2019-08-12 PROCEDURE — 94640 AIRWAY INHALATION TREATMENT: CPT

## 2019-08-12 PROCEDURE — 70450 CT HEAD/BRAIN W/O DYE: CPT

## 2019-08-12 PROCEDURE — 2580000003 HC RX 258: Performed by: STUDENT IN AN ORGANIZED HEALTH CARE EDUCATION/TRAINING PROGRAM

## 2019-08-12 RX ORDER — HALOPERIDOL 5 MG/ML
5 INJECTION INTRAMUSCULAR ONCE
Status: COMPLETED | OUTPATIENT
Start: 2019-08-12 | End: 2019-08-12

## 2019-08-12 RX ADMIN — HALOPERIDOL LACTATE 5 MG: 5 INJECTION INTRAMUSCULAR at 05:18

## 2019-08-12 RX ADMIN — ALBUTEROL SULFATE 2.5 MG: 2.5 SOLUTION RESPIRATORY (INHALATION) at 19:28

## 2019-08-12 RX ADMIN — Medication 400 MG: at 15:09

## 2019-08-12 RX ADMIN — POTASSIUM CHLORIDE 20 MEQ: 1.5 POWDER, FOR SOLUTION ORAL at 08:28

## 2019-08-12 RX ADMIN — QUETIAPINE FUMARATE 200 MG: 200 TABLET ORAL at 08:28

## 2019-08-12 RX ADMIN — Medication 10 ML: at 21:15

## 2019-08-12 RX ADMIN — Medication 400 MG: at 19:36

## 2019-08-12 RX ADMIN — TRAZODONE HYDROCHLORIDE 100 MG: 100 TABLET ORAL at 21:11

## 2019-08-12 RX ADMIN — QUETIAPINE FUMARATE 300 MG: 300 TABLET ORAL at 21:06

## 2019-08-12 RX ADMIN — PROPRANOLOL HYDROCHLORIDE 25 MG: 10 TABLET ORAL at 21:08

## 2019-08-12 RX ADMIN — THERA TABS 1 TABLET: TAB at 08:28

## 2019-08-12 RX ADMIN — Medication 100 MG: at 08:28

## 2019-08-12 RX ADMIN — QUETIAPINE FUMARATE 300 MG: 300 TABLET ORAL at 02:50

## 2019-08-12 RX ADMIN — Medication 100 MG: at 00:00

## 2019-08-12 RX ADMIN — Medication 100 MG: at 14:25

## 2019-08-12 RX ADMIN — HALOPERIDOL LACTATE 5 MG: 5 INJECTION INTRAMUSCULAR at 02:27

## 2019-08-12 RX ADMIN — ALBUTEROL SULFATE 2.5 MG: 2.5 SOLUTION RESPIRATORY (INHALATION) at 15:10

## 2019-08-12 ASSESSMENT — PAIN SCALES - GENERAL
PAINLEVEL_OUTOF10: 0
PAINLEVEL_OUTOF10: 0

## 2019-08-12 NOTE — PROGRESS NOTES
Notified of patient fall. He was walking back from the bathroom and lost his balance. He fell backwards and struck his head on the floor. Patient did not lose consciousness. Vitals reviewed. Speech is clear. He is still impulsive and answers most questions appropriately, which is the same as this am.   CN II-XII intact. Strength and motor to b/l upper and lower extremities 5/5. No step-offs or deformities to spine. Patient is unable to appropriately follow commands to complete a full cervical exam (unable to sidebend)  Respirations even and unlabored  abd is soft, non-distended, non-tender  Extremities are without edema or cyanosis    - TLS cleared. Unable to clear c-spine given inability to completely follow commands.  Place c-collar  - CT head and CT c-spine stat  - ok to sit up in bed, but will need to stay in bed until until imaging completed  - neurochecks     Electronically signed by Ayah Toro DO on 8/12/2019 at 11:33 AM

## 2019-08-12 NOTE — PROGRESS NOTES
Occupational Therapy  Facility/Department: [facility] 4A STEPDOWN  Daily Treatment Note  NAME: Marcie Castañeda  : 1960  MRN: [id number]    Date of Service: 2019    Discharge Recommendations:       Further therapy recommended at discharge. Assessment   Performance deficits / Impairments: Decreased functional mobility ; Decreased safe awareness;Decreased balance;Decreased coordination;Decreased ADL status; Decreased cognition;Decreased endurance;Decreased high-level IADLs;Decreased strength  Assessment: Patient demonstrates decreased cognition, heavily impairing participation in functional activities, ADL and transfer tasks. Patient is limited by decreased safety awareness and adhering to NWB status. Patient expected to need skilled OT services at this time to address functional limitations. Patient is currently unsafe to return home without 24 hr supervision. Treatment Diagnosis: fall   Prognosis: Fair  Patient Education: Patient educated on purpose of OT, importance of therapy participation,  OT POC, safety awareness and maintaining NWB status to LUE - fair return   REQUIRES OT FOLLOW UP: Yes  Activity Tolerance  Activity Tolerance: Treatment limited secondary to decreased cognition  Safety Devices  Safety Devices in place: Yes  Type of devices: All fall risk precautions in place;Nurse notified; Left in chair;Patient at risk for falls  Restraints  Initially in place: No         Patient Diagnosis(es): The primary encounter diagnosis was Fall, initial encounter. A diagnosis of Closed fracture of multiple ribs of right side, initial encounter was also pertinent to this visit. has a past medical history of Hemochromatosis and Hypertension. has a past surgical history that includes thoracotomy (Right, 2019) and sternum separation repair (Right, 2019).     Restrictions  Restrictions/Precautions  Restrictions/Precautions: Weight Bearing  Required Braces or Orthoses?: No  Lower Extremity Weight Bearing with increased time; Follows one step commands with repetition  Attention Span: Difficulty dividing attention; Difficulty attending to directions  Safety Judgement: Decreased awareness of need for assistance;Decreased awareness of need for safety  Problem Solving: Decreased awareness of errors;Assistance required to implement solutions;Assistance required to generate solutions;Assistance required to identify errors made  Insights: Not aware of deficits  Initiation: Requires cues for all  Sequencing: Requires cues for some      Plan   Plan  Times per week: 3-4x/wk  Current Treatment Recommendations: Strengthening, Endurance Training, Patient/Caregiver Education & Training, Self-Care / ADL, Cognitive Reorientation, Home Management Training, Safety Education & Training, Functional Mobility Training    Goals  Short term goals  Time Frame for Short term goals: pt will, by discharge  Short term goal 1: Follow 3 step command to complete grooming tasks  Short term goal 2: demonstrate grooming tasks at [employer] term goal 3: demonstrate LB dressing at [facility] term goal 4: demonstrate stand pivot for use of BSC at [address] [facility] term goal 5: demonstrate tolieting hygiene at [address] [facility] term goal 6: demonstrate UB self-cares at [address] [facility] Time   Individual Concurrent Group Co-treatment   Time In 0850         Time Out 0914         Minutes 24             RN OK'ed tx and pt agreeable. Seated in recliner upon writer arrival. See above for LOF for all tasks. Retired seated in [address] [facility], call light within reach, sitter present, RN notified, and PT in room upon writer exit.      GENARO David/NISHANT

## 2019-08-12 NOTE — PROGRESS NOTES
[facility] Gordon[facility] [W19. XXXA] . Vitals:    08/12/19 1115   BP: 124/79   Pulse: 92   Resp: 21   Temp: 98.9 °F (37.2 °C)   SpO2:    . Patients home meds are   Prior to Admission medications    Medication Sig Start Date End Date Taking? Authorizing Provider   acetaminophen (TYLENOL) 500 MG tablet Take 2 tablets by mouth every 6 hours as needed for Pain 7/16/19 7/23/19 Yes Kimberly Reich MD   ibuprofen (ADVIL;MOTRIN) 400 MG tablet Take 1 tablet by mouth every 4 hours 7/16/19  Yes Kimberly Reich MD   gabapentin (NEURONTIN) 300 MG capsule Take 1 capsule by mouth 3 times daily for 7 days.  7/16/19 7/23/19 Yes Kimberly Reich MD   .    RR 20  Breath Sounds: clear      · Bronchodilator assessment at level  2  · Hyperinflation assessment at level   · Secretion Management assessment at level    ·   · []    Bronchodilator Assessment  BRONCHODILATOR ASSESSMENT SCORE  Score 0 1 2 3 4 5   Breath Sounds   []  Patient Baseline [x]  No Wheeze good aeration []  Faint, scattered wheezing, good aeration []  Expiratory Wheezing and or moderately diminished []  Insp/Exp wheeze and/or very diminished []  Insp/Exp and/ or marked distress   Respiratory Rate   []  Patient Baseline []  Less than 20 []  Less than 20 [x]  20-25 []  Greater than 25 []  Greater than 25   Peak flow % of Pred or PB [x]  NA   []  Greater than 90%  []  81-90% []  71-80% []  Less than or equal to 70%  or unable to perform []  Unable due to Respiratory Distress   Dyspnea re []  Patient Baseline [x]  No SOB [x]  No SOB []  SOB on exertion []  SOB min activity []  At rest/acute   e FEV% Predicted       [x]  NA []  Above 69%  []  Unable []  Above 60-69%  []  Unable []  Above 50-59%  []  Unable []  Above 35-49%  []  Unable []  Less than 35%  []  Unable                 []  Hyperinflation Assessment  Score 1 2 3   CXR and Breath Sounds   []  Clear []  No atelectasis  Basilar aeration []  Atelectasis or absent basilar breath

## 2019-08-13 PROCEDURE — 6370000000 HC RX 637 (ALT 250 FOR IP): Performed by: STUDENT IN AN ORGANIZED HEALTH CARE EDUCATION/TRAINING PROGRAM

## 2019-08-13 PROCEDURE — 6360000002 HC RX W HCPCS: Performed by: STUDENT IN AN ORGANIZED HEALTH CARE EDUCATION/TRAINING PROGRAM

## 2019-08-13 PROCEDURE — 94640 AIRWAY INHALATION TREATMENT: CPT

## 2019-08-13 PROCEDURE — 97535 SELF CARE MNGMENT TRAINING: CPT

## 2019-08-13 PROCEDURE — 97530 THERAPEUTIC ACTIVITIES: CPT

## 2019-08-13 PROCEDURE — 99254 IP/OBS CNSLTJ NEW/EST MOD 60: CPT | Performed by: PHYSICAL MEDICINE & REHABILITATION

## 2019-08-13 PROCEDURE — 1200000000 HC SEMI PRIVATE

## 2019-08-13 RX ORDER — POLYETHYLENE GLYCOL 3350 17 G/17G
17 POWDER, FOR SOLUTION ORAL DAILY
Status: DISCONTINUED | OUTPATIENT
Start: 2019-08-13 | End: 2019-08-13

## 2019-08-13 RX ORDER — SENNA PLUS 8.6 MG/1
1 TABLET ORAL 2 TIMES DAILY
Status: DISCONTINUED | OUTPATIENT
Start: 2019-08-13 | End: 2019-08-13

## 2019-08-13 RX ORDER — QUETIAPINE FUMARATE 200 MG/1
400 TABLET, FILM COATED ORAL NIGHTLY
Status: DISCONTINUED | OUTPATIENT
Start: 2019-08-13 | End: 2019-08-15 | Stop reason: HOSPADM

## 2019-08-13 RX ADMIN — QUETIAPINE FUMARATE 200 MG: 200 TABLET ORAL at 09:02

## 2019-08-13 RX ADMIN — ENOXAPARIN SODIUM 30 MG: 100 INJECTION SUBCUTANEOUS at 20:19

## 2019-08-13 RX ADMIN — Medication 400 MG: at 08:52

## 2019-08-13 RX ADMIN — THERA TABS 1 TABLET: TAB at 09:02

## 2019-08-13 RX ADMIN — PROPRANOLOL HYDROCHLORIDE 25 MG: 10 TABLET ORAL at 09:01

## 2019-08-13 RX ADMIN — ALBUTEROL SULFATE 2.5 MG: 2.5 SOLUTION RESPIRATORY (INHALATION) at 08:51

## 2019-08-13 RX ADMIN — ENOXAPARIN SODIUM 30 MG: 100 INJECTION SUBCUTANEOUS at 09:00

## 2019-08-13 RX ADMIN — FOLIC ACID 1 MG: 1 TABLET ORAL at 09:02

## 2019-08-13 RX ADMIN — QUETIAPINE FUMARATE 400 MG: 200 TABLET ORAL at 20:28

## 2019-08-13 RX ADMIN — TRAZODONE HYDROCHLORIDE 100 MG: 100 TABLET ORAL at 20:28

## 2019-08-13 RX ADMIN — Medication 100 MG: at 09:01

## 2019-08-13 ASSESSMENT — PAIN SCALES - GENERAL: PAINLEVEL_OUTOF10: 0

## 2019-08-13 NOTE — CONSULTS
Nightly  QUEtiapine (SEROQUEL) tablet 200 mg, 200 mg, Oral, QAM  QUEtiapine (SEROQUEL) tablet 300 mg, 300 mg, Oral, Nightly  propranolol (INDERAL) tablet 25 mg, 25 mg, Oral, BID  miconazole (MICOTIN) 2 % powder, , Topical, BID  ibuprofen (ADVIL;MOTRIN) 100 MG/5ML suspension 400 mg, 400 mg, Oral, Q6H PRN  albuterol (PROVENTIL) nebulizer solution 2.5 mg, 2.5 mg, Nebulization, Q4H PRN  albuterol (PROVENTIL) nebulizer solution 2.5 mg, 2.5 mg, Nebulization, TID  acetylcysteine (MUCOMYST) 20 % solution 400 mg, 400 mg, Inhalation, TID  traZODone (DESYREL) tablet 100 mg, 100 mg, Oral, Nightly  ondansetron (ZOFRAN) injection 4 mg, 4 mg, Intravenous, Q8H PRN  dextrose 50 % IV solution, 12.5 g, Intravenous, PRN  dextrose 5 % solution, 100 mL/hr, Intravenous, PRN  potassium chloride (KLOR-CON) packet 20 mEq, 20 mEq, Per NG tube, BID  vitamin B-1 (THIAMINE) tablet 100 mg, 100 mg, Oral, TID  lidocaine PF 4 % injection 4 mL, 4 mL, Inhalation, As Directed RT PRN  folic acid (FOLVITE) tablet 1 mg, 1 mg, Oral, Daily  multivitamin 1 tablet, 1 tablet, Oral, Daily  sodium chloride flush 0.9 % injection 10 mL, 10 mL, Intravenous, 2 times per day  sodium chloride flush 0.9 % injection 10 mL, 10 mL, Intravenous, PRN  acetaminophen (TYLENOL) tablet 1,000 mg, 1,000 mg, Oral, Q8H  lidocaine 4 % external patch 2 patch, 2 patch, Transdermal, Daily  enoxaparin (LOVENOX) injection 30 mg, 30 mg, Subcutaneous, BID    Social History:  Social History     Socioeconomic History    Marital status:      Spouse name: Not on file    Number of children: Not on file    Years of education: Not on file    Highest education level: Not on file   Occupational History    Not on file   Social Needs    Financial resource strain: Not on file    Food insecurity:     Worry: Not on file     Inability: Not on file    Transportation needs:     Medical: Not on file     Non-medical: Not on file   Tobacco Use    Smoking status: Current Every Day Smoker

## 2019-08-13 NOTE — PROGRESS NOTES
the visualized skull or soft tissues. 1.  No acute intracranial abnormality. Ct Head Wo Contrast    Result Date: 8/1/2019  EXAMINATION: CT OF THE HEAD WITHOUT CONTRAST  8/1/2019 11:35 am TECHNIQUE: CT of the head was performed without the administration of intravenous contrast. Dose modulation, iterative reconstruction, and/or weight based adjustment of the mA/kV was utilized to reduce the radiation dose to as low as reasonably achievable. COMPARISON: CT brain performed 07/15/2019. HISTORY: ORDERING SYSTEM PROVIDED HISTORY: mental status change TECHNOLOGIST PROVIDED HISTORY: FINDINGS: BRAIN/VENTRICLES: There is no acute intracranial hemorrhage, mass effect, or midline shift. There is satisfactory overall gray-white matter differentiation. Mild cerebral atrophy. The ventricular structures are symmetric and unremarkable. The infratentorial structures are unremarkable. ORBITS: The visualized portion of the orbits demonstrate no acute abnormality. SINUSES: There is fluid in the left mastoid air cells. The right mastoid air cells are normally aerated. There is a polyp versus mucous retention cyst in the left maxillary sinus. There is chronic sinusitis in the right ethmoid air cells. SOFT TISSUES/SKULL:  No acute abnormality of the visualized skull or soft tissues. Mild cerebral atrophy without acute intracranial abnormality. Fluid in the left mastoid air cells representing effusion versus a degree of mastoiditis. Chronic sinusitis in the right ethmoid air cells. Polyp versus mucous retention cyst in the left maxillary sinus.      Ct Head Wo Contrast    Result Date: 7/15/2019  EXAMINATION: CT OF THE HEAD WITHOUT CONTRAST; CT OF THE CERVICAL SPINE WITHOUT CONTRAST 7/15/2019 1:44 am TECHNIQUE: CT of the head was performed without the administration of intravenous contrast. Dose modulation, iterative reconstruction, and/or weight based adjustment of the mA/kV was utilized to reduce the radiation dose to as low as reasonably achievable.; CT of the cervical spine was performed without the administration of intravenous contrast. Multiplanar reformatted images are provided for review. Dose modulation, iterative reconstruction, and/or weight based adjustment of the mA/kV was utilized to reduce the radiation dose to as low as reasonably achievable. COMPARISON: None. HISTORY: ORDERING SYSTEM PROVIDED HISTORY: fall TECHNOLOGIST PROVIDED HISTORY: Reason for Exam: TRAUMA FALL Acuity: Acute Type of Exam: Initial; ORDERING SYSTEM PROVIDED HISTORY: fall TECHNOLOGIST PROVIDED HISTORY: Reason for Exam: TRAUMA FALL Acuity: Acute Type of Exam: Initial FINDINGS: CT HEAD: BRAIN/VENTRICLES: There is no acute intracranial hemorrhage, mass effect or midline shift. No abnormal extra-axial fluid collection. The gray-white differentiation is maintained without evidence of an acute infarct. There is no evidence of hydrocephalus. ORBITS: The visualized portion of the orbits demonstrate no acute abnormality. SINUSES: Left maxillary sinus mucous retention cyst.  The other visualized paranasal sinuses and mastoid air cells demonstrate no acute abnormality. SOFT TISSUES/SKULL:  No acute abnormality of the visualized skull or soft tissues. CT CERVICAL SPINE: No acute fracture. Straightening of the cervical lordosis. 3 mm retrolisthesis of C5 on C6. The craniocervical junction is intact. Partial osseous fusion of C2-C3. No destructive osseous lesion. Multilevel mild-to-moderate degenerative disc and facet joint disease. Multilevel mild-to-moderate cervical spinal canal stenosis secondary to disc osteophyte complexes. Multilevel neural foraminal narrowing secondary to uncovertebral and facet joint disease. No acute focal soft tissue abnormality. Acute nondisplaced fracture of the posterior right 1st rib. No acute intracranial intracranial hemorrhage or mass effect. No acute cervical spine fracture.   Multilevel mild to moderate cervical spondylosis. Acute nondisplaced fracture of the posterior right 1st rib. Ct Chest Wo Contrast    Addendum Date: 7/23/2019    ADDENDUM: 3D reconstructed images of the ribs were performed on a separate workstation and provided for review. Result Date: 7/23/2019  EXAMINATION: CT OF THE CHEST WITHOUT CONTRAST 7/22/2019 12:21 pm TECHNIQUE: CT of the chest was performed without the administration of intravenous contrast. Multiplanar reformatted images are provided for review. Dose modulation, iterative reconstruction, and/or weight based adjustment of the mA/kV was utilized to reduce the radiation dose to as low as reasonably achievable. COMPARISON: Chest radiograph today. Chest CT 07/15/2019. HISTORY: ORDERING SYSTEM PROVIDED HISTORY: CHEST TRAUMA, BLUNT, HIGH ENERGY, FIRST EXAM TECHNOLOGIST PROVIDED HISTORY: This replaces the contrasted study thansk FINDINGS: Mediastinum: The endotracheal tube terminates in appropriate position above the ursula. The enteric tube terminates at the distal esophagus. The heart and great vessels appear normal in size. Sequela of old granulomatous disease. No pericardial effusion. Lungs/pleura: Right chest tube in place terminating posteriorly at the apex. The chest tube is opacified. Small anterior apical right pneumothorax. Scattered ground-glass and interstitial opacities are present in the posterior right upper lobe. Airspace disease in the right lung is noted consistent with a combination of compressive atelectasis and inflammatory change. Complex right pleural fluid is noted, partially loculated posterior medially as well as anteromedially at the apex. High-density blood products are noted laterally that abut the displaced rib fractures. Trace left pleural fluid is noted with dependent atelectasis and scattered areas of ground-glass opacities in the left lung apex.   These focal opacities in the apex are new findings since the initial CT exam.  No pneumothorax provided for review. Dose modulation, iterative reconstruction, and/or weight based adjustment of the mA/kV was utilized to reduce the radiation dose to as low as reasonably achievable. COMPARISON: None HISTORY: ORDERING SYSTEM PROVIDED HISTORY: fall TECHNOLOGIST PROVIDED HISTORY: ; ORDERING SYSTEM PROVIDED HISTORY: fall TECHNOLOGIST PROVIDED HISTORY: fall; ORDERING SYSTEM PROVIDED HISTORY: fall FINDINGS: Chest: Mediastinum: Normal heart and pericardium. Minimal atherosclerosis of the thoracic aorta without evidence of acute injury. No periaortic hemorrhage. Normal caliber main pulmonary artery. Small calcified bilateral hilar/perihilar lymph nodes suggestive of remote granulomatous disease. Small thyroid gland may be atrophic. No intrathoracic lymphadenopathy. No pneumomediastinum. Normal esophagus. Lungs/pleura: Respiratory motion. Patchy and ground-glass opacities with consolidation in the right lung, most pronounced in the right lower lobe and posterior right upper lobe. Findings are most suggestive of pulmonary contusion. Small right posterior hemothorax. Trace right pneumothorax. Mild dependent and basilar subsegmental atelectasis in the left lung. No left pleural effusion or pneumothorax. No endoluminal lesion. Soft Tissues/Bones: Acute nondisplaced fracture of the posterior right 1st rib. Acute multi part fractures of the right 3rd through 12th ribs, some of which are displaced. Old rib fracture deformities of the left posterior 9th and 10th ribs. Small amount of air in the right posterolateral chest wall. Abdomen/Pelvis: Liver: Normal. Gallbladder and Bile Ducts: Normal. Spleen: Normal. Adrenal Glands: Normal. Pancreas: Normal. Genitourinary: Normal. Bowel: Normal. Vasculature: Atherosclerosis. No abdominal aortic aneurysm. No periaortic hemorrhage. Patent main portal vein. Bones and Soft Tissues: Acute, comminuted and nondisplaced fracture of the right iliac wing.   No definite extension into the right acetabulum or right sacroiliac joint. Right flank subcutaneous soft tissue stranding and induration consistent with contusion. Retroperitoneum/Mesentery: No intraperitoneal free air, ascites or fluid collection. No lymphadenopathy in the abdomen or pelvis. THORACIC SPINE: Acute nondisplaced fractures of the right T2, T3, T4, T6, and T9 right transverse processes. The thoracic vertebral body heights are normal.  No destructive osseous lesion. Normal thoracic kyphosis. No significant listhesis. Mild multilevel degenerative disc disease. Small multilevel marginal osteophytes and endplate Schmorl's node deformities. No definite severe thoracic spinal canal stenosis. LUMBAR SPINE: No acute fracture in the lumbar spine. Mild grade 1 anterolisthesis of L4 on L5 secondary to chronic bilateral L5 pars interarticularis defects. No destructive osseous lesion. Mild multilevel degenerative disc and facet joint disease. No definite severe lumbar spinal canal stenosis. Mild-to-moderate bilateral L4-L5 neural foraminal narrowing. Bilateral sacroiliac joint degeneration. 1.  Multiple acute right-sided rib fractures with surrounding small amount of chest wall hemorrhage and gas (grade 2). Small right-sided hemothorax. Trace right pneumothorax. 2.   Patchy and heterogeneous opacities with consolidation throughout the right lung, most pronounced in the right lower lobe and posterior right upper lobe. Findings likely represent a combination of atelectasis and pulmonary contusion (grade 3). 3.  Acute nondisplaced fractures of the right T2, T3, T4, T6 and T9 right transverse processes. 4.  Acute, comminuted and nondisplaced fracture of the right iliac wing. 5.  No solid abdominal organ injury. 6.  Mild multilevel degenerative disease in the thoracic and lumbar spine.      Ct Lumbar Spine Wo Contrast    Result Date: 7/15/2019  EXAMINATION: CT OF THE CHEST, ABDOMEN, AND PELVIS WITH CONTRAST; CT OF THE LUMBAR effusion. Mild left basilar atelectasis. 4. Multiple right-sided rib fracture deformities and right-sided rib ORIF. Distal left clavicular fracture deformity, stable. 5. Right-sided chest tube stable in position. No obvious pneumothorax. Xr Chest Portable    Result Date: 7/26/2019  EXAMINATION: ONE XRAY VIEW OF THE CHEST 7/26/2019 6:24 pm COMPARISON: Chest x-ray 3 hours ago HISTORY: ORDERING SYSTEM PROVIDED HISTORY: extubated and CT DCed TECHNOLOGIST PROVIDED HISTORY: extubated and CT DCed FINDINGS: Life support apparatus stable. Hardware right ribs and skin staples. Subcutaneous gas right lateral chest wall. Multiple rib fractures on the right. Pleuroparenchymal reaction and interstitial infiltrates unchanged on the right. Left lung remains clear. Heart and mediastinum normal.     Stable chest.  No pneumothorax. Stable right pleural parenchymal infiltrates. Xr Chest Portable    Result Date: 7/26/2019  EXAMINATION: ONE XRAY VIEW OF THE CHEST 7/26/2019 3:04 pm COMPARISON: July 26, 2019 8 hours ago HISTORY: ORDERING SYSTEM PROVIDED HISTORY: water seal ct TECHNOLOGIST PROVIDED HISTORY: water seal ct Reason for Exam: water seal chest tube FINDINGS: Enteric tube is in the stomach. ET tube is been removed. Right subclavian catheter terminates in the superior vena cava. Chest tube right upper lobe in right lung base unchanged. Metallic plates transfix 4 ribs on the right. Skin staples again noted on the right and subcutaneous gas along the chest wall. Multiple rib fractures are noted on the right. There is no pneumothorax. Heart and mediastinum normal.  Diffuse interstitial infiltrates and pleural reaction noted on the right. Left lung is relatively clear. ET tube is been removed. 1 chest tube has been removed from the right side. 2 remain. Moderate interstitial edema and pleural reaction unchanged on the right. Multiple rib fractures on the right with fixation hardware. No pneumothorax. fixation of right rib fractures. Subcutaneous emphysema along the right chest again noted. Left lung is clear. Slight improvement aeration of the right lung. No significant change otherwise. Slight improvement in aeration of the right lung. No significant change otherwise. Xr Chest Portable    Result Date: 7/24/2019  EXAMINATION: ONE XRAY VIEW OF THE CHEST 7/24/2019 9:23 pm COMPARISON: 5 hours ago HISTORY: ORDERING SYSTEM PROVIDED HISTORY: advancement of ETT TECHNOLOGIST PROVIDED HISTORY: advancement of ETT FINDINGS: ET and enteric tubes unchanged. Right subclavian central line unchanged. 3 chest tubes on the right unchanged. Plates and screws transfix 4 ribs. Volume loss on the right and pleuroparenchymal reaction. No pneumothorax. Left lung clear. Bony thorax otherwise intact. Skin staples on the right. Subcutaneous gas on the right. Moderate interstitial infiltrates and pleuroparenchymal reaction on the right unchanged. No pneumothorax. Life support apparatus unchanged. Hardware right ribs. Xr Chest Portable    Result Date: 7/24/2019  EXAMINATION: ONE XRAY VIEW OF THE CHEST 7/24/2019 3:58 pm COMPARISON: AP chest from earlier the same day at 14:17 HISTORY: ORDERING SYSTEM PROVIDED HISTORY: S/p central line, OR TECHNOLOGIST PROVIDED HISTORY: S/p central line, OR FINDINGS: Again noted is a right IJ catheter with its tip in the SVC, unchanged. ETT tip stable at the sternoclavicular joint level. Enteric tube tip and side hole project below the left hemidiaphragm. Multiple right-sided chest tubes, rib fixation hardware, and overlying staples. Unchanged mild subcutaneous air. Cardiomediastinal shadow stable. Better aeration left lung with a few scattered calcified granulomas. Focal densities right lung with persistent volume loss and small pleural effusion, similar by comparison. No pneumothorax. Multiple additional right-sided rib and left clavicular fractures, unchanged.      Stable findings, including right IJ central line. No pneumothorax. Stable postoperative changes right hemithorax with airspace and mild pleural disease; additional stable findings, as above. Xr Chest Portable    Result Date: 7/24/2019  EXAMINATION: ONE XRAY VIEW OF THE CHEST 7/24/2019 6:08 am COMPARISON: 07/23/2019, 544 hours HISTORY: ORDERING SYSTEM PROVIDED HISTORY: Intubated, right chest tube TECHNOLOGIST PROVIDED HISTORY: Intubated, right chest tube Reason for Exam: uprt port 79-year-old male who intubated; right-sided chest tube FINDINGS: Portable upright view of the chest. Endotracheal tube distal tip overlying the upper trachea approximately 8.2 cm above the level of the ursula. Enteric tube extends towards the GE junction with distal tip excluded from the field of view. Right-sided chest tube projects over the right upper lung zone, stable. Cardiac monitor leads overlie the chest. No large obvious pneumothorax. No free air. Stable small bilateral pleural effusions, right greater than left. Hazy ground-glass opacity within the right lung, similar to the prior study. Calcified granuloma projects over the left mid lung zone. Right-sided rib fractures remain unchanged. Left-sided distal clavicular fracture again noted. 1. Stable appearance of airspace disease throughout the right lung. Stable small bilateral pleural effusions, right greater than left. 2. Right-sided rib fractures and distal left clavicular fracture again noted. 3. Endotracheal, enteric and right-sided chest tube as above. Xr Chest Portable    Result Date: 7/23/2019  EXAMINATION: ONE XRAY VIEW OF THE CHEST 7/23/2019 5:55 am COMPARISON: July 22, 2019 HISTORY: ORDERING SYSTEM PROVIDED HISTORY: Intubated, right chest tube TECHNOLOGIST PROVIDED HISTORY: Intubated, right chest tube FINDINGS: Endotracheal tube, nasogastric tube, and right-sided chest tubes appear in unchanged position. Cardiac and mediastinal contours are unchanged. Unchanged right pleural effusion with mild diffuse pulmonary opacification of the right hemithorax. Multifocal right lung pulmonary opacities appear unchanged. Left lung remains relatively clear. No evidence of a pneumothorax. No new osseous abnormalities are identified. Right-sided rib fractures appear unchanged. 1. No significant interval change since July 22, 2019. 2. Persistent right pleural effusion with diffuse opacification of right hemithorax. Multifocal right lung pulmonary opacities appear unchanged. RECOMMENDATION: Continued radiographic follow-up. Xr Chest Portable    Result Date: 7/22/2019  EXAMINATION: ONE XRAY VIEW OF THE CHEST 7/22/2019 6:24 am COMPARISON: 07/21/2019, 629 hours HISTORY: ORDERING SYSTEM PROVIDED HISTORY: Intubated, right chest tube TECHNOLOGIST PROVIDED HISTORY: Intubated, right chest tube Reason for Exam: vent care chest tube    upright port 61-year-old male receiving ventilator support/care FINDINGS: AP portable upright view of the chest. Cardiac monitor leads overlie the chest. Endotracheal tube distal tip overlying the mid trachea approximately 6.7 cm above the level of the ursula. Enteric tube traverses the GE junction with distal tip excluded from the field of view. No free air. Right-sided chest tube distal tip projects over the right upper lung zone. No obvious pneumothorax is evident. Diffuse airspace opacity throughout the right lung. Small to moderate right-sided pleural effusion, stable. Left lung remains relatively clear. Cardiac and mediastinal contours remain unchanged. Right-sided rib fracture deformities remain unchanged. 1. Endotracheal, enteric and right-sided chest tubes as detailed above. No obvious pneumothorax evident. 2. Diffuse airspace disease throughout the right lung. Small to moderate right-sided pleural effusion, stable. Follow-up recommended to document resolution.      Xr Chest Portable    Result Date: 7/21/2019  EXAMINATION: ONE Acuity: Unknown Type of Exam: Unknown FINDINGS: Stable cardiomediastinal silhouette. No significant pulmonary edema. Again demonstrated is elevation of the right hemidiaphragm patchy opacification at the right lung base. No new focal lung abnormality. No pneumothorax. Multiple displaced right rib fractures redemonstrated. No pneumothorax. Decreased right lung volume with patchy opacification of the right lung base, similar in appearance. Xr Chest Portable    Result Date: 7/17/2019  EXAMINATION: ONE XRAY VIEW OF THE CHEST 7/17/2019 10:53 am COMPARISON: 16 July 2019 HISTORY: ORDERING SYSTEM PROVIDED HISTORY: multiple rib fractures, desating on RA TECHNOLOGIST PROVIDED HISTORY: multiple rib fractures, desating on RA FINDINGS: AP portable view of the chest time stamped at 1044 hours demonstrates overlying cardiac monitoring electrodes. Heart size is normal.  Left lung is clear. Elevated right hemidiaphragm is noted with right pleural effusion and right basilar opacities which may be related to atelectasis or contusion. Multiple right-sided rib fractures are noted. No appreciable extrapleural air is noted. Trachea is midline. Right-sided basilar opacities with mild interval worsening which may be related to atelectasis is or contusion. Small right effusion. Multiple right-sided rib fractures. No extrapleural air or mediastinal shift. Xr Chest Portable    Result Date: 7/15/2019  EXAMINATION: ONE XRAY VIEW OF THE CHEST 7/15/2019 10:45 am COMPARISON: 7/15/2019 HISTORY: ORDERING SYSTEM PROVIDED HISTORY: fall, rib fx TECHNOLOGIST PROVIDED HISTORY: fall, rib fx Reason for Exam: supine FINDINGS: Stable cardiomediastinal contours. No pneumothorax. Stable diffuse hazy opacities within the right chest.  Diffuse airspace disease is favored. Displaced right 2nd, 3rd, 4th, 5th, 6th, 7th, 8th, 9th, and 10th rib fractures are again noted. Stable diffuse hazy opacity right chest suspicious of pneumonia.

## 2019-08-13 NOTE — PROGRESS NOTES
Physical Therapy  Facility/Department: [facility] 4A STEPDOWN  Daily Treatment Note  NAME: Sj Cerda  : 1960  MRN: [id number]    Date of Service: 2019    Discharge Recommendations:  Patient would benefit from continued therapy after discharge   PT Equipment Recommendations  Equipment Needed: Yes    Assessment   Body structures, Functions, Activity limitations: Decreased functional mobility ; Decreased endurance;Decreased balance;Decreased strength;Decreased safe awareness;Decreased cognition  Assessment: Pt. resistive and refusing tx this visit. Mumbling incoherently. Not following ques. 3 attempts to sit edge of bed. Prognosis: Good  PT Education: Functional Mobility Training;General Safety;Plan of Care;Orientation;Precautions  REQUIRES PT FOLLOW UP: Yes  Activity Tolerance  Activity Tolerance: Patient limited by cognitive status;Treatment limited secondary to agitation  Activity Tolerance: Pt. resistive to sitting edge of bed and not wanting to partcipate. Patient Diagnosis(es): The primary encounter diagnosis was Fall, initial encounter. A diagnosis of Closed fracture of multiple ribs of right side, initial encounter was also pertinent to this visit. has a past medical history of Hemochromatosis and Hypertension. has a past surgical history that includes thoracotomy (Right, 2019) and sternum separation repair (Right, 2019).     Restrictions  Restrictions/Precautions  Restrictions/Precautions: Weight Bearing  Required Braces or Orthoses?: No  Lower Extremity Weight Bearing Restrictions  Right Lower Extremity Weight Bearing: Weight Bearing As Tolerated  Upper Extremity Weight Bearing Restrictions  Left Upper Extremity Weight Bearing: Non Weight Bearing  Position Activity Restriction  Other position/activity restrictions: WBAT R LE (confirmed ), up with assist. CLTS clear, TLSO brace for comfort, sitter and telesitter present  Subjective   General  Chart Reviewed: Yes  Response To

## 2019-08-13 NOTE — CARE COORDINATION
Transitional Planning    [facility] from [place] called, appeal for select [facility] denied. Fourteen SNFs from in-network list for patient were called regarding placement. Most were unable to accept unless patient is sitter free for 24 hours. Two were able to accept patient with sitter but did not currently have any open beds. Call made to patients dorcas Mazariegos, who just left hospital to grab food and will be returning shortly. 46 Met with patients dorcas Mazariegos and discussed SNF options for patient from in-network list. Discussed the places that could take him with sitter but did not currently have beds. Son would like time to review list and get back with us.

## 2019-08-14 ENCOUNTER — APPOINTMENT (OUTPATIENT)
Dept: GENERAL RADIOLOGY | Age: 59
DRG: 957 | End: 2019-08-14
Payer: COMMERCIAL
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
## 2019-08-14 LAB
ABSOLUTE EOS #: 0.25 K/UL (ref 0–0.44)
ABSOLUTE IMMATURE GRANULOCYTE: 0.07 K/UL (ref 0–0.3)
ABSOLUTE LYMPH #: 2.01 K/UL (ref 1.1–3.7)
ABSOLUTE MONO #: 1.43 K/UL (ref 0.1–1.2)
BASOPHILS # BLD: 1 % (ref 0–2)
BASOPHILS ABSOLUTE: 0.07 K/UL (ref 0–0.2)
DIFFERENTIAL TYPE: ABNORMAL
EOSINOPHILS RELATIVE PERCENT: 2 % (ref 1–4)
HCT VFR BLD CALC: 28.1 % (ref 40.7–50.3)
HEMOGLOBIN: 8.7 G/DL (ref 13–17)
IMMATURE GRANULOCYTES: 1 %
LYMPHOCYTES # BLD: 18 % (ref 24–43)
MCH RBC QN AUTO: 29.1 PG (ref 25.2–33.5)
MCHC RBC AUTO-ENTMCNC: 31 G/DL (ref 28.4–34.8)
MCV RBC AUTO: 94 FL (ref 82.6–102.9)
MONOCYTES # BLD: 12 % (ref 3–12)
NRBC AUTOMATED: 0 PER 100 WBC
PDW BLD-RTO: 14.2 % (ref 11.8–14.4)
PLATELET # BLD: 416 K/UL (ref 138–453)
PLATELET ESTIMATE: ABNORMAL
PMV BLD AUTO: 9.7 FL (ref 8.1–13.5)
RBC # BLD: 2.99 M/UL (ref 4.21–5.77)
RBC # BLD: ABNORMAL 10*6/UL
SEG NEUTROPHILS: 66 % (ref 36–65)
SEGMENTED NEUTROPHILS ABSOLUTE COUNT: 7.68 K/UL (ref 1.5–8.1)
WBC # BLD: 11.5 K/UL (ref 3.5–11.3)
WBC # BLD: ABNORMAL 10*3/UL

## 2019-08-14 PROCEDURE — 6370000000 HC RX 637 (ALT 250 FOR IP): Performed by: STUDENT IN AN ORGANIZED HEALTH CARE EDUCATION/TRAINING PROGRAM

## 2019-08-14 PROCEDURE — 36415 COLL VENOUS BLD VENIPUNCTURE: CPT

## 2019-08-14 PROCEDURE — 97530 THERAPEUTIC ACTIVITIES: CPT

## 2019-08-14 PROCEDURE — 99232 SBSQ HOSP IP/OBS MODERATE 35: CPT | Performed by: PHYSICAL MEDICINE & REHABILITATION

## 2019-08-14 PROCEDURE — 2580000003 HC RX 258: Performed by: STUDENT IN AN ORGANIZED HEALTH CARE EDUCATION/TRAINING PROGRAM

## 2019-08-14 PROCEDURE — 85025 COMPLETE CBC W/AUTO DIFF WBC: CPT

## 2019-08-14 PROCEDURE — 6360000002 HC RX W HCPCS: Performed by: STUDENT IN AN ORGANIZED HEALTH CARE EDUCATION/TRAINING PROGRAM

## 2019-08-14 PROCEDURE — 73560 X-RAY EXAM OF KNEE 1 OR 2: CPT

## 2019-08-14 PROCEDURE — 1200000000 HC SEMI PRIVATE

## 2019-08-14 PROCEDURE — 73100 X-RAY EXAM OF WRIST: CPT

## 2019-08-14 PROCEDURE — 6370000000 HC RX 637 (ALT 250 FOR IP): Performed by: EMERGENCY MEDICINE

## 2019-08-14 PROCEDURE — 97127 HC SP THER IVNTJ W/FOCUS COG FUNCJ: CPT

## 2019-08-14 RX ORDER — ALLOPURINOL 100 MG/1
100 TABLET ORAL DAILY
Status: DISCONTINUED | OUTPATIENT
Start: 2019-08-14 | End: 2019-08-15 | Stop reason: HOSPADM

## 2019-08-14 RX ADMIN — ANTI-FUNGAL POWDER MICONAZOLE NITRATE TALC FREE: 1.42 POWDER TOPICAL at 11:39

## 2019-08-14 RX ADMIN — ACETAMINOPHEN 1000 MG: 500 TABLET ORAL at 20:08

## 2019-08-14 RX ADMIN — ENOXAPARIN SODIUM 30 MG: 100 INJECTION SUBCUTANEOUS at 09:30

## 2019-08-14 RX ADMIN — ALLOPURINOL 100 MG: 100 TABLET ORAL at 18:50

## 2019-08-14 RX ADMIN — TRAZODONE HYDROCHLORIDE 100 MG: 100 TABLET ORAL at 20:09

## 2019-08-14 RX ADMIN — ACETAMINOPHEN 1000 MG: 500 TABLET ORAL at 08:59

## 2019-08-14 RX ADMIN — ANTI-FUNGAL POWDER MICONAZOLE NITRATE TALC FREE: 1.42 POWDER TOPICAL at 20:18

## 2019-08-14 RX ADMIN — Medication 10 ML: at 20:18

## 2019-08-14 RX ADMIN — Medication 6 MG: at 20:09

## 2019-08-14 RX ADMIN — QUETIAPINE FUMARATE 400 MG: 200 TABLET ORAL at 20:09

## 2019-08-14 ASSESSMENT — PAIN DESCRIPTION - DESCRIPTORS: DESCRIPTORS: ACHING

## 2019-08-14 ASSESSMENT — PAIN SCALES - WONG BAKER
WONGBAKER_NUMERICALRESPONSE: 4
WONGBAKER_NUMERICALRESPONSE: 6
WONGBAKER_NUMERICALRESPONSE: 4
WONGBAKER_NUMERICALRESPONSE: 4

## 2019-08-14 ASSESSMENT — PAIN DESCRIPTION - ORIENTATION
ORIENTATION: RIGHT;LEFT
ORIENTATION: LEFT

## 2019-08-14 ASSESSMENT — PAIN DESCRIPTION - PAIN TYPE
TYPE: ACUTE PAIN
TYPE: ACUTE PAIN;CHRONIC PAIN

## 2019-08-14 ASSESSMENT — PAIN DESCRIPTION - LOCATION
LOCATION: ARM;LEG
LOCATION: ARM;LEG
LOCATION: KNEE

## 2019-08-14 ASSESSMENT — PAIN DESCRIPTION - FREQUENCY: FREQUENCY: CONTINUOUS

## 2019-08-14 ASSESSMENT — PAIN SCALES - GENERAL: PAINLEVEL_OUTOF10: 7

## 2019-08-14 NOTE — PLAN OF CARE
Problem: Pain:  Goal: Control of acute pain  Description  Control of acute pain   Outcome: Ongoing     Problem: Falls - Risk of:  Goal: Will remain free from falls  Description  Will remain free from falls   Outcome: Ongoing     Problem: Confusion - Acute:  Goal: Absence of continued neurological deterioration signs and symptoms  Description  Absence of continued neurological deterioration signs and symptoms  Outcome: Ongoing     Problem: Injury - Risk of, Physical Injury:  Goal: Will remain free from falls  Description  Will remain free from falls   Outcome: Ongoing

## 2019-08-14 NOTE — PROGRESS NOTES
Surface: level tile  Ambulation 1  Surface: level tile  Device: Rolling Walker  Other Apparatus: O2(1.5L of supplemental O2)  Assistance: Minimal assistance  Quality of Gait: ataxic  Gait Deviations: Decreased step length, Decreased step height  Distance: 5 ft  Comments: Refused sitting edge of bed. OT:  ADL  Feeding: Setup, Stand by assistance  Grooming: Setup, Increased time to complete, Verbal cueing, Minimal assistance(To complete oral care while seated in recliner )  UE Bathing: Verbal cueing, Moderate assistance  LE Bathing: Moderate assistance, Verbal cueing  UE Dressing: Verbal cueing, Moderate assistance(to doff personal shirt and don clean gown while seated in recliner)  LE Dressing: Minimal assistance, Setup, Verbal cueing(patient able to don sock once removed by OT and placed on his toes, able to complete task with increased time)  Toileting: Moderate assistance  Additional Comments: Pt very confused, but following commands this date. Patient needed heavy verbal cueing to engage in functional activity. See above for LOF for ADLs completed. Balance  Sitting Balance: Stand by assistance  Standing Balance: Minimal assistance   Standing Balance  Time: ~30 seconds  Activity: Static standing at chair  Comment: trialed, but patient unable to sequence through         Bed mobility  Bridging: Minimal assistance  Rolling to Left: Moderate assistance  Rolling to Right: Moderate assistance  Supine to Sit: 2 Person assistance, Maximum assistance  Sit to Supine: 2 Person assistance, Moderate assistance  Scooting: Moderate assistance  Comment: Patient very resistive to tx this visit.    Transfers  Stand Step Transfers: Minimal assistance(hand held assistance given)  Sit to stand: Contact guard assistance  Stand to sit: Contact guard assistance  Transfer Comments: Hand held assistance         ST:  pending          Objective:  /87   Pulse 104   Temp 98.7 °F (37.1 °C)   Resp 16   Ht 6' 1\" PT/OT and speech needs   3. Medical  Necessity: As above  4. Support:  will need 24/7 care - clarify support  5. Rehab recommendation: We will follow patient progress for participation, suspect would benefit from acute inpatient rehabilitation when able and willing to participate,  will follow patient's progress, pt from [place] area - son notes would like place close to home  6. DVT proph: Carmina Dillon Courser, MD       This note is created with the assistance of a speech recognition program.  While intending to generate a document that actually reflects the content of the visit, the document can still have some errors including those of syntax and sound a like substitutions which may escape proof reading.   In such instances, actual meaning can be extrapolated by contextual diversion

## 2019-08-14 NOTE — CARE COORDINATION
Transitional Planning  Spoke with patient's son Eneida Mazariegos regarding SNF vs IP rehab options. Given choice, will send referrals to [address] [place] [address] ([place] ([place]), and [facility] [address] [place] [address]). Also plan to contact [facility] and fax updates as they were following earlier in hospital stay for IP rehab. Updates faxed to [id number]. eReferrals also sent. 1320 - Return call from Jeanette Guerra ([phone]) at [facility], states she is still reviewing updated notes but plans to submit for insurance pre-cert today. States they might be able to accept with sitter. She will speak with staff and advise, but if sitter able to be removed, that would be preferable. 1335 - Received call from Nathan Melendrez at [address] [place] [address], states they are full, but she can keep him on waiting list in case SNF search ongoing. 1400 - Return call from Jeanette Guerra at [facility], states she spoke with her CNO and no need to discontinue bedside sitter. States she is submitting pre-cert and hopeful to admit patient tomorrow. 1430 - Return call from Nyla at [facility], states they have no memory care beds available. 1515 - Return call from Monisha Garrett at [facility], states they can not accept with bedside sitter.

## 2019-08-14 NOTE — PROGRESS NOTES
(24hrs), Av, Min:76, Max:87   Pulse Pulse  Av.7  Min: 88  Max: 104 Resp Resp  Av  Min: 16  Max: 16 Pulse ox SpO2  Av %  Min: 93 %  Max: 93 %        General appearance - fatigued this am, can answer questions  HEENT: Normocephalic, Atraumatic, Conjuctiva pink, PERRL, Oral mucosa normal, Lips, teeth and gums normal, Trachea midline, Thyroid normal and No noted lymphadenopathy  Genito/Urinary: deferred  Chest - clear to auscultation, no wheezes, rales or rhonchi, symmetric air entry  Cardiovascular - normal rate, regular rhythm, normal S1, S2, no murmurs, rubs, clicks or gallops  Abdomen - soft, nontender, nondistended, no masses or organomegaly   Neurological - Alert and oriented, Normal speech, No focal findings or movement disorder noted and Motor and sensory grossly normal bilaterally  Integumentary - Skin color, texture, turgor normal. No Rashes or lesions  Musculoskeletal -Full ROM times 4 extremities, No clubbing or cyanosis and No peripheral edema    I/O last 3 completed shifts:  In: -   Out: 200 [Urine:200]    Drain/tube output: In: -   Out: 200 [Urine:200]    LAB:  CBC:   Recent Labs     19  0547   WBC 11.5*   HGB 8.7*   HCT 28.1*   MCV 94.0        BMP: No results for input(s): NA, K, CL, CO2, BUN, CREATININE, GLUCOSE in the last 72 hours. COAGS: No results for input(s): APTT, PROT, INR in the last 72 hours. RADIOLOGY:  Xr Chest (single View Frontal)    Result Date: 2019  EXAMINATION: ONE XRAY VIEW OF THE CHEST 2019 5:58 am COMPARISON: 2019 HISTORY: ORDERING SYSTEM PROVIDED HISTORY: sob TECHNOLOGIST PROVIDED HISTORY: sob Reason for Exam: uprt port FINDINGS: Enteric tube courses beneath the diaphragm. Cardiac leads overlie the chest. Fixation of lateral right ribs. Stable cardiomediastinal contours. Right effusion stable from prior. Central congestion similar. No pneumothorax. Persistent right basilar effusion. Postoperative changes of the thorax. No acute interval change. Xr Chest (single View Frontal)    Result Date: 7/24/2019  EXAMINATION: ONE XRAY VIEW OF THE CHEST 7/24/2019 3:04 pm COMPARISON: Earlier the same day at 514 hours. HISTORY: ORDERING SYSTEM PROVIDED HISTORY: intra op, line placement TECHNOLOGIST PROVIDED HISTORY: intra op, line placement FINDINGS: AP portable view of the chest time stamped at 1417 hours demonstrates overlying cardiac monitoring electrodes. An endotracheal tube terminates 4.4 cm above the ursula. Intestinal tube extends below the diaphragm. Right central line terminates in the superior vena cava. Right internal jugular line terminates in the superior vena cava. Three right chest tubes are noted, 1 terminating in the right apex medially, another in the right upper lung field medially and a third at the right lung base. Right pleural effusion is noted and there are scattered opacities in the right mid and lower lung fields likely related to pulmonary contusion. Heart size is normal. There has been interval placement of multiple plates and pins in the lower rib cage transfixing fractures. Right posterior 2nd rib fracture is noted. Subcutaneous emphysema is noted along the right lateral chest wall and right infraclavicular area. Small amount of extrapleural air right apex is suggested. Heart size is normal.  Stranding is present at the left base likely atelectasis. Skin clips right lower chest are noted. 1.  Interval placement of additional right-sided chest tubes, multiple support tubes and lines. 2.  Opacities in the right mid and lower lung fields likely related to pulmonary contusion. 3.  Interval operative reduction and fixation of right-sided rib fractures lower hemithorax. 4.  Small amount of extrapleural air right apex. Subcutaneous emphysema right side.      Xr Chest (single View Frontal)    Result Date: 7/16/2019  EXAMINATION: ONE XRAY VIEW OF THE CHEST 7/16/2019 8:18 am COMPARISON: July 15, 2019, 21 Date: 8/12/2019  EXAMINATION: CT OF THE HEAD WITHOUT CONTRAST  8/12/2019 12:26 pm TECHNIQUE: CT of the head was performed without the administration of intravenous contrast. Dose modulation, iterative reconstruction, and/or weight based adjustment of the mA/kV was utilized to reduce the radiation dose to as low as reasonably achievable. COMPARISON: 08/01/2019. HISTORY: ORDERING SYSTEM PROVIDED HISTORY: fall while in room TECHNOLOGIST PROVIDED HISTORY: FINDINGS: BRAIN/VENTRICLES: There is no acute intracranial hemorrhage, mass effect or midline shift. No abnormal extra-axial fluid collection. The gray-white differentiation is maintained without evidence of an acute infarct. There is no evidence of hydrocephalus. ORBITS: The visualized portion of the orbits demonstrate no acute abnormality. SINUSES: The visualized paranasal sinuses and mastoid air cells demonstrate no acute abnormality. SOFT TISSUES/SKULL:  No acute abnormality of the visualized skull or soft tissues. 1.  No acute intracranial abnormality. Ct Head Wo Contrast    Result Date: 8/1/2019  EXAMINATION: CT OF THE HEAD WITHOUT CONTRAST  8/1/2019 11:35 am TECHNIQUE: CT of the head was performed without the administration of intravenous contrast. Dose modulation, iterative reconstruction, and/or weight based adjustment of the mA/kV was utilized to reduce the radiation dose to as low as reasonably achievable. COMPARISON: CT brain performed 07/15/2019. HISTORY: ORDERING SYSTEM PROVIDED HISTORY: mental status change TECHNOLOGIST PROVIDED HISTORY: FINDINGS: BRAIN/VENTRICLES: There is no acute intracranial hemorrhage, mass effect, or midline shift. There is satisfactory overall gray-white matter differentiation. Mild cerebral atrophy. The ventricular structures are symmetric and unremarkable. The infratentorial structures are unremarkable. ORBITS: The visualized portion of the orbits demonstrate no acute abnormality.  SINUSES: There is fluid in the the cervical spine was performed without the administration of intravenous contrast. Multiplanar reformatted images are provided for review. Dose modulation, iterative reconstruction, and/or weight based adjustment of the mA/kV was utilized to reduce the radiation dose to as low as reasonably achievable. COMPARISON: 07/15/2019 CT cervical spine and thoracic spine HISTORY: ORDERING SYSTEM PROVIDED HISTORY: fall in room FINDINGS: BONES/ALIGNMENT: There is no evidence of an acute cervical spine fracture. There is normal alignment of the cervical spine. Incidental note of nonacute right 1st rib and right T2 and T3 transverse process fractures near the tip. These also demonstrated on the prior. DEGENERATIVE CHANGES: No significant degenerative changes. SOFT TISSUES: There is no prevertebral soft tissue swelling. No acute abnormality of the cervical spine. Redemonstration of right 1st rib as well as right T2 and T3 transverse process fractures. Xr Chest Portable    Result Date: 8/8/2019  EXAMINATION: ONE XRAY VIEW OF THE CHEST 8/8/2019 11:07 am COMPARISON: August 6, 2019 HISTORY: ORDERING SYSTEM PROVIDED HISTORY: compare atelectasis from 8/6. TECHNOLOGIST PROVIDED HISTORY: compare atelectasis from 8/6. Reason for Exam: compare atelectasis Acuity: Unknown Type of Exam: Unknown FINDINGS: Enteric tube extends beyond the gastroesophageal junction. Left mid lung calcified granuloma. Left lung is clear. Postsurgical changes are again seen within the right lung with partial clearing at the right lung base. Small residual pleural effusion. Partial clearing at the right lung base. Xr Chest Portable    Result Date: 8/4/2019  EXAMINATION: ONE XRAY VIEW OF THE CHEST 8/4/2019 6:24 am COMPARISON: August 3, 2019 HISTORY: ORDERING SYSTEM PROVIDED HISTORY: sob TECHNOLOGIST PROVIDED HISTORY: sob Reason for Exam: sob Acuity: Unknown Type of Exam: Unknown FINDINGS: Persistent right lung opacities.      Right lung opacities are unchanged. Xr Chest Portable    Result Date: 8/3/2019  EXAMINATION: ONE XRAY VIEW OF THE CHEST 8/3/2019 6:00 am COMPARISON: August 1, 2019 HISTORY: ORDERING SYSTEM PROVIDED HISTORY: cough , pna TECHNOLOGIST PROVIDED HISTORY: cough , pna Reason for Exam: cough pna Acuity: Unknown Type of Exam: Unknown Relevant Medical/Surgical History: port ap upright FINDINGS: Enteric tube extends beyond the gastroesophageal junction. Postsurgical changes are seen involving multiple right ribs. No definite pneumothorax. Persistent right lung opacities. Cardiomegaly. No pulmonary edema. Persistent right lung opacities. Xr Chest Portable    Result Date: 8/1/2019  EXAMINATION: ONE XRAY VIEW OF THE CHEST 8/1/2019 2:11 am COMPARISON: July 31, 2019. HISTORY: ORDERING SYSTEM PROVIDED HISTORY: SOA, satting 83 on 15L NR TECHNOLOGIST PROVIDED HISTORY: SOA, satting 83 on 15L NR Reason for Exam: Desat FINDINGS: Frontal portable view of the chest.  Low right lung volume. Heterogeneous opacities throughout the right lung are similar to the prior study. No new consolidation. The left lung is grossly clear. Remote granulomatous disease. No pneumothorax. Small right pleural effusion. Stable cardiomediastinal silhouette and great vessels. Heterogeneous opacities throughout the right lung are similar to the prior study. Small right pleural effusion. No pneumothorax. No new consolidation. Xr Chest Portable    Result Date: 7/31/2019  EXAMINATION: ONE XRAY VIEW OF THE CHEST 7/31/2019 6:07 pm COMPARISON: 9 hours prior HISTORY: ORDERING SYSTEM PROVIDED HISTORY: Chest tube pulled TECHNOLOGIST PROVIDED HISTORY: Chest tube pulled Reason for Exam: upr FINDINGS: Multifocal postoperative change in the right chest wall is noted. Enteric tube extends to the upper abdomen. Previously noted chest tube is no longer present. Ground-glass density throughout the right hemithorax is again noted. This is slightly improved.   There blunting of the right CP angle. There is no definite pneumothorax. Left lung is clear. Bone findings are stable     No pneumothorax. Ground-glass opacities throughout the right hemithorax is minimally improved. Probable small right effusion     Xr Chest Portable    Result Date: 7/31/2019  EXAMINATION: ONE XRAY VIEW OF THE CHEST 7/31/2019 6:49 am COMPARISON: July 30, 2019 HISTORY: ORDERING SYSTEM PROVIDED HISTORY: RT suction returned tube feed; concern for aspiration TECHNOLOGIST PROVIDED HISTORY: RT suction returned tube feed; concern for aspiration FINDINGS: Enteric tube extends beyond the gastroesophageal junction. Right chest tube is in place. No definite pneumothorax. Minimal clearing of right lung opacities. Minimal clearing of right lung opacities. Xr Chest Portable    Result Date: 7/31/2019  EXAMINATION: ONE XRAY VIEW OF THE CHEST 7/31/2019 8:07 am COMPARISON: 31 July 2019 HISTORY: ORDERING SYSTEM PROVIDED HISTORY: advanced Gtube TECHNOLOGIST PROVIDED HISTORY: advanced Gtube Reason for Exam: NG tube advanced. RN states they wanted a chest xray and not to change order FINDINGS: AP portable view of the chest time stamped at 816 hours demonstrates overlying cardiac monitoring electrodes, intestinal tube extending below the diaphragm, right-sided tube, and fixative hardware in the right lower rib cage with surgical clips right lateral chest wall. There is no change in mild scattered right pulmonary opacities when allowances are made for differences in technique and positioning. Small right effusion is noted. No appreciable extrapleural air is seen. RECOMMENDATION: No significant change from prior study. No change in scattered right pulmonary opacities. Multiple tubes and lines as above. Small right pleural effusion. No appreciable extrapleural air.      Xr Chest Portable    Result Date: 7/30/2019  EXAMINATION: ONE XRAY VIEW OF THE CHEST 7/30/2019 9:17 am COMPARISON: Chest radiograph performed the GE junction unchanged. 2 chest tubes at the right lung base and right apex unchanged in position. A metallic fixation for rib fractures on the right. Skin staples present laterally. Moderate interstitial infiltrate on the right unchanged. No pneumothorax. Left lung clear. Heart and mediastinum normal.     Pleuroparenchymal reaction and multiple rib fractures on the right unchanged. Life support apparatus as above. No acute disease. Xr Chest Portable    Result Date: 7/27/2019  EXAMINATION: ONE XRAY VIEW OF THE CHEST 7/27/2019 12:10 pm COMPARISON: 07/26/2019, 1755 hours HISTORY: ORDERING SYSTEM PROVIDED HISTORY: post water seal TECHNOLOGIST PROVIDED HISTORY: post water seal Reason for Exam: upright portable, post water seal 60-year-old male post water seal FINDINGS: Portable upright view of the chest. Enteric tube traverses the GE junction with distal tip projecting over the medial left upper quadrant likely within the fundus of the stomach. Position of the side-port may be just above the level of the GE junction. Cardiac monitor leads overlie the chest. Right-sided chest tubes are stable in position. ORIF hardware projecting over the right-sided ribs is similar in appearance. Skin staples projecting over the lower right lateral chest wall again noted. Multiple right-sided rib fracture deformities. No obvious pneumothorax. No free air. Mild left basilar atelectasis. Stable appearance of distal left clavicular fracture deformity. Cardiac and mediastinal contours remain unchanged. Stable diffuse airspace opacities throughout the right lung, similar to the prior study. Small right-sided pleural effusion. Lungs are relatively stable in appearance compared with the prior study. Right-sided subclavian central venous catheter has been removed. 1. Right-sided subclavian central venous catheter has been removed.  2. Enteric tube traverses the GE junction with distal tip likely in the fundus of the stomach. Side-port is likely just above the level of the GE junction. Consider repositioning if clinically indicated. 3. Stable airspace opacities throughout the right lung, similar to the prior study. Small right-sided pleural effusion. Mild left basilar atelectasis. 4. Multiple right-sided rib fracture deformities and right-sided rib ORIF. Distal left clavicular fracture deformity, stable. 5. Right-sided chest tube stable in position. No obvious pneumothorax. Xr Chest Portable    Result Date: 7/26/2019  EXAMINATION: ONE XRAY VIEW OF THE CHEST 7/26/2019 6:24 pm COMPARISON: Chest x-ray 3 hours ago HISTORY: ORDERING SYSTEM PROVIDED HISTORY: extubated and CT DCed TECHNOLOGIST PROVIDED HISTORY: extubated and CT DCed FINDINGS: Life support apparatus stable. Hardware right ribs and skin staples. Subcutaneous gas right lateral chest wall. Multiple rib fractures on the right. Pleuroparenchymal reaction and interstitial infiltrates unchanged on the right. Left lung remains clear. Heart and mediastinum normal.     Stable chest.  No pneumothorax. Stable right pleural parenchymal infiltrates. Xr Chest Portable    Result Date: 7/26/2019  EXAMINATION: ONE XRAY VIEW OF THE CHEST 7/26/2019 3:04 pm COMPARISON: July 26, 2019 8 hours ago HISTORY: ORDERING SYSTEM PROVIDED HISTORY: water seal ct TECHNOLOGIST PROVIDED HISTORY: water seal ct Reason for Exam: water seal chest tube FINDINGS: Enteric tube is in the stomach. ET tube is been removed. Right subclavian catheter terminates in the superior vena cava. Chest tube right upper lobe in right lung base unchanged. Metallic plates transfix 4 ribs on the right. Skin staples again noted on the right and subcutaneous gas along the chest wall. Multiple rib fractures are noted on the right. There is no pneumothorax. Heart and mediastinum normal.  Diffuse interstitial infiltrates and pleural reaction noted on the right. Left lung is relatively clear.      ET tube is been removed. 1 chest tube has been removed from the right side. 2 remain. Moderate interstitial edema and pleural reaction unchanged on the right. Multiple rib fractures on the right with fixation hardware. No pneumothorax. Life support apparatus otherwise stable. Xr Chest Portable    Result Date: 7/26/2019  EXAMINATION: ONE XRAY VIEW OF THE CHEST 7/26/2019 6:06 am COMPARISON: 07/25/2019, 519 hours HISTORY: ORDERING SYSTEM PROVIDED HISTORY: Intubated, right chest tube TECHNOLOGIST PROVIDED HISTORY: Intubated, right chest tube 45-year-old male with right-sided chest tube; intubated FINDINGS: Portable AP upright view of the chest. Endotracheal tube distal tip overlying the mid trachea approximately 5.4 cm above the level of the ursula. Right subclavian approach central venous catheter distal tip overlying the lower SVC. Enteric tube traverses the GE junction with distal tip excluded from the field of view. Cardiac monitor leads overlie the chest.  Right-sided chest tubes are stable in position compared with the previous examination. Hardware from prior rib fracture ORIF is again noted. Subcutaneous gas is seen along the right axilla and right chest wall with overlying skin staples. Left lung remains relatively clear. Stable diffuse airspace disease throughout the right lung. Stable small right-sided pleural effusion and scarring/atelectasis at the right lung base. Visualized osseous structures similar in appearance compared with the prior study. Multiple right-sided rib fracture deformities. No free air. No large obvious pneumothorax. Overall, relatively stable portable chest compared with 07/25/2019, 519 hours.      Xr Chest Portable    Result Date: 7/25/2019  EXAMINATION: ONE XRAY VIEW OF THE CHEST 7/25/2019 6:24 am COMPARISON: July 24, 2019 HISTORY: ORDERING SYSTEM PROVIDED HISTORY: Intubated, right chest tube TECHNOLOGIST PROVIDED HISTORY: Intubated, right chest tube Reason for Exam: supine contours are stable. Right rib fractures are re-demonstrated. Enteric tube courses below the diaphragm with the tip not included on the study. 1. New right chest tube with decreased small right pleural effusion. No pneumothorax. 2. Endotracheal tube terminates 6.1 cm above the ursula. Xr Chest Portable    Result Date: 7/20/2019  EXAMINATION: ONE XRAY VIEW OF THE CHEST 7/20/2019 12:05 pm COMPARISON: June 28, 2019 HISTORY: ORDERING SYSTEM PROVIDED HISTORY: ETT placement TECHNOLOGIST PROVIDED HISTORY: ETT placement Reason for Exam: ETT placement. upright port FINDINGS: Patient is intubated with endotracheal tube approximately 7 cm above the ursula. Demonstration of right lung opacity which likely relates to effusion and lower lobe atelectatic changes. The left chest is fairly clear. Heart is mildly enlarged. Patient intubated with endotracheal tube 7 cm above the ursula. Redemonstration of increased right lung opacity related to effusion/infiltrate. Xr Chest Portable    Result Date: 7/20/2019  EXAMINATION: ONE XRAY VIEW OF THE CHEST 7/20/2019 9:07 am COMPARISON: 18 July 2019 HISTORY: ORDERING SYSTEM PROVIDED HISTORY: Tachypnea TECHNOLOGIST PROVIDED HISTORY: Tachypnea Reason for Exam: tachypnea. upright port FINDINGS: AP portable view of the chest time stamped at 806 hours demonstrates overlying cardiac monitoring electrodes. Heart size is normal and stable. There has been worsening of scattered opacities in the right hemithorax since prior study. Left lung is clear. A right effusion is evident. No extrapleural air is seen. Left lung is clear. Multiple right-sided rib fractures are redemonstrated without change. Worsening scattered opacities in the right hemithorax. There is volume loss with elevation of the right hemidiaphragm and right effusion. No appreciable extrapleural air is noted. Left lung is clear. Multiple right-sided rib fractures are noted.      Xr Chest Portable    Result Date: 7/18/2019  EXAMINATION: ONE XRAY VIEW OF THE CHEST 7/18/2019 8:02 am COMPARISON: None. HISTORY: ORDERING SYSTEM PROVIDED HISTORY: pneumo TECHNOLOGIST PROVIDED HISTORY: pneumo Reason for Exam: hx. pneumo./  AP erect/  GP used Acuity: Unknown Type of Exam: Unknown FINDINGS: Stable cardiomediastinal silhouette. No significant pulmonary edema. Again demonstrated is elevation of the right hemidiaphragm patchy opacification at the right lung base. No new focal lung abnormality. No pneumothorax. Multiple displaced right rib fractures redemonstrated. No pneumothorax. Decreased right lung volume with patchy opacification of the right lung base, similar in appearance. Xr Chest Portable    Result Date: 7/17/2019  EXAMINATION: ONE XRAY VIEW OF THE CHEST 7/17/2019 10:53 am COMPARISON: 16 July 2019 HISTORY: ORDERING SYSTEM PROVIDED HISTORY: multiple rib fractures, desating on RA TECHNOLOGIST PROVIDED HISTORY: multiple rib fractures, desating on RA FINDINGS: AP portable view of the chest time stamped at 1044 hours demonstrates overlying cardiac monitoring electrodes. Heart size is normal.  Left lung is clear. Elevated right hemidiaphragm is noted with right pleural effusion and right basilar opacities which may be related to atelectasis or contusion. Multiple right-sided rib fractures are noted. No appreciable extrapleural air is noted. Trachea is midline. Right-sided basilar opacities with mild interval worsening which may be related to atelectasis is or contusion. Small right effusion. Multiple right-sided rib fractures. No extrapleural air or mediastinal shift. Xr Chest Portable    Result Date: 7/15/2019  EXAMINATION: ONE XRAY VIEW OF THE CHEST 7/15/2019 10:45 am COMPARISON: 7/15/2019 HISTORY: ORDERING SYSTEM PROVIDED HISTORY: fall, rib fx TECHNOLOGIST PROVIDED HISTORY: fall, rib fx Reason for Exam: supine FINDINGS: Stable cardiomediastinal contours. No pneumothorax.   Stable diffuse hazy Trauma, [facility]  Attending Note      I have reviewed the above [facility]SS note(s) and confirmed the key elements of the medical history and physical exam. I have discussed the findings, established the care plan and recommendations with Resident, TECSS RN, bedside nurse. Seen and examined this am by me. No change in confusion. Possible behavioral health transition.     Pat Mosley MD  8/14/2019  9:25 AM

## 2019-08-14 NOTE — PROGRESS NOTES
Speech Language Pathology  Speech Language Pathology  [address] [facility]    Cognitive Treatment Note    Date: 8/14/2019  Patients Name: Charli Cowart  MRN: [id number]  Diagnosis:   Patient Active Problem List   Diagnosis Code    Fall W19. Vivileen Dove    Closed fracture of multiple ribs of right side S22.41XA    Contusion of right lung S27.321A    Fracture of transverse process of thoracic vertebra (HCC) S22.009A    Hemothorax on right J94.2    Pneumothorax J93.9    Closed fracture of right iliac wing (HCC) S32.301A    Hypokalemia E87.6    Fx dorsal vertebra-closed ([facility]) S22.009A    Traumatic closed fracture of distal clavicle with minimal displacement, left, initial encounter S42.032A       Pain: 0/10    Cognitive Treatment    Treatment time: 2306-2054      Subjective: [x] Alert [x] Cooperative     [] Confused     [] Agitated    [x] Lethargic      Objective/Assessment:  Attention: Required cues to remain awake and alert, when alert and awake pt. Is internally and externally distracted. Orientation: 75% no increased with cues    Recall: 0/1,0/1 with cues and distraction, 3/3,3/3 immediate recall    Organization: Antonyms 3/4    Problem Solving/Reasoning: For simple safety situations 2/2. Task insight 1-3 increased to 2/3 with cues      Plan:  [x] Continue  services    [] Discharge from :      Discharge recommendations: [] Inpatient Rehab   [] [place]   [] Outpatient Therapy  [] Follow up at trauma clinic   [x] Other:  Further therapy recommended at discharge. \       Treatment completed by:  Kaylen Ferreira M.S. [address] [facility]

## 2019-08-14 NOTE — PROGRESS NOTES
Physical Therapy  Facility/Department: [facility] STEPDOWN  Daily Treatment Note  NAME: Sally Sheridan  : 1960  MRN: [id number]    Date of Service: 2019    Discharge Recommendations:  Patient would benefit from continued therapy after discharge   PT Equipment Recommendations  Equipment Needed: No    Assessment   Body structures, Functions, Activity limitations: Decreased functional mobility ; Decreased endurance;Decreased balance;Decreased strength;Decreased safe awareness;Decreased cognition  Assessment: Pt reamains resistive to cooperating with therapy this date. Sat EOB x 3 trials with MAX Ax 2, tolerating <30 seconds  Prognosis: Fair  REQUIRES PT FOLLOW UP: Yes  Activity Tolerance  Activity Tolerance: Patient limited by cognitive status;Treatment limited secondary to agitation     Patient Diagnosis(es): The primary encounter diagnosis was Fall, initial encounter. A diagnosis of Closed fracture of multiple ribs of right side, initial encounter was also pertinent to this visit. has a past medical history of Hemochromatosis and Hypertension. has a past surgical history that includes thoracotomy (Right, 2019) and sternum separation repair (Right, 2019).     Restrictions  Restrictions/Precautions  Restrictions/Precautions: Weight Bearing  Required Braces or Orthoses?: No  Lower Extremity Weight Bearing Restrictions  Right Lower Extremity Weight Bearing: Weight Bearing As Tolerated  Upper Extremity Weight Bearing Restrictions  Left Upper Extremity Weight Bearing: Non Weight Bearing  Position Activity Restriction  Other position/activity restrictions: WBAT R LE (confirmed ), up with assist. CLTS clear, TLSO brace for comfort, sitter and telesitter present  Subjective   General  Response To Previous Treatment: Patient unable to report, no changes reported from family or staff  Family / Caregiver Present: Yes(2 sons present, very encouraging, however pt continues to be resistive to therapy)  Subjective  Subjective: RN agreeable to therapy, pt sleeping in bed upon arrival, easily arousable, mumbling incoherently   General Comment  Comments: Pt remains confused and impulsive, difficult to redirected this visit. Pain Screening  Patient Currently in Pain: Yes  Pain Assessment  Pain Assessment: Faces  Montoya-Baker Pain Rating: Hurts little more  Pain Type: Acute pain  Pain Location: Arm;Leg  Pain Orientation: Right;Left  Non-Pharmaceutical Pain Intervention(s): Repositioned  Response to Pain Intervention: Patient Satisfied  Vital Signs  Patient Currently in Pain: Yes       Orientation  Orientation  Overall Orientation Status: Impaired  Orientation Level: Disoriented to situation;Disoriented to place; Disoriented to time;Disoriented to person  Cognition      Objective   Bed mobility  Rolling to Left: Stand by assistance  Rolling to Right: Stand by assistance  Supine to Sit: 2 Person assistance;Maximum assistance  Sit to Supine: Maximum assistance;2 Person assistance  Comment: Pt cooperative with rolling for proper positioning and completed with SBA,  pt heavily resisting sitting EOB, requiring MAX A x2, x 3 trials, sitting up <30 sec each  Transfers  Sit to Stand: Unable to assess  Comment: Unable to assess this date as pt not cooperative  Ambulation  Ambulation?: No     Balance  Posture: Fair  Sitting - Static: Poor  Comments: Heavily resisting therapist while sitting EOB            Goals  Short term goals  Time Frame for Short term goals: 10 visits  Short term goal 1: transfers with SBA  Short term goal 2: amb 150 ft with a RW x SBA  Short term goal 3: ascend/descend 4 steps with SBA  Short term goal 4:  To be indepedent with bed mobility  Patient Goals   Patient goals : Return home    Plan    Plan  Times per week: 5-6x/week  Times per day: Daily  Current Treatment Recommendations: Strengthening, Transfer Training, Endurance Training, Gait Training, Functional Mobility Training, Stair training, Pain Management, Safety Education & Training  Safety Devices  Type of devices: Patient at risk for falls, Call light within reach, Left in bed, Nurse notified, Sitter present, All fall risk precautions in place  Restraints  Initially in place: No     Therapy Time   Individual Concurrent Group Co-treatment   Time In 0916         Time Out 0942         Minutes [address] [place] [address]

## 2019-08-15 VITALS
WEIGHT: 159.17 LBS | DIASTOLIC BLOOD PRESSURE: 85 MMHG | HEART RATE: 72 BPM | HEIGHT: 73 IN | SYSTOLIC BLOOD PRESSURE: 119 MMHG | TEMPERATURE: 100.8 F | BODY MASS INDEX: 21.1 KG/M2 | RESPIRATION RATE: 18 BRPM | OXYGEN SATURATION: 98 %

## 2019-08-15 PROBLEM — A41.9 SEPSIS (HCC): Status: ACTIVE | Noted: 2019-08-15

## 2019-08-15 LAB
ABSOLUTE EOS #: 0.24 K/UL (ref 0–0.44)
ABSOLUTE IMMATURE GRANULOCYTE: 0.06 K/UL (ref 0–0.3)
ABSOLUTE LYMPH #: 1.51 K/UL (ref 1.1–3.7)
ABSOLUTE MONO #: 1.44 K/UL (ref 0.1–1.2)
BASOPHILS # BLD: 1 % (ref 0–2)
BASOPHILS ABSOLUTE: 0.05 K/UL (ref 0–0.2)
DIFFERENTIAL TYPE: ABNORMAL
EKG ATRIAL RATE: 106 BPM
EKG P AXIS: 64 DEGREES
EKG P-R INTERVAL: 184 MS
EKG Q-T INTERVAL: 348 MS
EKG QRS DURATION: 80 MS
EKG QTC CALCULATION (BAZETT): 462 MS
EKG R AXIS: 0 DEGREES
EKG T AXIS: 49 DEGREES
EKG VENTRICULAR RATE: 106 BPM
EOSINOPHILS RELATIVE PERCENT: 3 % (ref 1–4)
HCT VFR BLD CALC: 28.4 % (ref 40.7–50.3)
HEMOGLOBIN: 8.7 G/DL (ref 13–17)
IMMATURE GRANULOCYTES: 1 %
LYMPHOCYTES # BLD: 17 % (ref 24–43)
MCH RBC QN AUTO: 28.7 PG (ref 25.2–33.5)
MCHC RBC AUTO-ENTMCNC: 30.6 G/DL (ref 28.4–34.8)
MCV RBC AUTO: 93.7 FL (ref 82.6–102.9)
MONOCYTES # BLD: 16 % (ref 3–12)
NRBC AUTOMATED: 0 PER 100 WBC
PDW BLD-RTO: 14.1 % (ref 11.8–14.4)
PLATELET # BLD: 352 K/UL (ref 138–453)
PLATELET ESTIMATE: ABNORMAL
PMV BLD AUTO: 9.5 FL (ref 8.1–13.5)
RBC # BLD: 3.03 M/UL (ref 4.21–5.77)
RBC # BLD: ABNORMAL 10*6/UL
SEG NEUTROPHILS: 62 % (ref 36–65)
SEGMENTED NEUTROPHILS ABSOLUTE COUNT: 5.49 K/UL (ref 1.5–8.1)
WBC # BLD: 8.8 K/UL (ref 3.5–11.3)
WBC # BLD: ABNORMAL 10*3/UL

## 2019-08-15 PROCEDURE — 6370000000 HC RX 637 (ALT 250 FOR IP): Performed by: STUDENT IN AN ORGANIZED HEALTH CARE EDUCATION/TRAINING PROGRAM

## 2019-08-15 PROCEDURE — 97127 HC SP THER IVNTJ W/FOCUS COG FUNCJ: CPT

## 2019-08-15 PROCEDURE — 97530 THERAPEUTIC ACTIVITIES: CPT

## 2019-08-15 PROCEDURE — 2580000003 HC RX 258: Performed by: STUDENT IN AN ORGANIZED HEALTH CARE EDUCATION/TRAINING PROGRAM

## 2019-08-15 PROCEDURE — 36415 COLL VENOUS BLD VENIPUNCTURE: CPT

## 2019-08-15 PROCEDURE — 93005 ELECTROCARDIOGRAM TRACING: CPT | Performed by: STUDENT IN AN ORGANIZED HEALTH CARE EDUCATION/TRAINING PROGRAM

## 2019-08-15 PROCEDURE — 6360000002 HC RX W HCPCS: Performed by: STUDENT IN AN ORGANIZED HEALTH CARE EDUCATION/TRAINING PROGRAM

## 2019-08-15 PROCEDURE — 85025 COMPLETE CBC W/AUTO DIFF WBC: CPT

## 2019-08-15 PROCEDURE — 93010 ELECTROCARDIOGRAM REPORT: CPT | Performed by: INTERNAL MEDICINE

## 2019-08-15 RX ORDER — QUETIAPINE FUMARATE 400 MG/1
400 TABLET, FILM COATED ORAL NIGHTLY
Qty: 60 TABLET | Refills: 3 | Status: SHIPPED | OUTPATIENT
Start: 2019-08-15 | End: 2019-09-03

## 2019-08-15 RX ORDER — ALLOPURINOL 100 MG/1
100 TABLET ORAL DAILY
Qty: 30 TABLET | Refills: 3 | Status: SHIPPED | OUTPATIENT
Start: 2019-08-16 | End: 2019-12-26 | Stop reason: SDUPTHER

## 2019-08-15 RX ORDER — PROPRANOLOL HYDROCHLORIDE 20 MG/1
25 TABLET ORAL 2 TIMES DAILY
Qty: 90 TABLET | Refills: 3 | Status: SHIPPED | OUTPATIENT
Start: 2019-08-15 | End: 2019-09-03

## 2019-08-15 RX ORDER — PSEUDOEPHEDRINE HCL 30 MG
100 TABLET ORAL DAILY
Qty: 30 CAPSULE | Refills: 0 | Status: SHIPPED | OUTPATIENT
Start: 2019-08-16 | End: 2019-09-15

## 2019-08-15 RX ORDER — QUETIAPINE FUMARATE 200 MG/1
200 TABLET, FILM COATED ORAL EVERY MORNING
Qty: 60 TABLET | Refills: 3 | Status: SHIPPED | OUTPATIENT
Start: 2019-08-16 | End: 2019-09-03

## 2019-08-15 RX ORDER — LANOLIN ALCOHOL/MO/W.PET/CERES
100 CREAM (GRAM) TOPICAL 3 TIMES DAILY
Qty: 30 TABLET | Refills: 3 | Status: SHIPPED | OUTPATIENT
Start: 2019-08-15 | End: 2019-10-15

## 2019-08-15 RX ORDER — DOCUSATE SODIUM 100 MG/1
100 CAPSULE, LIQUID FILLED ORAL DAILY
Status: DISCONTINUED | OUTPATIENT
Start: 2019-08-15 | End: 2019-08-15 | Stop reason: HOSPADM

## 2019-08-15 RX ORDER — ALBUTEROL SULFATE 2.5 MG/3ML
2.5 SOLUTION RESPIRATORY (INHALATION) EVERY 4 HOURS PRN
Qty: 120 EACH | Refills: 3 | Status: SHIPPED | OUTPATIENT
Start: 2019-08-15 | End: 2019-09-03

## 2019-08-15 RX ORDER — POLYETHYLENE GLYCOL 3350 17 G/17G
17 POWDER, FOR SOLUTION ORAL DAILY
Qty: 527 G | Refills: 1 | Status: SHIPPED | OUTPATIENT
Start: 2019-08-15 | End: 2019-09-14

## 2019-08-15 RX ORDER — TRAZODONE HYDROCHLORIDE 100 MG/1
100 TABLET ORAL NIGHTLY
Qty: 30 TABLET | Refills: 0 | Status: SHIPPED | OUTPATIENT
Start: 2019-08-15 | End: 2019-10-15

## 2019-08-15 RX ORDER — POLYETHYLENE GLYCOL 3350 17 G/17G
17 POWDER, FOR SOLUTION ORAL DAILY
Status: DISCONTINUED | OUTPATIENT
Start: 2019-08-15 | End: 2019-08-15 | Stop reason: HOSPADM

## 2019-08-15 RX ORDER — UREA 10 %
6 LOTION (ML) TOPICAL NIGHTLY
Qty: 180 TABLET | Refills: 0 | Status: SHIPPED | OUTPATIENT
Start: 2019-08-15 | End: 2019-10-15

## 2019-08-15 RX ORDER — IBUPROFEN 400 MG/1
400 TABLET ORAL EVERY 6 HOURS PRN
Qty: 120 TABLET | Refills: 3 | Status: SHIPPED | OUTPATIENT
Start: 2019-08-15 | End: 2019-10-15

## 2019-08-15 RX ORDER — MULTIVITAMIN WITH FOLIC ACID 400 MCG
1 TABLET ORAL DAILY
Qty: 30 TABLET | Refills: 0 | Status: SHIPPED | OUTPATIENT
Start: 2019-08-16 | End: 2021-11-22

## 2019-08-15 RX ADMIN — Medication 100 MG: at 09:51

## 2019-08-15 RX ADMIN — DOCUSATE SODIUM 100 MG: 100 CAPSULE, LIQUID FILLED ORAL at 09:57

## 2019-08-15 RX ADMIN — PROPRANOLOL HYDROCHLORIDE 25 MG: 10 TABLET ORAL at 09:50

## 2019-08-15 RX ADMIN — ALLOPURINOL 100 MG: 100 TABLET ORAL at 09:51

## 2019-08-15 RX ADMIN — THERA TABS 1 TABLET: TAB at 09:52

## 2019-08-15 RX ADMIN — FOLIC ACID 1 MG: 1 TABLET ORAL at 09:50

## 2019-08-15 RX ADMIN — QUETIAPINE FUMARATE 200 MG: 200 TABLET ORAL at 09:52

## 2019-08-15 RX ADMIN — POTASSIUM CHLORIDE 20 MEQ: 1.5 POWDER, FOR SOLUTION ORAL at 09:51

## 2019-08-15 RX ADMIN — ANTI-FUNGAL POWDER MICONAZOLE NITRATE TALC FREE: 1.42 POWDER TOPICAL at 10:11

## 2019-08-15 RX ADMIN — ENOXAPARIN SODIUM 30 MG: 100 INJECTION SUBCUTANEOUS at 09:52

## 2019-08-15 RX ADMIN — Medication 10 ML: at 10:13

## 2019-08-15 NOTE — DISCHARGE SUMMARY
Discharge order received, and patient is approved for admission at [facility]. Patient AVS faxed over to the admitting facility by the . Transportation arrived at [phone] to  patient and left by 12:18pm. Patient now out of the unit at 12:19. RN spoke with the tele sitter and discontinued monitoring. Report given to Gui Mooney at [facility] @12:30. She verbalized understanding and will call back for any questions.

## 2019-08-15 NOTE — PROGRESS NOTES
Previous Treatment: Patient with no complaints from previous session. Family / Caregiver Present: Yes(brother, left for session)  Subjective  Subjective: RN agreeable to PT. author, RN , and and pt's brother encouraging to EOB. pt agreed to EOB after increased time. Pain Screening  Patient Currently in Pain: Yes(R wrist, RLE. did not quantify.)  Pain Assessment  Non-Pharmaceutical Pain Intervention(s): Ambulation/Increased Activity;Repositioned; Emotional support  Response to Pain Intervention: Patient Satisfied  Vital Signs  Patient Currently in Pain: Yes(R wrist, RLE. did not quantify.)  Pre Treatment Pain Screening  Intervention List: Patient able to continue with treatment    Orientation  Orientation  Overall Orientation Status: Impaired  Orientation Level: Oriented to person  Cognition   Cognition  Overall Cognitive Status: Exceptions  Arousal/Alertness: Inconsistent responses to stimuli;Delayed responses to stimuli  Following Commands: Inconsistently follows commands; Follows one step commands with increased time; Follows one step commands with repetition  Attention Span: Difficulty dividing attention; Difficulty attending to directions  Memory: Decreased recall of recent events;Decreased recall of biographical Information  Safety Judgement: Decreased awareness of need for assistance;Decreased awareness of need for safety  Problem Solving: Decreased awareness of errors;Assistance required to implement solutions;Assistance required to generate solutions;Assistance required to identify errors made  Insights: Not aware of deficits  Initiation: Requires cues for all  Sequencing: Requires cues for all  Objective   Bed mobility  Supine to Sit: 2 Person assistance;Maximum assistance  Transfers  Comment: Pt refusing any standing/ ambulation. max encouragement to remain EOB 3min.   Ambulation  Ambulation?: No  Stairs/Curb  Stairs?: No     Balance  Sitting - Static: Good;-  Sitting - Dynamic: Fair;+  Comments: initially

## 2019-08-15 NOTE — PLAN OF CARE
Problem: Pain:  Goal: Pain level will decrease  Description  Pain level will decrease   Outcome: Met This Shift     Problem: Pain:  Goal: Control of acute pain  Description  Control of acute pain   Outcome: Met This Shift     Problem: Falls - Risk of:  Goal: Will remain free from falls  Description  Will remain free from falls   Outcome: Met This Shift     Problem: Falls - Risk of:  Goal: Absence of physical injury  Description  Absence of physical injury   Outcome: Met This Shift     Problem: Injury - Risk of, Physical Injury:  Goal: Will remain free from falls  Description  Will remain free from falls   Outcome: Met This Shift     Problem: Injury - Risk of, Physical Injury:  Goal: Absence of physical injury  Description  Absence of physical injury   Outcome: Met This Shift     Problem: Confusion - Acute:  Goal: Absence of continued neurological deterioration signs and symptoms  Description  Absence of continued neurological deterioration signs and symptoms  Outcome: Ongoing     Problem: Confusion - Acute:  Goal: Mental status will be restored to baseline  Description  Mental status will be restored to baseline  Outcome: Ongoing

## 2019-08-15 NOTE — PROGRESS NOTES
PROGRESS NOTE    PATIENT NAME: Kriss [facility] RECORD NO. [id number]  DATE: 8/15/2019  SURGEON:  Dr. Viridiana Hager: Jenn Castro MD    HD: # [id number]    ASSESSMENT    Patient Active Problem List   Diagnosis    Closed fracture of multiple ribs of right side    Contusion of right lung    Fracture of transverse process of thoracic vertebra ([address]    Hemothorax on right    Pneumothorax    Closed fracture of right iliac wing (HCC)    Hypokalemia    Fx dorsal vertebra-closed ([address]    Traumatic closed fracture of distal clavicle with minimal displacement, left, initial encounter       [address] [facility]    1. Diet: General with nutritional supplements  2. Pain Control: tylenol, lidoderm daily. Motrin prn  3. Sedation: Protracted delirium is improving. trazodone and melatonin nightly. seroquel 400 mg nightly. seroquel 200 mg daily   4. Inderal daily  5. Bowel:  milk of mag prn. Colace and miralax daily   6. DVT Prophylaxis: lovenox  7. Left knee and R wrist pain s/p fall at bedside. Imaging negative   8. Discharge Needs:  d/c planning pending acceptance. He is medically stable for discharge            SUBJECTIVE    Patient seen and examined. Sitter at bedside. No acute events overnight. Patient slept the entire night. He is still resting comfortably in bed. No complaints. OBJECTIVE    VITALS: Temp: Temp: 97.1 °F (36.2 °C)Temp  Av.5 °F (36.9 °C)  Min: 97.1 °F (36.2 °C)  Max: 99.4 °F (54.6 °C) BP Systolic (86ZUB), ALYSA:148 , Min:115 , SHQ:002   Diastolic (98THC), RBS:07, Min:68, Max:85   Pulse Pulse  Av.8  Min: 69  Max: 75 Resp Resp  Av.3  Min: 1  Max: 20 Pulse ox SpO2  Av.3 %  Min: 94 %  Max: 98 %        General appearance - sleeping comfotably. HEENT: NCAT, oral mucosa moist  Chest - resp even and unlabored   Cardiovascular - RRR  Abdomen - soft, ND, NT  Neurological - moving all extremities.    Integumentary - warm and dry   Musculoskeletal - no edema or cyanosis. Some swelling to L knee      No intake/output data recorded. Drain/tube output:  In: 240 [P.O.:240]  Out: -     LAB:  CBC:   Recent Labs     08/14/19  0547 08/15/19  0606   WBC 11.5* 8.8   HGB 8.7* 8.7*   HCT 28.1* 28.4*   MCV 94.0 93.7    352     BMP: No results for input(s): NA, K, CL, CO2, BUN, CREATININE, GLUCOSE in the last 72 hours. COAGS: No results for input(s): APTT, PROT, INR in the last 72 hours. RADIOLOGY:    XR KNEE LEFT (1-2 VIEWS) [[id number]] Collected: 08/14/19 1525      Order Status: Completed Updated: 08/14/19 2100     Narrative:       EXAMINATION:  TWO XRAY VIEWS OF THE LEFT KNEE    8/14/2019 3:23 pm    COMPARISON:  None. HISTORY:  ORDERING SYSTEM PROVIDED HISTORY: pain s/p fall  TECHNOLOGIST PROVIDED HISTORY:  pain s/p fall  Reason for Exam: pain, s/p fall    FINDINGS:  Chondrocalcinosis.  No acute fracture.  No joint effusion.     Impression:       No acute findings.     XR WRIST RIGHT (2 VIEWS) [[id number]] Collected: 08/14/19 1526     Order Status: Completed Updated: 08/14/19 1578     Narrative:       EXAMINATION:  2 XRAY VIEWS OF THE RIGHT WRIST    8/14/2019 3:23 pm    COMPARISON:  Right hand radiographs August 8, 2019    HISTORY:  ORDERING SYSTEM PROVIDED HISTORY: pain s/p fall  TECHNOLOGIST PROVIDED HISTORY:  pain s/p fall  Reason for Exam: pain s/p fall    FINDINGS:  No acute fracture.  Mild wrist soft tissue swelling.  Mild triscaphe joint  space narrowing.     Impression:       Mild soft tissue swelling.  No acute fracture. Electronically signed by Rajinder Mueller DO on 8/15/2019 at 9:15 AM                Trauma Attending Attestation      I have reviewed the above GCS note(s) and confirmed the key elements of the medical history and physical exam. I have seen and examined the pt. I have discussed the findings, established the care plan and recommendations with Resident, GCS RN, bedside nurse.   dispo planning     Jonathan Hernandez DO  8/15/2019  7:39

## 2019-08-16 PROBLEM — R41.0 DELIRIUM: Status: ACTIVE | Noted: 2019-08-16

## 2019-08-16 PROBLEM — R50.9 FEVER: Status: ACTIVE | Noted: 2019-08-16

## 2019-08-19 NOTE — DISCHARGE SUMMARY
placement TECHNOLOGIST PROVIDED HISTORY: intra op, line placement FINDINGS: AP portable view of the chest time stamped at 1417 hours demonstrates overlying cardiac monitoring electrodes. An endotracheal tube terminates 4.4 cm above the ursula. Intestinal tube extends below the diaphragm. Right central line terminates in the superior vena cava. Right internal jugular line terminates in the superior vena cava. Three right chest tubes are noted, 1 terminating in the right apex medially, another in the right upper lung field medially and a third at the right lung base. Right pleural effusion is noted and there are scattered opacities in the right mid and lower lung fields likely related to pulmonary contusion. Heart size is normal. There has been interval placement of multiple plates and pins in the lower rib cage transfixing fractures. Right posterior 2nd rib fracture is noted. Subcutaneous emphysema is noted along the right lateral chest wall and right infraclavicular area. Small amount of extrapleural air right apex is suggested. Heart size is normal.  Stranding is present at the left base likely atelectasis. Skin clips right lower chest are noted. 1.  Interval placement of additional right-sided chest tubes, multiple support tubes and lines. 2.  Opacities in the right mid and lower lung fields likely related to pulmonary contusion. 3.  Interval operative reduction and fixation of right-sided rib fractures lower hemithorax. 4.  Small amount of extrapleural air right apex. Subcutaneous emphysema right side. Xr Pelvis (1-2 Views)    Result Date: 7/25/2019  EXAMINATION: ONE XRAY VIEW OF THE PELVIS 7/25/2019 9:09 am COMPARISON: July 15, 2019 HISTORY: ORDERING SYSTEM PROVIDED HISTORY: trauma TECHNOLOGIST PROVIDED HISTORY: trauma Initial workup for trauma. FINDINGS: Linear lucency in the right iliac corresponds to iliac fracture previously seen. Femoral heads align normally with the acetabula. HEAD WITHOUT CONTRAST  8/12/2019 12:26 pm TECHNIQUE: CT of the head was performed without the administration of intravenous contrast. Dose modulation, iterative reconstruction, and/or weight based adjustment of the mA/kV was utilized to reduce the radiation dose to as low as reasonably achievable. COMPARISON: 08/01/2019. HISTORY: ORDERING SYSTEM PROVIDED HISTORY: fall while in room TECHNOLOGIST PROVIDED HISTORY: FINDINGS: BRAIN/VENTRICLES: There is no acute intracranial hemorrhage, mass effect or midline shift. No abnormal extra-axial fluid collection. The gray-white differentiation is maintained without evidence of an acute infarct. There is no evidence of hydrocephalus. ORBITS: The visualized portion of the orbits demonstrate no acute abnormality. SINUSES: The visualized paranasal sinuses and mastoid air cells demonstrate no acute abnormality. SOFT TISSUES/SKULL:  No acute abnormality of the visualized skull or soft tissues. 1.  No acute intracranial abnormality. Ct Head Wo Contrast    Result Date: 8/1/2019  EXAMINATION: CT OF THE HEAD WITHOUT CONTRAST  8/1/2019 11:35 am TECHNIQUE: CT of the head was performed without the administration of intravenous contrast. Dose modulation, iterative reconstruction, and/or weight based adjustment of the mA/kV was utilized to reduce the radiation dose to as low as reasonably achievable. COMPARISON: CT brain performed 07/15/2019. HISTORY: ORDERING SYSTEM PROVIDED HISTORY: mental status change TECHNOLOGIST PROVIDED HISTORY: FINDINGS: BRAIN/VENTRICLES: There is no acute intracranial hemorrhage, mass effect, or midline shift. There is satisfactory overall gray-white matter differentiation. Mild cerebral atrophy. The ventricular structures are symmetric and unremarkable. The infratentorial structures are unremarkable. ORBITS: The visualized portion of the orbits demonstrate no acute abnormality. SINUSES: There is fluid in the left mastoid air cells.   The right mastoid administration of intravenous contrast. Multiplanar reformatted images are provided for review. Dose modulation, iterative reconstruction, and/or weight based adjustment of the mA/kV was utilized to reduce the radiation dose to as low as reasonably achievable. COMPARISON: 07/15/2019 CT cervical spine and thoracic spine HISTORY: ORDERING SYSTEM PROVIDED HISTORY: fall in room FINDINGS: BONES/ALIGNMENT: There is no evidence of an acute cervical spine fracture. There is normal alignment of the cervical spine. Incidental note of nonacute right 1st rib and right T2 and T3 transverse process fractures near the tip. These also demonstrated on the prior. DEGENERATIVE CHANGES: No significant degenerative changes. SOFT TISSUES: There is no prevertebral soft tissue swelling. No acute abnormality of the cervical spine. Redemonstration of right 1st rib as well as right T2 and T3 transverse process fractures. Xr Chest Portable    Result Date: 8/8/2019  EXAMINATION: ONE XRAY VIEW OF THE CHEST 8/8/2019 11:07 am COMPARISON: August 6, 2019 HISTORY: ORDERING SYSTEM PROVIDED HISTORY: compare atelectasis from 8/6. TECHNOLOGIST PROVIDED HISTORY: compare atelectasis from 8/6. Reason for Exam: compare atelectasis Acuity: Unknown Type of Exam: Unknown FINDINGS: Enteric tube extends beyond the gastroesophageal junction. Left mid lung calcified granuloma. Left lung is clear. Postsurgical changes are again seen within the right lung with partial clearing at the right lung base. Small residual pleural effusion. Partial clearing at the right lung base. Xr Chest Portable    Result Date: 8/4/2019  EXAMINATION: ONE XRAY VIEW OF THE CHEST 8/4/2019 6:24 am COMPARISON: August 3, 2019 HISTORY: ORDERING SYSTEM PROVIDED HISTORY: sob TECHNOLOGIST PROVIDED HISTORY: sob Reason for Exam: sob Acuity: Unknown Type of Exam: Unknown FINDINGS: Persistent right lung opacities. Right lung opacities are unchanged.      Xr Chest from the ursula. Right central venous catheter with tip projecting in the region of the mid SVC. No significant change in right-sided chest tubes. Postsurgical changes from internal fixation of right rib fractures. Subcutaneous emphysema along the right chest again noted. Left lung is clear. Slight improvement aeration of the right lung. No significant change otherwise. Slight improvement in aeration of the right lung. No significant change otherwise. Xr Chest Portable    Result Date: 7/24/2019  EXAMINATION: ONE XRAY VIEW OF THE CHEST 7/24/2019 9:23 pm COMPARISON: 5 hours ago HISTORY: ORDERING SYSTEM PROVIDED HISTORY: advancement of ETT TECHNOLOGIST PROVIDED HISTORY: advancement of ETT FINDINGS: ET and enteric tubes unchanged. Right subclavian central line unchanged. 3 chest tubes on the right unchanged. Plates and screws transfix 4 ribs. Volume loss on the right and pleuroparenchymal reaction. No pneumothorax. Left lung clear. Bony thorax otherwise intact. Skin staples on the right. Subcutaneous gas on the right. Moderate interstitial infiltrates and pleuroparenchymal reaction on the right unchanged. No pneumothorax. Life support apparatus unchanged. Hardware right ribs. Xr Chest Portable    Result Date: 7/24/2019  EXAMINATION: ONE XRAY VIEW OF THE CHEST 7/24/2019 3:58 pm COMPARISON: AP chest from earlier the same day at 14:17 HISTORY: ORDERING SYSTEM PROVIDED HISTORY: S/p central line, OR TECHNOLOGIST PROVIDED HISTORY: S/p central line, OR FINDINGS: Again noted is a right IJ catheter with its tip in the SVC, unchanged. ETT tip stable at the sternoclavicular joint level. Enteric tube tip and side hole project below the left hemidiaphragm. Multiple right-sided chest tubes, rib fixation hardware, and overlying staples. Unchanged mild subcutaneous air. Cardiomediastinal shadow stable. Better aeration left lung with a few scattered calcified granulomas.   Focal densities right lung medications  · ibuprofen 100 MG/5ML suspension       Diet: No diet orders on file diet as tolerated  Activity: as dictated by rehab center  Wound Care: Daily and as needed  Follow-up:  in the next few weeks with Kellie Gonzalez MD,  Follow up in [address] as needed  Time Spent for discharge: 30 minutes    Koby Mccarthy DO  PGY 1  Resident Physician Emergency Medicine  Trauma and General Surgery Service  08/19/19 2:05 PM

## 2019-10-27 PROBLEM — R41.0 DELIRIUM: Status: RESOLVED | Noted: 2019-08-16 | Resolved: 2019-10-27

## 2019-10-27 PROBLEM — J94.2 HEMOTHORAX ON RIGHT: Status: RESOLVED | Noted: 2019-07-15 | Resolved: 2019-10-27

## 2019-12-20 PROBLEM — J93.9 PNEUMOTHORAX: Status: RESOLVED | Noted: 2019-07-15 | Resolved: 2019-12-20

## 2019-12-20 PROBLEM — S27.321A CONTUSION OF RIGHT LUNG: Status: RESOLVED | Noted: 2019-07-15 | Resolved: 2019-12-20

## 2020-12-28 NOTE — PROGRESS NOTES
Please forward all medication refills and test supply refills to the medication refill pool to be refilled per protocol.   PIV    Labs/Imaging to f/u:  None    Dispo:  Transfer to floor     Subjective  Seen and examed. Mentation improving somewhat. Denies any f/c, n/c, sob     OBJECTIVE  VITALS: Temp: Temp: 98.8 °F (37.1 °C)Temp  Av.3 °F (36.8 °C)  Min: 97.7 °F (36.5 °C)  Max: 99 °F (80.9 °C) BP Systolic (90BNG), [place]:100 , Min:122 , MDV:628   Diastolic (45DWE), SPP:27, Min:69, Max:118   Pulse Pulse  Av  Min: 102  Max: 120 Resp Resp  Av.1  Min: 17  Max: 26 Pulse ox SpO2  Av.3 %  Min: 97 %  Max: 100 %    CONSTITUTIONAL: alert, tracks, answer questions, just disoriented and difficult speech    HEENT: no conjunctival pallor, EOM intact, Trachea midline, NGT without OGT  LUNGS: equal chest rise and fall, no audible Wheezes or rhonchi  CV: RRR  GI: soft, nt. Nd, no rebound or guarding  MUSCULOSKELETAL: moving all extremities  NEUROLOGIC: GCS 15, Alert  SKIN: lines C/D/I     LAB:  CBC:   Recent Labs     19  0541 19  0444 19  0425   WBC 15.5* 15.1* 16.4*   HGB 7.5* 8.1* 8.6*   HCT 24.5* 26.6* 28.3*   MCV 97.2 96.4 96.3   * 742* 686*     BMP:   Recent Labs     19  0541 19  0444 19  0425    144 143   K 3.9 4.2 4.2    106 105   CO2 23 23 22   BUN 32* 29* 30*   CREATININE 1.04 1.00 0.95   GLUCOSE 111* 106* 123*     CXR: persistent R basilar effusion and opacity    Adeelli Simsamson DO  19, 11:45 AM       I personally evaluated the patient and directed the medical decision making with Resident/KAVITHA after the physical/radiologic exam and laboratory values were reviewed and confirmed.  ANM

## 2025-03-12 NOTE — PLAN OF CARE
Problem: Pain:  Description  Pain management should include both nonpharmacologic and pharmacologic interventions.   Goal: Pain level will decrease  Description  Pain level will decrease   Outcome: Ongoing  Goal: Control of acute pain  Description  Control of acute pain   Outcome: Ongoing  Goal: Control of chronic pain  Description  Control of chronic pain   Outcome: Ongoing     Problem: Falls - Risk of:  Goal: Will remain free from falls  Description  Will remain free from falls   Outcome: Ongoing  Goal: Absence of physical injury  Description  Absence of physical injury   Outcome: Ongoing     Problem: Confusion - Acute:  Goal: Absence of continued neurological deterioration signs and symptoms  Description  Absence of continued neurological deterioration signs and symptoms  Outcome: Ongoing  Goal: Mental status will be restored to baseline  Description  Mental status will be restored to baseline  Outcome: Ongoing     Problem: Discharge Planning:  Goal: Ability to perform activities of daily living will improve  Description  Ability to perform activities of daily living will improve  Outcome: Ongoing  Goal: Participates in care planning  Description  Participates in care planning  Outcome: Ongoing     Problem: Injury - Risk of, Physical Injury:  Goal: Will remain free from falls  Description  Will remain free from falls   Outcome: Ongoing  Goal: Absence of physical injury  Description  Absence of physical injury   Outcome: Ongoing     Problem: Mood - Altered:  Goal: Mood stable  Description  Mood stable  Outcome: Ongoing  Goal: Absence of abusive behavior  Description  Absence of abusive behavior  Outcome: Ongoing  Goal: Verbalizations of feeling emotionally comfortable while being cared for will increase  Description  Verbalizations of feeling emotionally comfortable while being cared for will increase  Outcome: Ongoing     Problem: Psychomotor Activity - Altered:  Goal: Absence of psychomotor disturbance signs [de-identified] : 12/9/24 Findings: Examination with radial echoendoscope.  The distal esophagus appeared normal endosonographically.  The celiac axis appeared normal, no lymphadenopathy noted. The gallbladder appears normal with no stones or sludge visible.  The duodenal wall appears thickened to approximately 4 mm.  The pancreatic neck, body and tail appeared to be of heterogeneous echogenicity with no masses, cysts or calcifications seen.  The pancreas was more hypoechoic with calcifications in the head of the pancreas and at the ampulla.  The pancreatic duct was not dilated measuring 2 mm in the body, neck and tail.  Difficult to visualize the bile duct and the pancreatic duct in the head secondary to the duodenal wall edema.  No duodenal or pancreatic masses noted. [de-identified] : 2022 -- CBD stone with stricture s/p stent placed and removed -- Dr. Christianson. [de-identified] : 12/31/24 CT Impression: -Calcified 2.4 x 1.2 cm structure in the pancreatic head likely reflect sequela of chronic pancreatitis. Underlying mass is not excluded. -Prominent papilla. ERCP may be of benefit. -Post left nephrectomy. No definite residual mass is seen -Indeterminate adrenal nodules.  [[email or web address]] : MRI: 2/13/2024 Gallbladder normal, no cholelithiasis or cholecystitis, no biliary dilation, no choledocholithiasis or stricture; unremarkable pancreas.

## (undated) DEVICE — BAG ENDOSCP TRNSPRT CLR RECLOSABLE 24INX20IN

## (undated) DEVICE — GOWN,AURORA,NONREINFORCED,LARGE: Brand: MEDLINE

## (undated) DEVICE — POSITIONER,HEAD,MULTIRING,36CS: Brand: MEDLINE

## (undated) DEVICE — GLOVE ORANGE PI 7 1/2   MSG9075

## (undated) DEVICE — SYRINGE IRRIG 60ML SFT PLIABLE BLB EZ TO GRP 1 HND USE W/

## (undated) DEVICE — SEALER TISS L20CM DIA13MM ADV BPLR L CRV JAW OPN APPRCH

## (undated) DEVICE — TAPE UMB 72X7/32 IN

## (undated) DEVICE — COVER LT HNDL BLU PLAS

## (undated) DEVICE — PROTECTOR ULN NRV PUR FOAM HK LOOP STRP ANATOMICALLY

## (undated) DEVICE — PACK SURG ABD SVMMC

## (undated) DEVICE — TUBING, SUCTION, 9/32" X 20', STRAIGHT: Brand: MEDLINE INDUSTRIES, INC.

## (undated) DEVICE — TOWEL,OR,DSP,ST,NATURAL,DLX,4/PK,20PK/CS: Brand: MEDLINE

## (undated) DEVICE — YANKAUER,FLEXIBLE HANDLE,REGLR CAPACITY: Brand: MEDLINE INDUSTRIES, INC.

## (undated) DEVICE — CATHETER THOR 32FR L23IN PVC 6 EYELET STR ATRAUM

## (undated) DEVICE — PACK PROCEDURE SURG SVMMC THORACOTOMY

## (undated) DEVICE — UNIT DRNAGE PLEUR CAV SGL COLL SUCT CTRL REGULATED STR CONN

## (undated) DEVICE — DRESSING,GAUZE,XEROFORM,CURAD,1"X8",ST: Brand: CURAD

## (undated) DEVICE — GLOVE ORANGE PI 8   MSG9080

## (undated) DEVICE — Device

## (undated) DEVICE — SUTURE PERMAHAND SZ 3-0 L18IN NONABSORBABLE BLK L26MM SH C013D

## (undated) DEVICE — GOWN,AURORA,NONRNF,XL,30/CS: Brand: MEDLINE

## (undated) DEVICE — GLOVE SURG SZ 6 THK91MIL LTX FREE SYN POLYISOPRENE ANTI

## (undated) DEVICE — GLOVE ORANGE PI 7   MSG9070

## (undated) DEVICE — CHLORAPREP 26ML ORANGE

## (undated) DEVICE — UNIT DRNGE SGL COLL W/ INLINE CONN EXPR

## (undated) DEVICE — SUTURE ABSORBABLE BRAIDED 2-0 CT-1 27 IN UD VICRYL J259H

## (undated) DEVICE — GAUZE,SPONGE,FLUFF,6"X6.75",STRL,5/TRAY: Brand: MEDLINE

## (undated) DEVICE — GLOVE SURG SZ 65 THK91MIL LTX FREE SYN POLYISOPRENE

## (undated) DEVICE — SUTURE PDS II SZ 0 L60IN ABSRB VLT L65MM TP-1 1/2 CIR Z991G

## (undated) DEVICE — SUTURE VCRL SZ 0 L27IN ABSRB UD L36MM CT-1 1/2 CIR J260H

## (undated) DEVICE — SPONGE LAP W18XL18IN WHT COT 4 PLY FLD STRUNG RADPQ DISP ST

## (undated) DEVICE — MITT PREP W575XL775IN POVIDONE IOD HAIR REMV

## (undated) DEVICE — YANKAUER,POOLE TIP,STERILE,50/CS: Brand: MEDLINE